# Patient Record
Sex: FEMALE | Race: OTHER | Employment: OTHER | URBAN - METROPOLITAN AREA
[De-identification: names, ages, dates, MRNs, and addresses within clinical notes are randomized per-mention and may not be internally consistent; named-entity substitution may affect disease eponyms.]

---

## 2017-01-12 ENCOUNTER — HOSPITAL ENCOUNTER (EMERGENCY)
Age: 52
Discharge: HOME | End: 2017-01-12
Payer: COMMERCIAL

## 2017-01-12 VITALS
HEIGHT: 63 IN | BODY MASS INDEX: 35.79 KG/M2 | TEMPERATURE: 98.1 F | WEIGHT: 202 LBS | SYSTOLIC BLOOD PRESSURE: 156 MMHG | HEART RATE: 96 BPM | RESPIRATION RATE: 17 BRPM | OXYGEN SATURATION: 99 % | DIASTOLIC BLOOD PRESSURE: 94 MMHG

## 2017-01-12 DIAGNOSIS — N39.0 URINARY TRACT INFECTION WITH HEMATURIA, SITE UNSPECIFIED: Primary | ICD-10-CM

## 2017-01-12 DIAGNOSIS — R31.9 URINARY TRACT INFECTION WITH HEMATURIA, SITE UNSPECIFIED: Primary | ICD-10-CM

## 2017-01-12 LAB
BACTERIA: ABNORMAL /HPF
BILIRUBIN URINE: NEGATIVE
BLOOD, URINE: ABNORMAL
CLARITY: ABNORMAL
COLOR: YELLOW
EPITHELIAL CELLS, UA: ABNORMAL /HPF
GLUCOSE URINE: 500 MG/DL
KETONES, URINE: NEGATIVE MG/DL
LEUKOCYTE ESTERASE, URINE: ABNORMAL
NITRITE, URINE: NEGATIVE
PH UA: 6 (ref 5–9)
PROTEIN UA: NEGATIVE MG/DL
RBC UA: ABNORMAL /HPF (ref 0–2)
SPECIFIC GRAVITY UA: 1.01 (ref 1–1.03)
UROBILINOGEN, URINE: 0.2 E.U./DL
WBC UA: >100 /HPF (ref 0–5)

## 2017-01-12 PROCEDURE — 81001 URINALYSIS AUTO W/SCOPE: CPT

## 2017-01-12 PROCEDURE — 6370000000 HC RX 637 (ALT 250 FOR IP): Performed by: PERSONAL EMERGENCY RESPONSE ATTENDANT

## 2017-01-12 PROCEDURE — 99283 EMERGENCY DEPT VISIT LOW MDM: CPT

## 2017-01-12 RX ORDER — SULFAMETHOXAZOLE AND TRIMETHOPRIM 800; 160 MG/1; MG/1
1 TABLET ORAL 2 TIMES DAILY
Qty: 6 TABLET | Refills: 0 | Status: SHIPPED | OUTPATIENT
Start: 2017-01-12 | End: 2017-01-15

## 2017-01-12 RX ORDER — SULFAMETHOXAZOLE AND TRIMETHOPRIM 800; 160 MG/1; MG/1
1 TABLET ORAL ONCE
Status: COMPLETED | OUTPATIENT
Start: 2017-01-12 | End: 2017-01-12

## 2017-01-12 RX ORDER — ACETAMINOPHEN 500 MG
500 TABLET ORAL EVERY 6 HOURS PRN
Qty: 20 TABLET | Refills: 0 | Status: SHIPPED | OUTPATIENT
Start: 2017-01-12 | End: 2017-04-14

## 2017-01-12 RX ORDER — PHENAZOPYRIDINE HYDROCHLORIDE 200 MG/1
200 TABLET, FILM COATED ORAL 3 TIMES DAILY PRN
Qty: 6 TABLET | Refills: 0 | Status: SHIPPED | OUTPATIENT
Start: 2017-01-12 | End: 2017-01-15

## 2017-01-12 RX ORDER — ACETAMINOPHEN 500 MG
1000 TABLET ORAL ONCE
Status: COMPLETED | OUTPATIENT
Start: 2017-01-12 | End: 2017-01-12

## 2017-01-12 RX ORDER — PHENAZOPYRIDINE HYDROCHLORIDE 200 MG/1
200 TABLET, FILM COATED ORAL ONCE
Status: COMPLETED | OUTPATIENT
Start: 2017-01-12 | End: 2017-01-12

## 2017-01-12 RX ADMIN — SULFAMETHOXAZOLE AND TRIMETHOPRIM 1 TABLET: 800; 160 TABLET ORAL at 20:15

## 2017-01-12 RX ADMIN — PHENAZOPYRIDINE 200 MG: 200 TABLET ORAL at 20:15

## 2017-01-12 RX ADMIN — ACETAMINOPHEN 1000 MG: 500 TABLET ORAL at 20:15

## 2017-01-12 ASSESSMENT — ENCOUNTER SYMPTOMS
COLOR CHANGE: 0
NAUSEA: 0
SORE THROAT: 0
RHINORRHEA: 0
BLOOD IN STOOL: 0
ABDOMINAL PAIN: 1
DIARRHEA: 0
SHORTNESS OF BREATH: 0
VOMITING: 0
COUGH: 0

## 2017-01-12 ASSESSMENT — PAIN DESCRIPTION - FREQUENCY: FREQUENCY: CONTINUOUS

## 2017-01-12 ASSESSMENT — PAIN SCALES - GENERAL
PAINLEVEL_OUTOF10: 10
PAINLEVEL_OUTOF10: 10

## 2017-01-12 ASSESSMENT — PAIN DESCRIPTION - LOCATION: LOCATION: ABDOMEN

## 2017-01-12 ASSESSMENT — PAIN DESCRIPTION - DESCRIPTORS: DESCRIPTORS: PRESSURE

## 2017-01-12 ASSESSMENT — PAIN DESCRIPTION - ORIENTATION: ORIENTATION: LOWER

## 2017-04-14 ENCOUNTER — OFFICE VISIT (OUTPATIENT)
Dept: OBGYN | Age: 52
End: 2017-04-14

## 2017-04-14 VITALS
WEIGHT: 203 LBS | DIASTOLIC BLOOD PRESSURE: 84 MMHG | SYSTOLIC BLOOD PRESSURE: 120 MMHG | BODY MASS INDEX: 35.96 KG/M2 | HEART RATE: 84 BPM

## 2017-04-14 DIAGNOSIS — E89.41 HOT FLASHES DUE TO SURGICAL MENOPAUSE: Primary | ICD-10-CM

## 2017-04-14 DIAGNOSIS — Z76.0 ENCOUNTER FOR MEDICATION REFILL: ICD-10-CM

## 2017-04-14 PROCEDURE — 99213 OFFICE O/P EST LOW 20 MIN: CPT | Performed by: OBSTETRICS & GYNECOLOGY

## 2017-04-14 RX ORDER — ESTRADIOL 0.05 MG/D
1 FILM, EXTENDED RELEASE TRANSDERMAL
Qty: 8 PATCH | Refills: 11 | Status: SHIPPED | OUTPATIENT
Start: 2017-04-17 | End: 2018-03-13 | Stop reason: SDUPTHER

## 2017-04-14 ASSESSMENT — ENCOUNTER SYMPTOMS
SHORTNESS OF BREATH: 0
COUGH: 0
VOMITING: 0
NAUSEA: 0

## 2017-07-29 ENCOUNTER — APPOINTMENT (OUTPATIENT)
Dept: GENERAL RADIOLOGY | Age: 52
End: 2017-07-29
Payer: COMMERCIAL

## 2017-07-29 ENCOUNTER — HOSPITAL ENCOUNTER (OUTPATIENT)
Age: 52
Setting detail: OBSERVATION
Discharge: HOME | End: 2017-07-31
Attending: INTERNAL MEDICINE | Admitting: INTERNAL MEDICINE
Payer: COMMERCIAL

## 2017-07-29 DIAGNOSIS — R07.2 PRECORDIAL PAIN: Primary | ICD-10-CM

## 2017-07-29 LAB
ALBUMIN SERPL-MCNC: 3.9 G/DL (ref 3.9–4.9)
ALP BLD-CCNC: 119 U/L (ref 40–130)
ALT SERPL-CCNC: 30 U/L (ref 0–33)
AMPHETAMINE SCREEN, URINE: NORMAL
ANION GAP SERPL CALCULATED.3IONS-SCNC: 12 MEQ/L (ref 7–13)
AST SERPL-CCNC: 26 U/L (ref 0–35)
BARBITURATE SCREEN URINE: NORMAL
BASOPHILS ABSOLUTE: 0.1 K/UL (ref 0–0.2)
BASOPHILS RELATIVE PERCENT: 1.1 %
BENZODIAZEPINE SCREEN, URINE: NORMAL
BILIRUB SERPL-MCNC: 0.5 MG/DL (ref 0–1.2)
BILIRUBIN URINE: NEGATIVE
BLOOD, URINE: NEGATIVE
BUN BLDV-MCNC: 10 MG/DL (ref 6–20)
CALCIUM SERPL-MCNC: 8.7 MG/DL (ref 8.6–10.2)
CANNABINOID SCREEN URINE: NORMAL
CHLORIDE BLD-SCNC: 98 MEQ/L (ref 98–107)
CLARITY: ABNORMAL
CO2: 28 MEQ/L (ref 22–29)
COCAINE METABOLITE SCREEN URINE: NORMAL
COLOR: YELLOW
CREAT SERPL-MCNC: 0.66 MG/DL (ref 0.5–0.9)
EOSINOPHILS ABSOLUTE: 0.2 K/UL (ref 0–0.7)
EOSINOPHILS RELATIVE PERCENT: 2.1 %
GFR AFRICAN AMERICAN: >60
GFR NON-AFRICAN AMERICAN: >60
GLOBULIN: 3 G/DL (ref 2.3–3.5)
GLUCOSE BLD-MCNC: 178 MG/DL (ref 60–115)
GLUCOSE BLD-MCNC: 251 MG/DL (ref 74–109)
GLUCOSE URINE: 500 MG/DL
HCT VFR BLD CALC: 41.7 % (ref 37–47)
HEMOGLOBIN: 13.7 G/DL (ref 12–16)
KETONES, URINE: ABNORMAL MG/DL
LEUKOCYTE ESTERASE, URINE: NEGATIVE
LYMPHOCYTES ABSOLUTE: 1.8 K/UL (ref 1–4.8)
LYMPHOCYTES RELATIVE PERCENT: 23.5 %
Lab: NORMAL
MAGNESIUM: 1.7 MG/DL (ref 1.7–2.3)
MCH RBC QN AUTO: 27.3 PG (ref 27–31.3)
MCHC RBC AUTO-ENTMCNC: 32.8 % (ref 33–37)
MCV RBC AUTO: 83.5 FL (ref 82–100)
MONOCYTES ABSOLUTE: 0.5 K/UL (ref 0.2–0.8)
MONOCYTES RELATIVE PERCENT: 6.1 %
NEUTROPHILS ABSOLUTE: 5.1 K/UL (ref 1.4–6.5)
NEUTROPHILS RELATIVE PERCENT: 67.2 %
NITRITE, URINE: NEGATIVE
OPIATE SCREEN URINE: NORMAL
PDW BLD-RTO: 15.9 % (ref 11.5–14.5)
PERFORMED ON: ABNORMAL
PH UA: 6 (ref 5–9)
PHENCYCLIDINE SCREEN URINE: NORMAL
PLATELET # BLD: 247 K/UL (ref 130–400)
POTASSIUM SERPL-SCNC: 4.1 MEQ/L (ref 3.5–5.1)
PRO-BNP: 40 PG/ML
PROTEIN UA: NEGATIVE MG/DL
RBC # BLD: 5 M/UL (ref 4.2–5.4)
SODIUM BLD-SCNC: 138 MEQ/L (ref 132–144)
SPECIFIC GRAVITY UA: 1.02 (ref 1–1.03)
TOTAL CK: 59 U/L (ref 0–170)
TOTAL PROTEIN: 6.9 G/DL (ref 6.4–8.1)
TROPONIN: <0.01 NG/ML (ref 0–0.01)
URINE REFLEX TO CULTURE: ABNORMAL
UROBILINOGEN, URINE: 1 E.U./DL
WBC # BLD: 7.6 K/UL (ref 4.8–10.8)

## 2017-07-29 PROCEDURE — 36415 COLL VENOUS BLD VENIPUNCTURE: CPT

## 2017-07-29 PROCEDURE — 96372 THER/PROPH/DIAG INJ SC/IM: CPT

## 2017-07-29 PROCEDURE — 83735 ASSAY OF MAGNESIUM: CPT

## 2017-07-29 PROCEDURE — 99285 EMERGENCY DEPT VISIT HI MDM: CPT

## 2017-07-29 PROCEDURE — 2580000003 HC RX 258: Performed by: PHYSICIAN ASSISTANT

## 2017-07-29 PROCEDURE — 6360000002 HC RX W HCPCS: Performed by: PHYSICIAN ASSISTANT

## 2017-07-29 PROCEDURE — 96375 TX/PRO/DX INJ NEW DRUG ADDON: CPT

## 2017-07-29 PROCEDURE — 6370000000 HC RX 637 (ALT 250 FOR IP): Performed by: INTERNAL MEDICINE

## 2017-07-29 PROCEDURE — G0378 HOSPITAL OBSERVATION PER HR: HCPCS

## 2017-07-29 PROCEDURE — 6370000000 HC RX 637 (ALT 250 FOR IP): Performed by: PHYSICIAN ASSISTANT

## 2017-07-29 PROCEDURE — 82550 ASSAY OF CK (CPK): CPT

## 2017-07-29 PROCEDURE — 81003 URINALYSIS AUTO W/O SCOPE: CPT

## 2017-07-29 PROCEDURE — 85025 COMPLETE CBC W/AUTO DIFF WBC: CPT

## 2017-07-29 PROCEDURE — 84484 ASSAY OF TROPONIN QUANT: CPT

## 2017-07-29 PROCEDURE — 96374 THER/PROPH/DIAG INJ IV PUSH: CPT

## 2017-07-29 PROCEDURE — 71020 XR CHEST STANDARD TWO VW: CPT

## 2017-07-29 PROCEDURE — 83880 ASSAY OF NATRIURETIC PEPTIDE: CPT

## 2017-07-29 PROCEDURE — 80307 DRUG TEST PRSMV CHEM ANLYZR: CPT

## 2017-07-29 PROCEDURE — 80053 COMPREHEN METABOLIC PANEL: CPT

## 2017-07-29 PROCEDURE — 93005 ELECTROCARDIOGRAM TRACING: CPT

## 2017-07-29 RX ORDER — MORPHINE SULFATE 4 MG/ML
4 INJECTION, SOLUTION INTRAMUSCULAR; INTRAVENOUS ONCE
Status: COMPLETED | OUTPATIENT
Start: 2017-07-29 | End: 2017-07-29

## 2017-07-29 RX ORDER — ACETAMINOPHEN 325 MG/1
650 TABLET ORAL EVERY 4 HOURS PRN
Status: DISCONTINUED | OUTPATIENT
Start: 2017-07-29 | End: 2017-07-31 | Stop reason: HOSPADM

## 2017-07-29 RX ORDER — MORPHINE SULFATE 2 MG/ML
2 INJECTION, SOLUTION INTRAMUSCULAR; INTRAVENOUS
Status: DISCONTINUED | OUTPATIENT
Start: 2017-07-29 | End: 2017-07-31 | Stop reason: HOSPADM

## 2017-07-29 RX ORDER — METFORMIN HYDROCHLORIDE 500 MG/1
1000 TABLET, EXTENDED RELEASE ORAL 2 TIMES DAILY WITH MEALS
Status: DISCONTINUED | OUTPATIENT
Start: 2017-07-30 | End: 2017-07-31 | Stop reason: HOSPADM

## 2017-07-29 RX ORDER — CETIRIZINE HYDROCHLORIDE 10 MG/1
10 TABLET ORAL DAILY
Status: DISCONTINUED | OUTPATIENT
Start: 2017-07-30 | End: 2017-07-31 | Stop reason: HOSPADM

## 2017-07-29 RX ORDER — NITROGLYCERIN 0.4 MG/1
0.4 TABLET SUBLINGUAL EVERY 5 MIN PRN
Status: DISCONTINUED | OUTPATIENT
Start: 2017-07-29 | End: 2017-07-31 | Stop reason: HOSPADM

## 2017-07-29 RX ORDER — LEVOTHYROXINE SODIUM 0.15 MG/1
150 TABLET ORAL
Status: DISCONTINUED | OUTPATIENT
Start: 2017-07-30 | End: 2017-07-31 | Stop reason: HOSPADM

## 2017-07-29 RX ORDER — GLIPIZIDE 5 MG/1
5 TABLET ORAL DAILY
Status: DISCONTINUED | OUTPATIENT
Start: 2017-07-30 | End: 2017-07-31 | Stop reason: HOSPADM

## 2017-07-29 RX ORDER — DIVALPROEX SODIUM 500 MG/1
500 TABLET, EXTENDED RELEASE ORAL DAILY
Status: DISCONTINUED | OUTPATIENT
Start: 2017-07-30 | End: 2017-07-31 | Stop reason: HOSPADM

## 2017-07-29 RX ORDER — DULOXETIN HYDROCHLORIDE 30 MG/1
30 CAPSULE, DELAYED RELEASE ORAL DAILY
COMMUNITY
End: 2018-12-18

## 2017-07-29 RX ORDER — MORPHINE SULFATE 4 MG/ML
4 INJECTION, SOLUTION INTRAMUSCULAR; INTRAVENOUS
Status: DISCONTINUED | OUTPATIENT
Start: 2017-07-29 | End: 2017-07-31 | Stop reason: HOSPADM

## 2017-07-29 RX ORDER — 0.9 % SODIUM CHLORIDE 0.9 %
1000 INTRAVENOUS SOLUTION INTRAVENOUS ONCE
Status: COMPLETED | OUTPATIENT
Start: 2017-07-29 | End: 2017-07-29

## 2017-07-29 RX ORDER — PANTOPRAZOLE SODIUM 40 MG/1
40 TABLET, DELAYED RELEASE ORAL
Status: DISCONTINUED | OUTPATIENT
Start: 2017-07-30 | End: 2017-07-31 | Stop reason: HOSPADM

## 2017-07-29 RX ORDER — DULOXETIN HYDROCHLORIDE 30 MG/1
30 CAPSULE, DELAYED RELEASE ORAL DAILY
Status: DISCONTINUED | OUTPATIENT
Start: 2017-07-30 | End: 2017-07-31 | Stop reason: HOSPADM

## 2017-07-29 RX ORDER — ASPIRIN 81 MG/1
81 TABLET, CHEWABLE ORAL DAILY
Status: CANCELLED | OUTPATIENT
Start: 2017-07-30

## 2017-07-29 RX ORDER — SODIUM CHLORIDE 0.9 % (FLUSH) 0.9 %
10 SYRINGE (ML) INJECTION PRN
Status: DISCONTINUED | OUTPATIENT
Start: 2017-07-29 | End: 2017-07-31 | Stop reason: HOSPADM

## 2017-07-29 RX ORDER — ATORVASTATIN CALCIUM 20 MG/1
20 TABLET, FILM COATED ORAL NIGHTLY
Status: DISCONTINUED | OUTPATIENT
Start: 2017-07-29 | End: 2017-07-31 | Stop reason: HOSPADM

## 2017-07-29 RX ORDER — ASPIRIN 325 MG
325 TABLET ORAL DAILY
Status: DISCONTINUED | OUTPATIENT
Start: 2017-07-30 | End: 2017-07-31 | Stop reason: HOSPADM

## 2017-07-29 RX ORDER — SODIUM CHLORIDE 0.9 % (FLUSH) 0.9 %
10 SYRINGE (ML) INJECTION EVERY 12 HOURS SCHEDULED
Status: DISCONTINUED | OUTPATIENT
Start: 2017-07-29 | End: 2017-07-31 | Stop reason: HOSPADM

## 2017-07-29 RX ORDER — ONDANSETRON 2 MG/ML
4 INJECTION INTRAMUSCULAR; INTRAVENOUS ONCE
Status: COMPLETED | OUTPATIENT
Start: 2017-07-29 | End: 2017-07-29

## 2017-07-29 RX ORDER — ONDANSETRON 2 MG/ML
4 INJECTION INTRAMUSCULAR; INTRAVENOUS EVERY 6 HOURS PRN
Status: DISCONTINUED | OUTPATIENT
Start: 2017-07-29 | End: 2017-07-31 | Stop reason: HOSPADM

## 2017-07-29 RX ORDER — QUETIAPINE FUMARATE 50 MG/1
50 TABLET, FILM COATED ORAL NIGHTLY
Status: DISCONTINUED | OUTPATIENT
Start: 2017-07-29 | End: 2017-07-31 | Stop reason: HOSPADM

## 2017-07-29 RX ORDER — METOPROLOL TARTRATE 50 MG/1
50 TABLET, FILM COATED ORAL ONCE
Status: COMPLETED | OUTPATIENT
Start: 2017-07-29 | End: 2017-07-29

## 2017-07-29 RX ORDER — ATORVASTATIN CALCIUM 20 MG/1
20 TABLET, FILM COATED ORAL NIGHTLY
Status: DISCONTINUED | OUTPATIENT
Start: 2017-07-29 | End: 2017-07-29 | Stop reason: SDUPTHER

## 2017-07-29 RX ORDER — ATORVASTATIN CALCIUM 20 MG/1
20 TABLET, FILM COATED ORAL NIGHTLY
COMMUNITY

## 2017-07-29 RX ORDER — LISINOPRIL 10 MG/1
10 TABLET ORAL DAILY
Status: DISCONTINUED | OUTPATIENT
Start: 2017-07-30 | End: 2017-07-31 | Stop reason: HOSPADM

## 2017-07-29 RX ORDER — ESTRADIOL 0.05 MG/D
1 FILM, EXTENDED RELEASE TRANSDERMAL
Status: DISCONTINUED | OUTPATIENT
Start: 2017-07-31 | End: 2017-07-31 | Stop reason: HOSPADM

## 2017-07-29 RX ADMIN — NITROGLYCERIN 0.4 MG: 0.4 TABLET SUBLINGUAL at 18:02

## 2017-07-29 RX ADMIN — ATORVASTATIN CALCIUM 20 MG: 20 TABLET, FILM COATED ORAL at 20:39

## 2017-07-29 RX ADMIN — ACETAMINOPHEN 650 MG: 325 TABLET ORAL at 20:38

## 2017-07-29 RX ADMIN — MORPHINE SULFATE 4 MG: 4 INJECTION, SOLUTION INTRAMUSCULAR; INTRAVENOUS at 16:50

## 2017-07-29 RX ADMIN — ENOXAPARIN SODIUM 90 MG: 100 INJECTION SUBCUTANEOUS at 16:50

## 2017-07-29 RX ADMIN — SODIUM CHLORIDE 1000 ML: 9 INJECTION, SOLUTION INTRAVENOUS at 15:29

## 2017-07-29 RX ADMIN — METOPROLOL TARTRATE 50 MG: 50 TABLET ORAL at 16:50

## 2017-07-29 RX ADMIN — NITROGLYCERIN 0.4 MG: 0.4 TABLET SUBLINGUAL at 17:52

## 2017-07-29 RX ADMIN — ONDANSETRON 4 MG: 2 INJECTION INTRAMUSCULAR; INTRAVENOUS at 16:57

## 2017-07-29 RX ADMIN — NITROGLYCERIN 0.4 MG: 0.4 TABLET SUBLINGUAL at 15:29

## 2017-07-29 ASSESSMENT — ENCOUNTER SYMPTOMS
ABDOMINAL PAIN: 0
APNEA: 0
ALLERGIC/IMMUNOLOGIC NEGATIVE: 1
TROUBLE SWALLOWING: 0
NAUSEA: 1
COLOR CHANGE: 0
EYE PAIN: 0
VOMITING: 0
DIARRHEA: 0
SHORTNESS OF BREATH: 0

## 2017-07-29 ASSESSMENT — PAIN SCALES - WONG BAKER: WONGBAKER_NUMERICALRESPONSE: 0

## 2017-07-29 ASSESSMENT — PAIN SCALES - GENERAL
PAINLEVEL_OUTOF10: 9
PAINLEVEL_OUTOF10: 0
PAINLEVEL_OUTOF10: 2
PAINLEVEL_OUTOF10: 0
PAINLEVEL_OUTOF10: 6
PAINLEVEL_OUTOF10: 9
PAINLEVEL_OUTOF10: 9

## 2017-07-29 ASSESSMENT — HEART SCORE: ECG: 0

## 2017-07-29 ASSESSMENT — PAIN DESCRIPTION - PAIN TYPE
TYPE: ACUTE PAIN
TYPE: ACUTE PAIN

## 2017-07-29 ASSESSMENT — PAIN DESCRIPTION - LOCATION: LOCATION: CHEST

## 2017-07-29 ASSESSMENT — PAIN DESCRIPTION - ORIENTATION: ORIENTATION: MID

## 2017-07-30 PROBLEM — R07.2 PRECORDIAL PAIN: Status: ACTIVE | Noted: 2017-07-30

## 2017-07-30 PROBLEM — R07.9 CHEST PAIN: Status: ACTIVE | Noted: 2017-07-30

## 2017-07-30 LAB
ANION GAP SERPL CALCULATED.3IONS-SCNC: 11 MEQ/L (ref 7–13)
BUN BLDV-MCNC: 10 MG/DL (ref 6–20)
CALCIUM SERPL-MCNC: 8.4 MG/DL (ref 8.6–10.2)
CHLORIDE BLD-SCNC: 104 MEQ/L (ref 98–107)
CHOLESTEROL, TOTAL: 143 MG/DL (ref 0–199)
CO2: 27 MEQ/L (ref 22–29)
CREAT SERPL-MCNC: 0.64 MG/DL (ref 0.5–0.9)
GFR AFRICAN AMERICAN: >60
GFR NON-AFRICAN AMERICAN: >60
GLUCOSE BLD-MCNC: 105 MG/DL (ref 60–115)
GLUCOSE BLD-MCNC: 120 MG/DL (ref 60–115)
GLUCOSE BLD-MCNC: 135 MG/DL (ref 74–109)
GLUCOSE BLD-MCNC: 73 MG/DL (ref 60–115)
GLUCOSE BLD-MCNC: 97 MG/DL (ref 60–115)
HCT VFR BLD CALC: 37.1 % (ref 37–47)
HDLC SERPL-MCNC: 29 MG/DL (ref 40–59)
HEMOGLOBIN: 12 G/DL (ref 12–16)
INR BLD: 0.9
LDL CHOLESTEROL CALCULATED: 84 MG/DL (ref 0–129)
MAGNESIUM: 1.8 MG/DL (ref 1.7–2.3)
MCH RBC QN AUTO: 26.8 PG (ref 27–31.3)
MCHC RBC AUTO-ENTMCNC: 32.4 % (ref 33–37)
MCV RBC AUTO: 82.8 FL (ref 82–100)
PDW BLD-RTO: 16.1 % (ref 11.5–14.5)
PERFORMED ON: ABNORMAL
PERFORMED ON: NORMAL
PLATELET # BLD: 228 K/UL (ref 130–400)
POTASSIUM SERPL-SCNC: 4.6 MEQ/L (ref 3.5–5.1)
PROTHROMBIN TIME: 9.9 SEC (ref 8.1–13.7)
RBC # BLD: 4.48 M/UL (ref 4.2–5.4)
SODIUM BLD-SCNC: 142 MEQ/L (ref 132–144)
TRIGL SERPL-MCNC: 150 MG/DL (ref 0–200)
WBC # BLD: 7.5 K/UL (ref 4.8–10.8)

## 2017-07-30 PROCEDURE — 80048 BASIC METABOLIC PNL TOTAL CA: CPT

## 2017-07-30 PROCEDURE — 80061 LIPID PANEL: CPT

## 2017-07-30 PROCEDURE — 96372 THER/PROPH/DIAG INJ SC/IM: CPT

## 2017-07-30 PROCEDURE — G0378 HOSPITAL OBSERVATION PER HR: HCPCS

## 2017-07-30 PROCEDURE — 6360000002 HC RX W HCPCS: Performed by: INTERNAL MEDICINE

## 2017-07-30 PROCEDURE — 36415 COLL VENOUS BLD VENIPUNCTURE: CPT

## 2017-07-30 PROCEDURE — 99222 1ST HOSP IP/OBS MODERATE 55: CPT | Performed by: INTERNAL MEDICINE

## 2017-07-30 PROCEDURE — 6370000000 HC RX 637 (ALT 250 FOR IP): Performed by: INTERNAL MEDICINE

## 2017-07-30 PROCEDURE — 2580000003 HC RX 258: Performed by: INTERNAL MEDICINE

## 2017-07-30 PROCEDURE — 85610 PROTHROMBIN TIME: CPT

## 2017-07-30 PROCEDURE — 85027 COMPLETE CBC AUTOMATED: CPT

## 2017-07-30 PROCEDURE — 83735 ASSAY OF MAGNESIUM: CPT

## 2017-07-30 PROCEDURE — 93005 ELECTROCARDIOGRAM TRACING: CPT

## 2017-07-30 RX ADMIN — ACETAMINOPHEN 650 MG: 325 TABLET ORAL at 16:32

## 2017-07-30 RX ADMIN — SODIUM CHLORIDE, PRESERVATIVE FREE 10 ML: 5 INJECTION INTRAVENOUS at 20:34

## 2017-07-30 RX ADMIN — DIVALPROEX SODIUM 500 MG: 500 TABLET, EXTENDED RELEASE ORAL at 08:32

## 2017-07-30 RX ADMIN — METFORMIN HYDROCHLORIDE 1000 MG: 500 TABLET, EXTENDED RELEASE ORAL at 08:32

## 2017-07-30 RX ADMIN — ENOXAPARIN SODIUM 40 MG: 40 INJECTION SUBCUTANEOUS at 08:32

## 2017-07-30 RX ADMIN — ATORVASTATIN CALCIUM 20 MG: 20 TABLET, FILM COATED ORAL at 20:34

## 2017-07-30 RX ADMIN — ASPIRIN 325 MG: 325 TABLET, COATED ORAL at 08:32

## 2017-07-30 RX ADMIN — PANTOPRAZOLE SODIUM 40 MG: 40 TABLET, DELAYED RELEASE ORAL at 08:31

## 2017-07-30 RX ADMIN — DULOXETINE HYDROCHLORIDE 30 MG: 30 CAPSULE, DELAYED RELEASE ORAL at 08:32

## 2017-07-30 RX ADMIN — LEVOTHYROXINE SODIUM 150 MCG: 150 TABLET ORAL at 08:31

## 2017-07-30 RX ADMIN — GLIPIZIDE 5 MG: 5 TABLET ORAL at 08:31

## 2017-07-30 RX ADMIN — METFORMIN HYDROCHLORIDE 1000 MG: 500 TABLET, EXTENDED RELEASE ORAL at 16:32

## 2017-07-30 RX ADMIN — SODIUM CHLORIDE, PRESERVATIVE FREE 10 ML: 5 INJECTION INTRAVENOUS at 08:32

## 2017-07-30 RX ADMIN — LISINOPRIL 10 MG: 10 TABLET ORAL at 08:32

## 2017-07-30 RX ADMIN — CETIRIZINE HYDROCHLORIDE 10 MG: 10 TABLET, FILM COATED ORAL at 08:31

## 2017-07-30 RX ADMIN — QUETIAPINE FUMARATE 50 MG: 50 TABLET, FILM COATED ORAL at 20:34

## 2017-07-30 ASSESSMENT — ENCOUNTER SYMPTOMS
COUGH: 0
NAUSEA: 0
WHEEZING: 0
EYES NEGATIVE: 1
SHORTNESS OF BREATH: 1
CHEST TIGHTNESS: 1
GASTROINTESTINAL NEGATIVE: 1
BLOOD IN STOOL: 0
STRIDOR: 0

## 2017-07-30 ASSESSMENT — PAIN SCALES - GENERAL
PAINLEVEL_OUTOF10: 0
PAINLEVEL_OUTOF10: 9
PAINLEVEL_OUTOF10: 0

## 2017-07-30 ASSESSMENT — PAIN DESCRIPTION - PAIN TYPE: TYPE: ACUTE PAIN

## 2017-07-30 ASSESSMENT — PAIN DESCRIPTION - LOCATION: LOCATION: HEAD

## 2017-07-31 ENCOUNTER — APPOINTMENT (OUTPATIENT)
Dept: NUCLEAR MEDICINE | Age: 52
End: 2017-07-31
Payer: COMMERCIAL

## 2017-07-31 VITALS
DIASTOLIC BLOOD PRESSURE: 52 MMHG | WEIGHT: 203.26 LBS | OXYGEN SATURATION: 98 % | HEIGHT: 63 IN | RESPIRATION RATE: 18 BRPM | SYSTOLIC BLOOD PRESSURE: 114 MMHG | BODY MASS INDEX: 36.02 KG/M2 | TEMPERATURE: 98.1 F | HEART RATE: 62 BPM

## 2017-07-31 LAB
GLUCOSE BLD-MCNC: 102 MG/DL (ref 60–115)
GLUCOSE BLD-MCNC: 274 MG/DL (ref 60–115)
GLUCOSE BLD-MCNC: 97 MG/DL (ref 60–115)
PERFORMED ON: ABNORMAL
PERFORMED ON: NORMAL
PERFORMED ON: NORMAL

## 2017-07-31 PROCEDURE — A9502 TC99M TETROFOSMIN: HCPCS | Performed by: INTERNAL MEDICINE

## 2017-07-31 PROCEDURE — 93005 ELECTROCARDIOGRAM TRACING: CPT

## 2017-07-31 PROCEDURE — 2580000003 HC RX 258: Performed by: INTERNAL MEDICINE

## 2017-07-31 PROCEDURE — 6370000000 HC RX 637 (ALT 250 FOR IP): Performed by: INTERNAL MEDICINE

## 2017-07-31 PROCEDURE — 93017 CV STRESS TEST TRACING ONLY: CPT

## 2017-07-31 PROCEDURE — 6360000002 HC RX W HCPCS: Performed by: INTERNAL MEDICINE

## 2017-07-31 PROCEDURE — 78452 HT MUSCLE IMAGE SPECT MULT: CPT

## 2017-07-31 PROCEDURE — G0378 HOSPITAL OBSERVATION PER HR: HCPCS

## 2017-07-31 PROCEDURE — 3430000000 HC RX DIAGNOSTIC RADIOPHARMACEUTICAL: Performed by: INTERNAL MEDICINE

## 2017-07-31 PROCEDURE — 96372 THER/PROPH/DIAG INJ SC/IM: CPT

## 2017-07-31 PROCEDURE — 2700000000 HC OXYGEN THERAPY PER DAY

## 2017-07-31 RX ORDER — SODIUM CHLORIDE 0.9 % (FLUSH) 0.9 %
10 SYRINGE (ML) INJECTION PRN
Status: DISCONTINUED | OUTPATIENT
Start: 2017-07-31 | End: 2017-07-31 | Stop reason: HOSPADM

## 2017-07-31 RX ADMIN — METFORMIN HYDROCHLORIDE 1000 MG: 500 TABLET, EXTENDED RELEASE ORAL at 17:53

## 2017-07-31 RX ADMIN — Medication 10 ML: at 12:55

## 2017-07-31 RX ADMIN — CETIRIZINE HYDROCHLORIDE 10 MG: 10 TABLET, FILM COATED ORAL at 08:44

## 2017-07-31 RX ADMIN — PANTOPRAZOLE SODIUM 40 MG: 40 TABLET, DELAYED RELEASE ORAL at 06:02

## 2017-07-31 RX ADMIN — Medication 20 ML: at 14:19

## 2017-07-31 RX ADMIN — SODIUM CHLORIDE, PRESERVATIVE FREE 10 ML: 5 INJECTION INTRAVENOUS at 08:45

## 2017-07-31 RX ADMIN — REGADENOSON 0.4 MG: 0.08 INJECTION, SOLUTION INTRAVENOUS at 14:03

## 2017-07-31 RX ADMIN — TETROFOSMIN 10.2 MILLICURIE: 0.23 INJECTION, POWDER, LYOPHILIZED, FOR SOLUTION INTRAVENOUS at 12:55

## 2017-07-31 RX ADMIN — ACETAMINOPHEN 650 MG: 325 TABLET ORAL at 15:38

## 2017-07-31 RX ADMIN — LEVOTHYROXINE SODIUM 150 MCG: 150 TABLET ORAL at 06:02

## 2017-07-31 RX ADMIN — LISINOPRIL 10 MG: 10 TABLET ORAL at 08:44

## 2017-07-31 RX ADMIN — ASPIRIN 325 MG: 325 TABLET, COATED ORAL at 08:44

## 2017-07-31 RX ADMIN — TETROFOSMIN 29.4 MILLICURIE: 0.23 INJECTION, POWDER, LYOPHILIZED, FOR SOLUTION INTRAVENOUS at 14:03

## 2017-07-31 RX ADMIN — ENOXAPARIN SODIUM 40 MG: 40 INJECTION SUBCUTANEOUS at 08:45

## 2017-07-31 RX ADMIN — DIVALPROEX SODIUM 500 MG: 500 TABLET, EXTENDED RELEASE ORAL at 08:45

## 2017-07-31 ASSESSMENT — ENCOUNTER SYMPTOMS
CHEST TIGHTNESS: 0
APNEA: 0
EYES NEGATIVE: 1
GASTROINTESTINAL NEGATIVE: 1
COUGH: 0
WHEEZING: 0
ALLERGIC/IMMUNOLOGIC NEGATIVE: 1
STRIDOR: 0

## 2017-07-31 ASSESSMENT — PAIN SCALES - GENERAL
PAINLEVEL_OUTOF10: 0
PAINLEVEL_OUTOF10: 8

## 2017-08-01 LAB
EKG ATRIAL RATE: 64 BPM
EKG ATRIAL RATE: 65 BPM
EKG ATRIAL RATE: 72 BPM
EKG ATRIAL RATE: 89 BPM
EKG P AXIS: 37 DEGREES
EKG P AXIS: 53 DEGREES
EKG P AXIS: 57 DEGREES
EKG P AXIS: 67 DEGREES
EKG P-R INTERVAL: 120 MS
EKG P-R INTERVAL: 154 MS
EKG P-R INTERVAL: 156 MS
EKG P-R INTERVAL: 158 MS
EKG Q-T INTERVAL: 366 MS
EKG Q-T INTERVAL: 398 MS
EKG Q-T INTERVAL: 428 MS
EKG Q-T INTERVAL: 438 MS
EKG QRS DURATION: 78 MS
EKG QRS DURATION: 84 MS
EKG QRS DURATION: 84 MS
EKG QRS DURATION: 86 MS
EKG QTC CALCULATION (BAZETT): 435 MS
EKG QTC CALCULATION (BAZETT): 441 MS
EKG QTC CALCULATION (BAZETT): 445 MS
EKG QTC CALCULATION (BAZETT): 455 MS
EKG R AXIS: 16 DEGREES
EKG R AXIS: 24 DEGREES
EKG R AXIS: 37 DEGREES
EKG R AXIS: 37 DEGREES
EKG T AXIS: 38 DEGREES
EKG T AXIS: 45 DEGREES
EKG T AXIS: 49 DEGREES
EKG T AXIS: 59 DEGREES
EKG VENTRICULAR RATE: 64 BPM
EKG VENTRICULAR RATE: 65 BPM
EKG VENTRICULAR RATE: 72 BPM
EKG VENTRICULAR RATE: 89 BPM

## 2018-03-13 RX ORDER — ESTRADIOL 0.05 MG/D
FILM, EXTENDED RELEASE TRANSDERMAL
Qty: 8 PATCH | Refills: 11 | Status: SHIPPED | OUTPATIENT
Start: 2018-03-13 | End: 2019-02-05 | Stop reason: SDUPTHER

## 2018-03-24 ENCOUNTER — APPOINTMENT (OUTPATIENT)
Dept: GENERAL RADIOLOGY | Age: 53
End: 2018-03-24
Payer: MEDICAID

## 2018-03-24 ENCOUNTER — HOSPITAL ENCOUNTER (EMERGENCY)
Age: 53
Discharge: HOME | End: 2018-03-24
Attending: EMERGENCY MEDICINE
Payer: MEDICAID

## 2018-03-24 VITALS
HEIGHT: 62 IN | HEART RATE: 73 BPM | OXYGEN SATURATION: 97 % | TEMPERATURE: 98 F | SYSTOLIC BLOOD PRESSURE: 109 MMHG | WEIGHT: 210 LBS | DIASTOLIC BLOOD PRESSURE: 74 MMHG | RESPIRATION RATE: 18 BRPM | BODY MASS INDEX: 38.64 KG/M2

## 2018-03-24 DIAGNOSIS — R10.13 ABDOMINAL PAIN, EPIGASTRIC: Primary | ICD-10-CM

## 2018-03-24 LAB
ALBUMIN SERPL-MCNC: 3.9 G/DL (ref 3.9–4.9)
ALP BLD-CCNC: 115 U/L (ref 40–130)
ALT SERPL-CCNC: 37 U/L (ref 0–33)
ANION GAP SERPL CALCULATED.3IONS-SCNC: 15 MEQ/L (ref 7–13)
AST SERPL-CCNC: 32 U/L (ref 0–35)
BASOPHILS ABSOLUTE: 0.1 K/UL (ref 0–0.2)
BASOPHILS RELATIVE PERCENT: 1.4 %
BILIRUB SERPL-MCNC: 0.3 MG/DL (ref 0–1.2)
BILIRUBIN URINE: NEGATIVE
BLOOD, URINE: NEGATIVE
BUN BLDV-MCNC: 9 MG/DL (ref 6–20)
CALCIUM SERPL-MCNC: 8.5 MG/DL (ref 8.6–10.2)
CHLORIDE BLD-SCNC: 93 MEQ/L (ref 98–107)
CLARITY: CLEAR
CO2: 26 MEQ/L (ref 22–29)
COLOR: YELLOW
CREAT SERPL-MCNC: 0.62 MG/DL (ref 0.5–0.9)
EKG ATRIAL RATE: 77 BPM
EKG P AXIS: 62 DEGREES
EKG P-R INTERVAL: 160 MS
EKG Q-T INTERVAL: 390 MS
EKG QRS DURATION: 80 MS
EKG QTC CALCULATION (BAZETT): 441 MS
EKG R AXIS: 33 DEGREES
EKG T AXIS: 56 DEGREES
EKG VENTRICULAR RATE: 77 BPM
EOSINOPHILS ABSOLUTE: 0.2 K/UL (ref 0–0.7)
EOSINOPHILS RELATIVE PERCENT: 2.3 %
GFR AFRICAN AMERICAN: >60
GFR NON-AFRICAN AMERICAN: >60
GLOBULIN: 2.8 G/DL (ref 2.3–3.5)
GLUCOSE BLD-MCNC: 290 MG/DL (ref 74–109)
GLUCOSE URINE: >=1000 MG/DL
HCT VFR BLD CALC: 40.4 % (ref 37–47)
HEMOGLOBIN: 13.7 G/DL (ref 12–16)
KETONES, URINE: NEGATIVE MG/DL
LEUKOCYTE ESTERASE, URINE: NEGATIVE
LIPASE: 31 U/L (ref 13–60)
LYMPHOCYTES ABSOLUTE: 2.1 K/UL (ref 1–4.8)
LYMPHOCYTES RELATIVE PERCENT: 32.6 %
MCH RBC QN AUTO: 29.1 PG (ref 27–31.3)
MCHC RBC AUTO-ENTMCNC: 33.9 % (ref 33–37)
MCV RBC AUTO: 85.7 FL (ref 82–100)
MONOCYTES ABSOLUTE: 0.5 K/UL (ref 0.2–0.8)
MONOCYTES RELATIVE PERCENT: 7.3 %
NEUTROPHILS ABSOLUTE: 3.7 K/UL (ref 1.4–6.5)
NEUTROPHILS RELATIVE PERCENT: 56.4 %
NITRITE, URINE: NEGATIVE
PDW BLD-RTO: 14.2 % (ref 11.5–14.5)
PH UA: 6.5 (ref 5–9)
PLATELET # BLD: 219 K/UL (ref 130–400)
POTASSIUM SERPL-SCNC: 3.8 MEQ/L (ref 3.5–5.1)
PROTEIN UA: NEGATIVE MG/DL
RBC # BLD: 4.71 M/UL (ref 4.2–5.4)
SODIUM BLD-SCNC: 134 MEQ/L (ref 132–144)
SPECIFIC GRAVITY UA: 1.01 (ref 1–1.03)
TOTAL PROTEIN: 6.7 G/DL (ref 6.4–8.1)
TROPONIN: <0.01 NG/ML (ref 0–0.01)
URINE REFLEX TO CULTURE: ABNORMAL
UROBILINOGEN, URINE: 0.2 E.U./DL
WBC # BLD: 6.5 K/UL (ref 4.8–10.8)

## 2018-03-24 PROCEDURE — 83690 ASSAY OF LIPASE: CPT

## 2018-03-24 PROCEDURE — 71045 X-RAY EXAM CHEST 1 VIEW: CPT

## 2018-03-24 PROCEDURE — 84484 ASSAY OF TROPONIN QUANT: CPT

## 2018-03-24 PROCEDURE — 96372 THER/PROPH/DIAG INJ SC/IM: CPT

## 2018-03-24 PROCEDURE — 99285 EMERGENCY DEPT VISIT HI MDM: CPT

## 2018-03-24 PROCEDURE — 6370000000 HC RX 637 (ALT 250 FOR IP): Performed by: EMERGENCY MEDICINE

## 2018-03-24 PROCEDURE — 93005 ELECTROCARDIOGRAM TRACING: CPT

## 2018-03-24 PROCEDURE — 80053 COMPREHEN METABOLIC PANEL: CPT

## 2018-03-24 PROCEDURE — 6360000002 HC RX W HCPCS: Performed by: EMERGENCY MEDICINE

## 2018-03-24 PROCEDURE — 36415 COLL VENOUS BLD VENIPUNCTURE: CPT

## 2018-03-24 PROCEDURE — 81003 URINALYSIS AUTO W/O SCOPE: CPT

## 2018-03-24 PROCEDURE — 85025 COMPLETE CBC W/AUTO DIFF WBC: CPT

## 2018-03-24 RX ORDER — CLONIDINE HYDROCHLORIDE 0.1 MG/1
0.2 TABLET ORAL ONCE
Status: COMPLETED | OUTPATIENT
Start: 2018-03-24 | End: 2018-03-24

## 2018-03-24 RX ORDER — DICYCLOMINE HCL 20 MG
20 TABLET ORAL EVERY 6 HOURS
Qty: 20 TABLET | Refills: 0 | Status: SHIPPED | OUTPATIENT
Start: 2018-03-24 | End: 2020-07-30

## 2018-03-24 RX ORDER — DICYCLOMINE HYDROCHLORIDE 10 MG/ML
20 INJECTION INTRAMUSCULAR ONCE
Status: COMPLETED | OUTPATIENT
Start: 2018-03-24 | End: 2018-03-24

## 2018-03-24 RX ADMIN — Medication 30 ML: at 16:50

## 2018-03-24 RX ADMIN — DICYCLOMINE HYDROCHLORIDE 20 MG: 20 INJECTION, SOLUTION INTRAMUSCULAR at 18:28

## 2018-03-24 RX ADMIN — CLONIDINE HYDROCHLORIDE 0.2 MG: 0.1 TABLET ORAL at 16:50

## 2018-03-24 ASSESSMENT — ENCOUNTER SYMPTOMS
ANAL BLEEDING: 0
ABDOMINAL PAIN: 1
EYE PAIN: 0
VOMITING: 0
EYE DISCHARGE: 0
PHOTOPHOBIA: 0
CHEST TIGHTNESS: 1
WHEEZING: 0
NAUSEA: 0
SINUS PRESSURE: 0
CONSTIPATION: 0
FACIAL SWELLING: 0
EYE ITCHING: 0
COLOR CHANGE: 0
ABDOMINAL DISTENTION: 0
DIARRHEA: 0
BLOOD IN STOOL: 0
BACK PAIN: 0
SORE THROAT: 0
EYE REDNESS: 0
STRIDOR: 0
RHINORRHEA: 0
COUGH: 0
SHORTNESS OF BREATH: 0
TROUBLE SWALLOWING: 0
VOICE CHANGE: 0
CHOKING: 0

## 2018-03-24 ASSESSMENT — PAIN DESCRIPTION - LOCATION: LOCATION: CHEST

## 2018-03-24 ASSESSMENT — PAIN SCALES - GENERAL: PAINLEVEL_OUTOF10: 9

## 2018-03-24 ASSESSMENT — PAIN DESCRIPTION - PAIN TYPE: TYPE: ACUTE PAIN

## 2018-03-24 NOTE — ED PROVIDER NOTES
epigastric          DISPOSITION/PLAN   DISPOSITION Decision To Discharge 03/24/2018 06:58:22 PM      PATIENT REFERRED TO:  Jeffrey Aguilar MD  [address] [id number] [phone]    Go in 3 days  For follow up. Return if worse in any way.       DISCHARGE MEDICATIONS:  New Prescriptions    DICYCLOMINE (BENTYL) 20 MG TABLET    Take 1 tablet by mouth every 6 hours          (Please note that portions of this note were completed with a voice recognition program.  Efforts were made to edit the dictations but occasionally words are mis-transcribed.)    Gretchen Albert MD (electronically signed)  Attending Emergency Physician            Gretchen Albert MD  03/24/18 0560

## 2018-03-26 PROCEDURE — 93010 ELECTROCARDIOGRAM REPORT: CPT | Performed by: INTERNAL MEDICINE

## 2018-08-02 ENCOUNTER — HOSPITAL ENCOUNTER (EMERGENCY)
Age: 53
Discharge: HOME | End: 2018-08-02
Attending: EMERGENCY MEDICINE
Payer: MEDICAID

## 2018-08-02 VITALS
HEIGHT: 63 IN | RESPIRATION RATE: 16 BRPM | DIASTOLIC BLOOD PRESSURE: 72 MMHG | TEMPERATURE: 99.1 F | WEIGHT: 202 LBS | SYSTOLIC BLOOD PRESSURE: 116 MMHG | HEART RATE: 72 BPM | BODY MASS INDEX: 35.79 KG/M2 | OXYGEN SATURATION: 97 %

## 2018-08-02 DIAGNOSIS — E86.0 DEHYDRATION: ICD-10-CM

## 2018-08-02 DIAGNOSIS — R73.9 HYPERGLYCEMIA, UNSPECIFIED: Primary | ICD-10-CM

## 2018-08-02 LAB
ANION GAP SERPL CALCULATED.3IONS-SCNC: 12 MEQ/L (ref 7–13)
BETA-HYDROXYBUTYRATE: 0.8 MG/DL (ref 0.2–2.8)
BUN BLDV-MCNC: 12 MG/DL (ref 6–20)
CALCIUM SERPL-MCNC: 8.3 MG/DL (ref 8.6–10.2)
CHLORIDE BLD-SCNC: 98 MEQ/L (ref 98–107)
CHP ED QC CHECK: YES
CHP ED QC CHECK: YES
CO2: 27 MEQ/L (ref 22–29)
CREAT SERPL-MCNC: 0.91 MG/DL (ref 0.5–0.9)
GFR AFRICAN AMERICAN: >60
GFR NON-AFRICAN AMERICAN: >60
GLUCOSE BLD-MCNC: 172 MG/DL
GLUCOSE BLD-MCNC: 172 MG/DL (ref 60–115)
GLUCOSE BLD-MCNC: 184 MG/DL (ref 60–115)
GLUCOSE BLD-MCNC: 330 MG/DL
GLUCOSE BLD-MCNC: 330 MG/DL (ref 60–115)
GLUCOSE BLD-MCNC: 334 MG/DL (ref 60–115)
GLUCOSE BLD-MCNC: 355 MG/DL (ref 74–109)
HCT VFR BLD CALC: 39.4 % (ref 37–47)
HEMOGLOBIN: 13.3 G/DL (ref 12–16)
MCH RBC QN AUTO: 29.4 PG (ref 27–31.3)
MCHC RBC AUTO-ENTMCNC: 33.7 % (ref 33–37)
MCV RBC AUTO: 87.4 FL (ref 82–100)
PDW BLD-RTO: 14.7 % (ref 11.5–14.5)
PERFORMED ON: ABNORMAL
PLATELET # BLD: 239 K/UL (ref 130–400)
POTASSIUM SERPL-SCNC: 4.3 MEQ/L (ref 3.5–5.1)
RBC # BLD: 4.52 M/UL (ref 4.2–5.4)
SODIUM BLD-SCNC: 137 MEQ/L (ref 132–144)
WBC # BLD: 8.4 K/UL (ref 4.8–10.8)

## 2018-08-02 PROCEDURE — 36415 COLL VENOUS BLD VENIPUNCTURE: CPT

## 2018-08-02 PROCEDURE — 96361 HYDRATE IV INFUSION ADD-ON: CPT

## 2018-08-02 PROCEDURE — 80048 BASIC METABOLIC PNL TOTAL CA: CPT

## 2018-08-02 PROCEDURE — 99284 EMERGENCY DEPT VISIT MOD MDM: CPT

## 2018-08-02 PROCEDURE — 96374 THER/PROPH/DIAG INJ IV PUSH: CPT

## 2018-08-02 PROCEDURE — 2580000003 HC RX 258: Performed by: EMERGENCY MEDICINE

## 2018-08-02 PROCEDURE — 82010 KETONE BODYS QUAN: CPT

## 2018-08-02 PROCEDURE — 85027 COMPLETE CBC AUTOMATED: CPT

## 2018-08-02 PROCEDURE — 6370000000 HC RX 637 (ALT 250 FOR IP): Performed by: EMERGENCY MEDICINE

## 2018-08-02 RX ORDER — 0.9 % SODIUM CHLORIDE 0.9 %
1000 INTRAVENOUS SOLUTION INTRAVENOUS ONCE
Status: COMPLETED | OUTPATIENT
Start: 2018-08-02 | End: 2018-08-02

## 2018-08-02 RX ADMIN — INSULIN HUMAN 8 UNITS: 100 INJECTION, SOLUTION PARENTERAL at 21:10

## 2018-08-02 RX ADMIN — SODIUM CHLORIDE 1000 ML: 9 INJECTION, SOLUTION INTRAVENOUS at 21:07

## 2018-08-02 ASSESSMENT — ENCOUNTER SYMPTOMS
COUGH: 0
VOMITING: 0
SHORTNESS OF BREATH: 0

## 2018-08-02 ASSESSMENT — PAIN DESCRIPTION - DESCRIPTORS: DESCRIPTORS: ACHING

## 2018-08-02 ASSESSMENT — PAIN DESCRIPTION - PAIN TYPE: TYPE: ACUTE PAIN

## 2018-08-02 ASSESSMENT — PAIN DESCRIPTION - LOCATION: LOCATION: HEAD

## 2018-08-02 ASSESSMENT — PAIN SCALES - GENERAL: PAINLEVEL_OUTOF10: 2

## 2018-08-03 NOTE — ED PROVIDER NOTES
appears well-developed and well-nourished. No distress. HENT:   Head: Normocephalic and atraumatic. Mouth/Throat: Oropharynx is clear and moist.   Eyes: Conjunctivae are normal.   Pupils are equally round and reacting normally. Extraoccular muscles are grossly intact. Neck: Normal range of motion. No tracheal deviation present. Cardiovascular: Normal rate, regular rhythm, normal heart sounds and intact distal pulses. Exam reveals no friction rub. No murmur heard. Pulmonary/Chest: Effort normal and breath sounds normal. No stridor. No respiratory distress. She has no wheezes. She has no rales. She exhibits no tenderness. Abdominal: Soft. She exhibits no distension and no mass. There is no tenderness. There is no rebound and no guarding. Musculoskeletal: Normal range of motion. She exhibits no edema or deformity. Neurological: She is alert. Answering questions appropriately. No gaze deficit. No gait abnormality. Moving all extremities. Skin: Skin is warm and dry. Psychiatric: She has a normal mood and affect. Judgment normal.       EMERGENCY DEPARTMENT COURSE and DIFFERENTIAL DIAGNOSIS/MDM:   Vitals:    Vitals:    08/02/18 2038   BP: (!) 144/78   Pulse: 80   Resp: 18   Temp: 99.1 °F (37.3 °C)   TempSrc: Oral   SpO2: 96%   Weight: 202 lb (91.6 kg)   Height: 5' 3\" (1.6 m)       Patient presents to the emergency department with the complaint described above. Vitals are grossly normal, she is nontoxic in appearance. Physical exam reveals no significant abnormalities and she does have elevated glucose here. We will do routine blood work to make sure there is no significant elevation in DKA or underlying hematologic abnormalities. We'll give some IV fluids and some regular insulin and I will then reevaluate.       DIAGNOSTIC RESULTS     LABS:  Labs Reviewed   CBC - Abnormal; Notable for the following:        Result Value    RDW 14.7 (*)     All other components within normal limits   BASIC METABOLIC PANEL - Abnormal; Notable for the following:     Glucose 355 (*)     CREATININE 0.91 (*)     Calcium 8.3 (*)     All other components within normal limits   POCT GLUCOSE - Abnormal; Notable for the following:     POC Glucose 330 (*)     All other components within normal limits   POCT GLUCOSE - Abnormal; Notable for the following:     POC Glucose 334 (*)     All other components within normal limits   POCT GLUCOSE - Abnormal; Notable for the following:     POC Glucose 184 (*)     All other components within normal limits   POCT GLUCOSE - Normal   BETA-HYDROXYBUTYRATE   POCT GLUCOSE       All other labs were within normal range or not returned as of this dictation. No orders to display         ED Course as of Aug 02 2216   Thu Aug 02, 2018   2215 Laboratory results are normal, vitals are normal, repeat blood sugar is less than 200. Patient given instructions for hyperglycemia, told to hydrate, she is to follow up with her PCP for possible medication adjustment and reevaluation. Standard anticipatory guidance given to patient upon discharge. Have given them a specific time frame in which to follow-up and who to follow-up with. I have also advised them that they should return to the emergency department if they get worse, or not getting better or develop any new or concerning symptoms. Patient demonstrates understanding.    [TS]      ED Course User Index  [TS] Leonidas Carlson DO       CONSULTS:  None    PROCEDURES:  Unless otherwise noted below, none     Procedures    FINAL IMPRESSION      1. Hyperglycemia, unspecified    2.  Dehydration          DISPOSITION/PLAN   DISPOSITION Decision To Discharge 08/02/2018 10:15:49 PM      PATIENT REFERRED TO:  Ranjit Dueñas MD  [address] [address]  [address] [place] [address] [phone]    In 3 days        DISCHARGE MEDICATIONS:  New Prescriptions    No medications on file          (Please note that portions of this note were completed with a voice recognition program.  Efforts were made to edit the dictations but occasionally words are mis-transcribed.)    Travis Pena DO (electronically signed)  Attending Emergency Physician          Travis Pena DO  08/02/18 6818

## 2018-08-03 NOTE — ED TRIAGE NOTES
Pt reports to ED from home with C/O hyperglycemia. Pt reports it was 466 at home. OT checked in triage at 330. She reports it was in 300's yesterday. Pt also reports nausea, denies emesis, also with HA. PT A/O x 4, skin WNL. Denies recent illness.  PT reports taking Metformin and insulin after checking blood sugar at home

## 2018-09-20 ENCOUNTER — HOSPITAL ENCOUNTER (EMERGENCY)
Age: 53
Discharge: HOME | End: 2018-09-20
Payer: MEDICAID

## 2018-09-20 ENCOUNTER — APPOINTMENT (OUTPATIENT)
Dept: GENERAL RADIOLOGY | Age: 53
End: 2018-09-20
Payer: MEDICAID

## 2018-09-20 VITALS
RESPIRATION RATE: 17 BRPM | TEMPERATURE: 98.8 F | HEART RATE: 56 BPM | BODY MASS INDEX: 36.5 KG/M2 | DIASTOLIC BLOOD PRESSURE: 88 MMHG | SYSTOLIC BLOOD PRESSURE: 147 MMHG | WEIGHT: 206 LBS | HEIGHT: 63 IN | OXYGEN SATURATION: 98 %

## 2018-09-20 DIAGNOSIS — S46.911A STRAIN OF RIGHT SHOULDER, INITIAL ENCOUNTER: Primary | ICD-10-CM

## 2018-09-20 PROCEDURE — 6370000000 HC RX 637 (ALT 250 FOR IP): Performed by: PHYSICIAN ASSISTANT

## 2018-09-20 PROCEDURE — 99283 EMERGENCY DEPT VISIT LOW MDM: CPT

## 2018-09-20 PROCEDURE — 73030 X-RAY EXAM OF SHOULDER: CPT

## 2018-09-20 RX ORDER — HYDROCODONE BITARTRATE AND ACETAMINOPHEN 5; 325 MG/1; MG/1
1 TABLET ORAL ONCE
Status: COMPLETED | OUTPATIENT
Start: 2018-09-20 | End: 2018-09-20

## 2018-09-20 RX ORDER — HYDROCODONE BITARTRATE AND ACETAMINOPHEN 5; 325 MG/1; MG/1
1 TABLET ORAL EVERY 6 HOURS PRN
Qty: 10 TABLET | Refills: 0 | Status: SHIPPED | OUTPATIENT
Start: 2018-09-20 | End: 2018-09-23

## 2018-09-20 RX ADMIN — HYDROCODONE BITARTRATE AND ACETAMINOPHEN 1 TABLET: 5; 325 TABLET ORAL at 22:19

## 2018-09-20 ASSESSMENT — PAIN DESCRIPTION - ORIENTATION: ORIENTATION: RIGHT

## 2018-09-20 ASSESSMENT — ENCOUNTER SYMPTOMS
COUGH: 0
ABDOMINAL DISTENTION: 0
SHORTNESS OF BREATH: 0
NAUSEA: 0
EYE DISCHARGE: 0
ANAL BLEEDING: 0
EYE PAIN: 0
VOMITING: 0
VOICE CHANGE: 0
APNEA: 0
PHOTOPHOBIA: 0

## 2018-09-20 ASSESSMENT — PAIN SCALES - GENERAL
PAINLEVEL_OUTOF10: 8
PAINLEVEL_OUTOF10: 8

## 2018-09-20 ASSESSMENT — PAIN DESCRIPTION - DESCRIPTORS: DESCRIPTORS: ACHING

## 2018-09-20 ASSESSMENT — PAIN DESCRIPTION - LOCATION: LOCATION: SHOULDER

## 2018-09-20 ASSESSMENT — PAIN DESCRIPTION - PAIN TYPE: TYPE: ACUTE PAIN

## 2018-09-21 NOTE — ED PROVIDER NOTES
[facility] ED  eMERGENCY dEPARTMENT eNCOUnter      Pt Name: Lulú Gil  MRN: [id number]  [place] 1965  Date of evaluation: 9/20/2018  Provider: Eufemia Hoyos PA-C    CHIEF COMPLAINT       Chief Complaint   Patient presents with    Shoulder Pain     pt c/o r shoulder pain x1 day         HISTORY OF PRESENT ILLNESS   (Location/Symptom, Timing/Onset, Context/Setting, Quality, Duration, Modifying Factors, Severity)  Note limiting factors. Lulú Gil is a 47 y.o. female who presents to the emergency department  Right shoulder pain ×1 day it began when patient lifted a bottle of the furniture to pop and painful to move or lift ever since she denies any additional injuries denies neck pain chest pain shortness breath nausea vomiting she notes the pain goes from her shoulder towards her upper back. Symptoms are moderate severity worse with motion. HPI    Nursing Notes were reviewed. REVIEW OF SYSTEMS    (2-9 systems for level 4, 10 or more for level 5)     Review of Systems   Constitutional: Negative for activity change, appetite change, fever and unexpected weight change. HENT: Negative for ear discharge, nosebleeds and voice change. Eyes: Negative for photophobia, pain and discharge. Respiratory: Negative for apnea, cough and shortness of breath. Cardiovascular: Negative for chest pain. Gastrointestinal: Negative for abdominal distention, anal bleeding, nausea and vomiting. Endocrine: Negative for cold intolerance, heat intolerance and polyphagia. Genitourinary: Negative for difficulty urinating and genital sores. Musculoskeletal: Positive for arthralgias. Negative for joint swelling, neck pain and neck stiffness. Skin: Negative for pallor. Allergic/Immunologic: Negative for immunocompromised state. Neurological: Negative for seizures, facial asymmetry and headaches. Hematological: Does not bruise/bleed easily.    Psychiatric/Behavioral: Negative for History reviewed. No pertinent family history. SOCIAL HISTORY       Social History     Social History    Marital status: Single     Spouse name: N/A    Number of children: N/A    Years of education: N/A     Social History Main Topics    Smoking status: Never Smoker    Smokeless tobacco: Never Used    Alcohol use No    Drug use: No    Sexual activity: Not Asked     Other Topics Concern    None     Social History Narrative    None       SCREENINGS             PHYSICAL EXAM    (up to 7 for level 4, 8 or more for level 5)     ED Triage Vitals [09/20/18 2053]   BP Temp Temp Source Pulse Resp SpO2 Height Weight   (!) 148/86 98.8 °F (37.1 °C) Oral 95 17 98 % 5' 3\" (1.6 m) 206 lb (93.4 kg)       Physical Exam   Constitutional: She is oriented to person, place, and time. She appears well-developed and well-nourished. No distress. HENT:   Head: Normocephalic and atraumatic. Eyes: Pupils are equal, round, and reactive to light. Conjunctivae and EOM are normal. Right eye exhibits no discharge. Left eye exhibits no discharge. Neck: Normal range of motion. Neck supple. Cardiovascular: Normal rate, regular rhythm, normal heart sounds and intact distal pulses. Pulmonary/Chest: Effort normal and breath sounds normal. No stridor. No respiratory distress. She has no wheezes. She has no rales. Abdominal: Soft. Bowel sounds are normal. She exhibits no distension. There is no tenderness. Musculoskeletal: She exhibits tenderness. She exhibits no deformity. Arms:  Neurological: She is alert and oriented to person, place, and time. Skin: Skin is warm. No erythema. Psychiatric: She has a normal mood and affect. Nursing note and vitals reviewed.       DIAGNOSTIC RESULTS     EKG: All EKG's are interpreted by the Emergency Department Physician who either signs or Co-signs this chart in the absence of a cardiologist.         RADIOLOGY:   Non-plain film images such as CT, Ultrasound and MRI are read by the radiologist. Plain radiographic images are visualized and preliminarily interpreted by the emergency physician with the below findings:    See rad report. Interpretation per the Radiologist below, if available at the time of this note:    XR SHOULDER RIGHT (MIN 2 VIEWS)    (Results Pending)         ED BEDSIDE ULTRASOUND:   Performed by ED Physician - none    LABS:  Labs Reviewed - No data to display    All other labs were within normal range or not returned as of this dictation. EMERGENCY DEPARTMENT COURSE and DIFFERENTIAL DIAGNOSIS/MDM:   Vitals:    Vitals:    09/20/18 2053   BP: (!) 148/86   Pulse: 95   Resp: 17   Temp: 98.8 °F (37.1 °C)   TempSrc: Oral   SpO2: 98%   Weight: 206 lb (93.4 kg)   Height: 5' 3\" (1.6 m)            MDM  Number of Diagnoses or Management Options  Diagnosis management comments: MA bedside translates for patient were necessary. We discussed follow-up we discussed exam x-ray findings. We discussed medications patient takes ibuprofen at home and we'll provide pain medication. Amount and/or Complexity of Data Reviewed  Tests in the radiology section of CPT®: reviewed and ordered  Obtain history from someone other than the patient: yes  Discuss the patient with other providers: yes        CRITICAL CARE TIME     CONSULTS:  None    PROCEDURES:  Unless otherwise noted below, none     Procedures    FINAL IMPRESSION      1.  Strain of right shoulder, initial encounter          DISPOSITION/PLAN   DISPOSITION Decision To Discharge 09/20/2018 10:04:51 PM      PATIENT REFERRED TO:  Mukund Bah MD  [address] [address]  [address] [place] [id number] [phone]    Schedule an appointment as soon as possible for a visit in 2 days  As needed    [facility]) ED  [address] [facility]  [address] [facility] [address]  [phone]    If symptoms worsen    Rich Ortiz MD  [facility] [address] [facility]  [phone]    Schedule an appointment as soon as possible for a visit

## 2018-10-25 ENCOUNTER — HOSPITAL ENCOUNTER (EMERGENCY)
Age: 53
Discharge: HOME | End: 2018-10-25
Attending: EMERGENCY MEDICINE
Payer: MEDICAID

## 2018-10-25 VITALS
OXYGEN SATURATION: 99 % | SYSTOLIC BLOOD PRESSURE: 130 MMHG | DIASTOLIC BLOOD PRESSURE: 73 MMHG | HEART RATE: 73 BPM | RESPIRATION RATE: 20 BRPM | HEIGHT: 62 IN | WEIGHT: 205 LBS | BODY MASS INDEX: 37.73 KG/M2 | TEMPERATURE: 97.7 F

## 2018-10-25 DIAGNOSIS — M62.838 SPASM OF MUSCLE: Primary | ICD-10-CM

## 2018-10-25 PROCEDURE — 99283 EMERGENCY DEPT VISIT LOW MDM: CPT

## 2018-10-25 PROCEDURE — 6370000000 HC RX 637 (ALT 250 FOR IP): Performed by: EMERGENCY MEDICINE

## 2018-10-25 RX ORDER — PREDNISONE 20 MG/1
60 TABLET ORAL ONCE
Status: COMPLETED | OUTPATIENT
Start: 2018-10-25 | End: 2018-10-25

## 2018-10-25 RX ORDER — HYDROCODONE BITARTRATE AND ACETAMINOPHEN 5; 325 MG/1; MG/1
2 TABLET ORAL EVERY 6 HOURS PRN
Qty: 12 TABLET | Refills: 0 | Status: SHIPPED | OUTPATIENT
Start: 2018-10-25 | End: 2018-11-01

## 2018-10-25 RX ORDER — OXYCODONE HYDROCHLORIDE AND ACETAMINOPHEN 5; 325 MG/1; MG/1
2 TABLET ORAL ONCE
Status: COMPLETED | OUTPATIENT
Start: 2018-10-25 | End: 2018-10-25

## 2018-10-25 RX ADMIN — OXYCODONE HYDROCHLORIDE AND ACETAMINOPHEN 2 TABLET: 5; 325 TABLET ORAL at 15:09

## 2018-10-25 RX ADMIN — PREDNISONE 60 MG: 20 TABLET ORAL at 15:09

## 2018-10-25 ASSESSMENT — ENCOUNTER SYMPTOMS: BACK PAIN: 1

## 2018-10-25 ASSESSMENT — PAIN DESCRIPTION - ONSET: ONSET: ON-GOING

## 2018-10-25 ASSESSMENT — PAIN SCALES - WONG BAKER: WONGBAKER_NUMERICALRESPONSE: 6

## 2018-10-25 ASSESSMENT — PAIN DESCRIPTION - PAIN TYPE
TYPE: CHRONIC PAIN
TYPE: CHRONIC PAIN

## 2018-10-25 ASSESSMENT — PAIN DESCRIPTION - ORIENTATION
ORIENTATION: LOWER
ORIENTATION: LOWER;MID;RIGHT;LEFT

## 2018-10-25 ASSESSMENT — PAIN - FUNCTIONAL ASSESSMENT: PAIN_FUNCTIONAL_ASSESSMENT: 0-10

## 2018-10-25 ASSESSMENT — PAIN DESCRIPTION - FREQUENCY: FREQUENCY: CONTINUOUS

## 2018-10-25 ASSESSMENT — PAIN DESCRIPTION - PROGRESSION: CLINICAL_PROGRESSION: NOT CHANGED

## 2018-10-25 ASSESSMENT — PAIN SCALES - GENERAL
PAINLEVEL_OUTOF10: 10
PAINLEVEL_OUTOF10: 5

## 2018-10-25 ASSESSMENT — PAIN DESCRIPTION - DESCRIPTORS
DESCRIPTORS: CRAMPING;DISCOMFORT;SORE
DESCRIPTORS: ACHING

## 2018-10-25 ASSESSMENT — PAIN DESCRIPTION - LOCATION
LOCATION: BACK
LOCATION: BACK

## 2018-10-25 NOTE — ED NOTES
Pt and son provided with discharge instructions & prescription x1, also educated verbally. Pt and son both verbalize understanding & deny any questions.      Nicholas Shannon RN  10/25/18 8827

## 2018-10-25 NOTE — ED PROVIDER NOTES
[address] [facility] ED  eMERGENCY dEPARTMENT eNCOUnter      Pt Name: Dayna Heimlich  MRN: [id number]  [place] 1965  Date of evaluation: 10/25/2018  Provider: Kait [address]       Chief Complaint   Patient presents with    Back Pain         HISTORY OF PRESENT ILLNESS   (Location/Symptom, Timing/Onset,Context/Setting, Quality, Duration, Modifying Factors, Severity)  Note limiting factors. Dayna Heimlich is a 47 y.o. female who presents to the emergency department For evaluation of back pain. It is in her bilateral upper back and started about 2 hours ago when she went from a sitting to standing position. No chest pain and no shortness of breath. No numbness or weakness in the arms or legs. This is happening her in the past and feels like muscle spasm. She did not take anything for the pain. Nursing Notes were reviewed. REVIEW OF SYSTEMS    (2-9systems for level 4, 10 or more for level 5)     Review of Systems   Constitutional: Negative for fever. Musculoskeletal: Positive for back pain. Skin: Negative for rash. Except asnoted above the remainder of the review of systems was reviewed and negative.        PAST MEDICAL HISTORY     Past Medical History:   Diagnosis Date    Arthritis     Bipolar disorder ([address]     DM (diabetes mellitus) ([facility] [address] [facility]     Dyslipidemia     Fibromyalgia     GERD (gastroesophageal reflux disease)     HTN (hypertension)     Hypothyroidism          SURGICAL HISTORY       Past Surgical History:   Procedure Laterality Date    CHOLECYSTECTOMY      HYSTERECTOMY  07/28/2016    DR Leilani Root THYROID SURGERY           CURRENT MEDICATIONS     Previous Medications    ASPIRIN 81 MG TABLET    Take 1 tablet by mouth daily    ATORVASTATIN (LIPITOR) 20 MG TABLET    Take 20 mg by mouth nightly    DICYCLOMINE (BENTYL) 20 MG TABLET    Take 1 tablet by mouth every 6 hours    DIVALPROEX (DEPAKOTE ER) 500 MG EXTENDED RELEASE TABLET    Take 1 tablet by mouth daily    DULOXETINE (CYMBALTA) 30 MG EXTENDED RELEASE CAPSULE    Take 30 mg by mouth daily    ESTRADIOL (VIVELLE) 0.05 MG/24HR    APPLY 1 PATCH EXTERNALLY TO THE SKIN 2 TIMES A WEEK    FLUOXETINE (PROZAC) 20 MG CAPSULE    Take 1 capsule by mouth daily. GLIPIZIDE (GLUCOTROL) 5 MG TABLET    Take 1 tablet by mouth Daily. LEVOTHYROXINE (SYNTHROID) 150 MCG TABLET    Take 150 mcg by mouth every morning (before breakfast)    LISINOPRIL (PRINIVIL;ZESTRIL) 10 MG TABLET    Take 1 tablet by mouth daily DO NOT TAKE IF SYSTOLIC BP (TOP NUMBER) IS LESS THAN 100    LORATADINE (CLARITIN) 10 MG TABLET    Take 1 tablet by mouth daily. METFORMIN, MOD, (GLUMETZA) 500 MG EXTENDED RELEASE TABLET    Take 1,000 mg by mouth 2 times daily (with meals)    OMEPRAZOLE (PRILOSEC) 20 MG DELAYED RELEASE CAPSULE    Take 20 mg by mouth every morning (before breakfast)    QUETIAPINE (SEROQUEL) 100 MG TABLET    1/2 TO 1 TAB Q HS       ALLERGIES     Asa [aspirin]    FAMILY HISTORY     History reviewed. No pertinent family history. SOCIAL HISTORY       Social History     Social History    Marital status: Single     Spouse name: N/A    Number of children: N/A    Years of education: N/A     Social History Main Topics    Smoking status: Never Smoker    Smokeless tobacco: Never Used    Alcohol use No    Drug use: No    Sexual activity: Not Asked     Other Topics Concern    None     Social History Narrative    None       SCREENINGS             PHYSICAL EXAM    (up to 7 for level 4, 8 or more for level 5)     ED Triage Vitals [10/25/18 1450]   BP Temp Temp Source Pulse Resp SpO2 Height Weight   (!) 167/75 97.7 °F (36.5 °C) Oral 90 14 99 % 5' 2\" (1.575 m) 205 lb (93 kg)       Physical Exam   Constitutional: She appears well-developed and well-nourished. No distress. HENT:   Head: Normocephalic and atraumatic.    Mouth/Throat: Oropharynx is clear and moist.   Eyes: Conjunctivae are normal.   Pupils are equally round and reacting normally. Extraoccular muscles are grossly intact. Neck: Normal range of motion. No tracheal deviation present. Cardiovascular: Normal rate, regular rhythm, normal heart sounds and intact distal pulses. Exam reveals no friction rub. No murmur heard. Pulmonary/Chest: Effort normal and breath sounds normal. No stridor. No respiratory distress. She has no wheezes. She has no rales. She exhibits no tenderness. Abdominal: Soft. She exhibits no distension and no mass. There is no tenderness. There is no rebound and no guarding. Musculoskeletal: Normal range of motion. She exhibits tenderness. She exhibits no edema or deformity. Mild tenderness and hypertonicity in the parathoracic musculature, both sides of the back. No midline tenderness step-off or deformity   Neurological: She is alert. Answering questions appropriately. No gaze deficit. No gait abnormality. Moving all extremities. Skin: Skin is warm and dry. Psychiatric: She has a normal mood and affect. Judgment normal.       EMERGENCY DEPARTMENT COURSE and DIFFERENTIAL DIAGNOSIS/MDM:   Vitals:    Vitals:    10/25/18 1450   BP: (!) 167/75   Pulse: 90   Resp: 14   Temp: 97.7 °F (36.5 °C)   TempSrc: Oral   SpO2: 99%   Weight: 205 lb (93 kg)   Height: 5' 2\" (1.575 m)       Patient presents to the emergency department with the complaint described above. Vital signs show hypertension but are otherwise unremarkable. Physical exam reveals some hypertonicity in the parathoracic musculature and otherwise no obvious abnormalities. I am going to treat her for possible muscle spasm, she has an ASA allergy and cannot get NSAIDs I will give some Percocet and then reevaluate. No orders to display     On reevaluation she didn't improve, she will be discharged with follow-up and return to ER instructions. Standard anticipatory guidance given to patient upon discharge.   Have given them a specific time frame in which to follow-up and who to follow-up

## 2018-12-18 ENCOUNTER — HOSPITAL ENCOUNTER (EMERGENCY)
Age: 53
Discharge: HOME | End: 2018-12-18
Payer: MEDICAID

## 2018-12-18 VITALS
HEIGHT: 62 IN | HEART RATE: 79 BPM | OXYGEN SATURATION: 99 % | WEIGHT: 200 LBS | TEMPERATURE: 98.4 F | RESPIRATION RATE: 16 BRPM | SYSTOLIC BLOOD PRESSURE: 135 MMHG | BODY MASS INDEX: 36.8 KG/M2 | DIASTOLIC BLOOD PRESSURE: 77 MMHG

## 2018-12-18 DIAGNOSIS — R07.9 CHEST PAIN, UNSPECIFIED TYPE: Primary | ICD-10-CM

## 2018-12-18 LAB
ALBUMIN SERPL-MCNC: 3.9 G/DL (ref 3.9–4.9)
ALP BLD-CCNC: 96 U/L (ref 40–130)
ALT SERPL-CCNC: 36 U/L (ref 0–33)
ANION GAP SERPL CALCULATED.3IONS-SCNC: 11 MEQ/L (ref 7–13)
AST SERPL-CCNC: 38 U/L (ref 0–35)
BASOPHILS ABSOLUTE: 0 K/UL (ref 0–0.2)
BASOPHILS RELATIVE PERCENT: 0.4 %
BILIRUB SERPL-MCNC: 0.3 MG/DL (ref 0–1.2)
BUN BLDV-MCNC: 10 MG/DL (ref 6–20)
CALCIUM SERPL-MCNC: 8.8 MG/DL (ref 8.6–10.2)
CHLORIDE BLD-SCNC: 101 MEQ/L (ref 98–107)
CO2: 28 MEQ/L (ref 22–29)
CREAT SERPL-MCNC: 0.65 MG/DL (ref 0.5–0.9)
EKG ATRIAL RATE: 77 BPM
EKG P AXIS: 57 DEGREES
EKG P-R INTERVAL: 148 MS
EKG Q-T INTERVAL: 384 MS
EKG QRS DURATION: 84 MS
EKG QTC CALCULATION (BAZETT): 434 MS
EKG R AXIS: 32 DEGREES
EKG T AXIS: 39 DEGREES
EKG VENTRICULAR RATE: 77 BPM
EOSINOPHILS ABSOLUTE: 0.2 K/UL (ref 0–0.7)
EOSINOPHILS RELATIVE PERCENT: 1.9 %
GFR AFRICAN AMERICAN: >60
GFR NON-AFRICAN AMERICAN: >60
GLOBULIN: 3 G/DL (ref 2.3–3.5)
GLUCOSE BLD-MCNC: 105 MG/DL (ref 74–109)
GLUCOSE BLD-MCNC: 123 MG/DL (ref 60–115)
HCT VFR BLD CALC: 39.1 % (ref 37–47)
HEMOGLOBIN: 13.3 G/DL (ref 12–16)
LYMPHOCYTES ABSOLUTE: 2.9 K/UL (ref 1–4.8)
LYMPHOCYTES RELATIVE PERCENT: 36.2 %
MCH RBC QN AUTO: 29.3 PG (ref 27–31.3)
MCHC RBC AUTO-ENTMCNC: 34 % (ref 33–37)
MCV RBC AUTO: 86.1 FL (ref 82–100)
MONOCYTES ABSOLUTE: 0.7 K/UL (ref 0.2–0.8)
MONOCYTES RELATIVE PERCENT: 8 %
NEUTROPHILS ABSOLUTE: 4.3 K/UL (ref 1.4–6.5)
NEUTROPHILS RELATIVE PERCENT: 53.5 %
PDW BLD-RTO: 14.3 % (ref 11.5–14.5)
PERFORMED ON: ABNORMAL
PLATELET # BLD: 267 K/UL (ref 130–400)
POTASSIUM SERPL-SCNC: 3.7 MEQ/L (ref 3.5–5.1)
RBC # BLD: 4.55 M/UL (ref 4.2–5.4)
SODIUM BLD-SCNC: 140 MEQ/L (ref 132–144)
TOTAL PROTEIN: 6.9 G/DL (ref 6.4–8.1)
TROPONIN: <0.01 NG/ML (ref 0–0.01)
WBC # BLD: 8.1 K/UL (ref 4.8–10.8)

## 2018-12-18 PROCEDURE — 80053 COMPREHEN METABOLIC PANEL: CPT

## 2018-12-18 PROCEDURE — 93005 ELECTROCARDIOGRAM TRACING: CPT

## 2018-12-18 PROCEDURE — 99284 EMERGENCY DEPT VISIT MOD MDM: CPT

## 2018-12-18 PROCEDURE — 84484 ASSAY OF TROPONIN QUANT: CPT

## 2018-12-18 PROCEDURE — 6370000000 HC RX 637 (ALT 250 FOR IP): Performed by: NURSE PRACTITIONER

## 2018-12-18 PROCEDURE — 36415 COLL VENOUS BLD VENIPUNCTURE: CPT

## 2018-12-18 PROCEDURE — 85025 COMPLETE CBC W/AUTO DIFF WBC: CPT

## 2018-12-18 RX ORDER — INSULIN GLARGINE 100 [IU]/ML
15 INJECTION, SOLUTION SUBCUTANEOUS NIGHTLY
Status: ON HOLD | COMMUNITY
End: 2020-09-29

## 2018-12-18 RX ORDER — ACETAMINOPHEN 500 MG
1000 TABLET ORAL EVERY 6 HOURS PRN
Qty: 30 TABLET | Refills: 0 | Status: SHIPPED | OUTPATIENT
Start: 2018-12-18 | End: 2019-02-18 | Stop reason: ALTCHOICE

## 2018-12-18 RX ORDER — ASPIRIN 81 MG/1
162 TABLET, CHEWABLE ORAL ONCE
Status: DISCONTINUED | OUTPATIENT
Start: 2018-12-18 | End: 2018-12-18

## 2018-12-18 RX ORDER — ACETAMINOPHEN 500 MG
1000 TABLET ORAL ONCE
Status: COMPLETED | OUTPATIENT
Start: 2018-12-18 | End: 2018-12-18

## 2018-12-18 RX ORDER — NITROGLYCERIN 0.4 MG/1
0.4 TABLET SUBLINGUAL EVERY 5 MIN PRN
Status: DISCONTINUED | OUTPATIENT
Start: 2018-12-18 | End: 2018-12-18 | Stop reason: HOSPADM

## 2018-12-18 RX ADMIN — ACETAMINOPHEN 1000 MG: 500 TABLET ORAL at 17:45

## 2018-12-18 RX ADMIN — NITROGLYCERIN 0.4 MG: 0.4 TABLET, ORALLY DISINTEGRATING SUBLINGUAL at 17:08

## 2018-12-18 RX ADMIN — NITROGLYCERIN 0.4 MG: 0.4 TABLET, ORALLY DISINTEGRATING SUBLINGUAL at 17:14

## 2018-12-18 RX ADMIN — NITROGLYCERIN 0.4 MG: 0.4 TABLET, ORALLY DISINTEGRATING SUBLINGUAL at 17:42

## 2018-12-18 ASSESSMENT — ENCOUNTER SYMPTOMS
COUGH: 0
CONSTIPATION: 0
SHORTNESS OF BREATH: 0
VOICE CHANGE: 0
COLOR CHANGE: 0
NAUSEA: 0
TROUBLE SWALLOWING: 0
SORE THROAT: 0
DIARRHEA: 0
ABDOMINAL PAIN: 0
VOMITING: 0
BACK PAIN: 0

## 2018-12-18 ASSESSMENT — PAIN DESCRIPTION - FREQUENCY: FREQUENCY: CONTINUOUS

## 2018-12-18 ASSESSMENT — PAIN DESCRIPTION - PAIN TYPE: TYPE: ACUTE PAIN

## 2018-12-18 ASSESSMENT — PAIN DESCRIPTION - PROGRESSION: CLINICAL_PROGRESSION: NOT CHANGED

## 2018-12-18 ASSESSMENT — PAIN DESCRIPTION - DESCRIPTORS: DESCRIPTORS: TIGHTNESS

## 2018-12-18 ASSESSMENT — PAIN DESCRIPTION - LOCATION: LOCATION: CHEST

## 2018-12-18 ASSESSMENT — PAIN SCALES - GENERAL
PAINLEVEL_OUTOF10: 4
PAINLEVEL_OUTOF10: 0
PAINLEVEL_OUTOF10: 9

## 2018-12-18 ASSESSMENT — PAIN DESCRIPTION - ONSET: ONSET: ON-GOING

## 2018-12-18 ASSESSMENT — HEART SCORE: ECG: 0

## 2018-12-18 ASSESSMENT — PAIN DESCRIPTION - ORIENTATION: ORIENTATION: MID

## 2018-12-18 NOTE — ED PROVIDER NOTES
noted complaint. She is non-toxic and vital signs are normal. Cardiac workup obtained including chest xray and EKG which are all unremarkable. She was provided with tylenol and nitro noting moderate relief of her pain. Heart score is 3. Patient had a normal cardiac cath in August of 2017. I do not believe that the patient needs to be admitted at this time and that it is safe to discharge her home and have her follow up with cardiology tomorrow morning. I have provided her with anticipatory guidance and have instructed her on follow up and when to return to the ER in what time frame. The patient verbalized understanding and has no further questions at this time. CRITICAL CARE TIME     Total Critical Care time(not applicable if blank)      Total minutes, excluding separately reportable procedures. There was a high probability of clinically significant/life threatening deterioration in the patient's condition which required my urgent intervention. This includesdiscussing the case with consultants, reviewing laboratory studies and images independently, arranging disposition, and speaking with patient/family    CONSULTS:  None    PROCEDURES:  Unless otherwise notedbelow, none     Procedures    FINAL IMPRESSION     1.  Chest pain, unspecified type          DISPOSITION/PLAN   DISPOSITION Decision To Discharge 12/18/2018 06:53:45 PM      PAT[facility]:  [facility] [address] [address] [phone]    Schedule an appointment as soon as possible for a visit in 1 day      Guru Gauthier MD  [address] [address]  [address] [address] [facility]  [phone]    Schedule an appointment as soon as possible for a visit in 1 day        DISCHARGE MEDICATIONS:  Discharge Medication List as of 12/18/2018  6:55 PM      START taking these medications    Details   acetaminophen (APAP EXTRA STRENGTH) 500 MG tablet Take 2 tablets by mouth every 6 hours as needed for Pain, Disp-30 tablet, R-0Print

## 2018-12-21 PROCEDURE — 93010 ELECTROCARDIOGRAM REPORT: CPT | Performed by: INTERNAL MEDICINE

## 2019-02-05 RX ORDER — ESTRADIOL 0.05 MG/D
FILM, EXTENDED RELEASE TRANSDERMAL
Qty: 8 PATCH | Refills: 11 | Status: SHIPPED | OUTPATIENT
Start: 2019-02-05 | End: 2020-03-10

## 2019-02-18 ENCOUNTER — APPOINTMENT (OUTPATIENT)
Dept: GENERAL RADIOLOGY | Age: 54
End: 2019-02-18
Payer: MEDICAID

## 2019-02-18 ENCOUNTER — HOSPITAL ENCOUNTER (EMERGENCY)
Age: 54
Discharge: HOME | End: 2019-02-18
Payer: MEDICAID

## 2019-02-18 VITALS
HEART RATE: 84 BPM | WEIGHT: 205 LBS | RESPIRATION RATE: 12 BRPM | OXYGEN SATURATION: 98 % | TEMPERATURE: 98.7 F | HEIGHT: 62 IN | BODY MASS INDEX: 37.73 KG/M2 | DIASTOLIC BLOOD PRESSURE: 79 MMHG | SYSTOLIC BLOOD PRESSURE: 137 MMHG

## 2019-02-18 DIAGNOSIS — M79.10 MYALGIA: Primary | ICD-10-CM

## 2019-02-18 LAB
ALBUMIN SERPL-MCNC: 3.7 G/DL (ref 3.5–4.6)
ALP BLD-CCNC: 97 U/L (ref 40–130)
ALT SERPL-CCNC: 17 U/L (ref 0–33)
ANION GAP SERPL CALCULATED.3IONS-SCNC: 11 MEQ/L (ref 9–15)
AST SERPL-CCNC: 18 U/L (ref 0–35)
BASOPHILS ABSOLUTE: 0 K/UL (ref 0–0.2)
BASOPHILS RELATIVE PERCENT: 0.4 %
BILIRUB SERPL-MCNC: <0.2 MG/DL (ref 0.2–0.7)
BUN BLDV-MCNC: 8 MG/DL (ref 6–20)
CALCIUM SERPL-MCNC: 8.6 MG/DL (ref 8.5–9.9)
CHLORIDE BLD-SCNC: 98 MEQ/L (ref 95–107)
CO2: 29 MEQ/L (ref 20–31)
CREAT SERPL-MCNC: 0.62 MG/DL (ref 0.5–0.9)
EKG ATRIAL RATE: 88 BPM
EKG P AXIS: 77 DEGREES
EKG P-R INTERVAL: 140 MS
EKG Q-T INTERVAL: 352 MS
EKG QRS DURATION: 74 MS
EKG QTC CALCULATION (BAZETT): 425 MS
EKG R AXIS: 39 DEGREES
EKG T AXIS: 56 DEGREES
EKG VENTRICULAR RATE: 88 BPM
EOSINOPHILS ABSOLUTE: 0.2 K/UL (ref 0–0.7)
EOSINOPHILS RELATIVE PERCENT: 1.9 %
GFR AFRICAN AMERICAN: >60
GFR NON-AFRICAN AMERICAN: >60
GLOBULIN: 3.3 G/DL (ref 2.3–3.5)
GLUCOSE BLD-MCNC: 162 MG/DL (ref 70–99)
HCT VFR BLD CALC: 39.6 % (ref 37–47)
HEMOGLOBIN: 13.1 G/DL (ref 12–16)
LYMPHOCYTES ABSOLUTE: 2.4 K/UL (ref 1–4.8)
LYMPHOCYTES RELATIVE PERCENT: 29 %
MCH RBC QN AUTO: 28.2 PG (ref 27–31.3)
MCHC RBC AUTO-ENTMCNC: 33.2 % (ref 33–37)
MCV RBC AUTO: 85.2 FL (ref 82–100)
MONOCYTES ABSOLUTE: 0.7 K/UL (ref 0.2–0.8)
MONOCYTES RELATIVE PERCENT: 9 %
NEUTROPHILS ABSOLUTE: 5 K/UL (ref 1.4–6.5)
NEUTROPHILS RELATIVE PERCENT: 59.7 %
PDW BLD-RTO: 14.5 % (ref 11.5–14.5)
PLATELET # BLD: 282 K/UL (ref 130–400)
POTASSIUM SERPL-SCNC: 3.6 MEQ/L (ref 3.4–4.9)
RAPID INFLUENZA  B AGN: NEGATIVE
RAPID INFLUENZA A AGN: NEGATIVE
RBC # BLD: 4.65 M/UL (ref 4.2–5.4)
SODIUM BLD-SCNC: 138 MEQ/L (ref 135–144)
TOTAL PROTEIN: 7 G/DL (ref 6.3–8)
TROPONIN: <0.01 NG/ML (ref 0–0.01)
WBC # BLD: 8.3 K/UL (ref 4.8–10.8)

## 2019-02-18 PROCEDURE — 99284 EMERGENCY DEPT VISIT MOD MDM: CPT

## 2019-02-18 PROCEDURE — 93005 ELECTROCARDIOGRAM TRACING: CPT

## 2019-02-18 PROCEDURE — 36415 COLL VENOUS BLD VENIPUNCTURE: CPT

## 2019-02-18 PROCEDURE — 87804 INFLUENZA ASSAY W/OPTIC: CPT

## 2019-02-18 PROCEDURE — 71046 X-RAY EXAM CHEST 2 VIEWS: CPT

## 2019-02-18 PROCEDURE — 84484 ASSAY OF TROPONIN QUANT: CPT

## 2019-02-18 PROCEDURE — 6370000000 HC RX 637 (ALT 250 FOR IP): Performed by: NURSE PRACTITIONER

## 2019-02-18 PROCEDURE — 85025 COMPLETE CBC W/AUTO DIFF WBC: CPT

## 2019-02-18 PROCEDURE — 80053 COMPREHEN METABOLIC PANEL: CPT

## 2019-02-18 RX ORDER — ACETAMINOPHEN 500 MG
1000 TABLET ORAL EVERY 6 HOURS PRN
Qty: 30 TABLET | Refills: 0 | Status: SHIPPED | OUTPATIENT
Start: 2019-02-18

## 2019-02-18 RX ORDER — NITROGLYCERIN 0.4 MG/1
0.4 TABLET SUBLINGUAL EVERY 5 MIN PRN
Status: DISCONTINUED | OUTPATIENT
Start: 2019-02-18 | End: 2019-02-19 | Stop reason: HOSPADM

## 2019-02-18 RX ADMIN — NITROGLYCERIN 0.4 MG: 0.4 TABLET, ORALLY DISINTEGRATING SUBLINGUAL at 20:29

## 2019-02-18 ASSESSMENT — PAIN DESCRIPTION - DESCRIPTORS: DESCRIPTORS: ACHING

## 2019-02-18 ASSESSMENT — PAIN DESCRIPTION - LOCATION: LOCATION: GENERALIZED

## 2019-02-18 ASSESSMENT — PAIN SCALES - GENERAL: PAINLEVEL_OUTOF10: 9

## 2019-02-18 ASSESSMENT — PAIN DESCRIPTION - FREQUENCY: FREQUENCY: CONTINUOUS

## 2019-02-18 ASSESSMENT — PAIN DESCRIPTION - PAIN TYPE: TYPE: ACUTE PAIN;CHRONIC PAIN

## 2019-02-19 PROCEDURE — 93010 ELECTROCARDIOGRAM REPORT: CPT | Performed by: INTERNAL MEDICINE

## 2019-02-19 ASSESSMENT — ENCOUNTER SYMPTOMS
BACK PAIN: 0
NAUSEA: 0
COUGH: 0
VOICE CHANGE: 0
SORE THROAT: 0
COLOR CHANGE: 0
ABDOMINAL PAIN: 0
SHORTNESS OF BREATH: 0
TROUBLE SWALLOWING: 0
VOMITING: 0
CONSTIPATION: 0
DIARRHEA: 0

## 2019-02-19 ASSESSMENT — HEART SCORE: ECG: 0

## 2019-09-21 ENCOUNTER — APPOINTMENT (OUTPATIENT)
Dept: CT IMAGING | Age: 54
End: 2019-09-21
Payer: MEDICAID

## 2019-09-21 ENCOUNTER — HOSPITAL ENCOUNTER (EMERGENCY)
Age: 54
Discharge: HOME | End: 2019-09-21
Attending: FAMILY MEDICINE
Payer: MEDICAID

## 2019-09-21 ENCOUNTER — APPOINTMENT (OUTPATIENT)
Dept: GENERAL RADIOLOGY | Age: 54
End: 2019-09-21
Payer: MEDICAID

## 2019-09-21 VITALS
SYSTOLIC BLOOD PRESSURE: 137 MMHG | TEMPERATURE: 98.6 F | DIASTOLIC BLOOD PRESSURE: 61 MMHG | HEART RATE: 80 BPM | RESPIRATION RATE: 16 BRPM | OXYGEN SATURATION: 97 % | WEIGHT: 200 LBS | HEIGHT: 62 IN | BODY MASS INDEX: 36.8 KG/M2

## 2019-09-21 DIAGNOSIS — M54.5 CHRONIC LOW BACK PAIN, UNSPECIFIED BACK PAIN LATERALITY, WITH SCIATICA PRESENCE UNSPECIFIED: ICD-10-CM

## 2019-09-21 DIAGNOSIS — R07.89 ATYPICAL CHEST PAIN: Primary | ICD-10-CM

## 2019-09-21 DIAGNOSIS — G89.29 CHRONIC LOW BACK PAIN, UNSPECIFIED BACK PAIN LATERALITY, WITH SCIATICA PRESENCE UNSPECIFIED: ICD-10-CM

## 2019-09-21 LAB
ALBUMIN SERPL-MCNC: 4.2 G/DL (ref 3.5–4.6)
ALP BLD-CCNC: 95 U/L (ref 40–130)
ALT SERPL-CCNC: 29 U/L (ref 0–33)
ANION GAP SERPL CALCULATED.3IONS-SCNC: 15 MEQ/L (ref 9–15)
AST SERPL-CCNC: 39 U/L (ref 0–35)
BASOPHILS ABSOLUTE: 0.1 K/UL (ref 0–0.2)
BASOPHILS RELATIVE PERCENT: 1 %
BILIRUB SERPL-MCNC: 0.4 MG/DL (ref 0.2–0.7)
BUN BLDV-MCNC: 11 MG/DL (ref 6–20)
CALCIUM SERPL-MCNC: 9.1 MG/DL (ref 8.5–9.9)
CHLORIDE BLD-SCNC: 101 MEQ/L (ref 95–107)
CK MB: 2.1 NG/ML (ref 0–3.8)
CO2: 26 MEQ/L (ref 20–31)
CREAT SERPL-MCNC: 0.87 MG/DL (ref 0.5–0.9)
CREATINE KINASE-MB INDEX: 1.3 % (ref 0–3.5)
D DIMER: 0.6 MG/L FEU (ref 0–0.5)
EKG ATRIAL RATE: 79 BPM
EKG P AXIS: 38 DEGREES
EKG P-R INTERVAL: 138 MS
EKG Q-T INTERVAL: 500 MS
EKG QRS DURATION: 74 MS
EKG QTC CALCULATION (BAZETT): 573 MS
EKG R AXIS: 33 DEGREES
EKG T AXIS: 64 DEGREES
EKG VENTRICULAR RATE: 79 BPM
EOSINOPHILS ABSOLUTE: 0.1 K/UL (ref 0–0.7)
EOSINOPHILS RELATIVE PERCENT: 1.7 %
GFR AFRICAN AMERICAN: >60
GFR NON-AFRICAN AMERICAN: >60
GLOBULIN: 3 G/DL (ref 2.3–3.5)
GLUCOSE BLD-MCNC: 123 MG/DL (ref 70–99)
HCT VFR BLD CALC: 41.4 % (ref 37–47)
HEMOGLOBIN: 13.7 G/DL (ref 12–16)
LYMPHOCYTES ABSOLUTE: 2.4 K/UL (ref 1–4.8)
LYMPHOCYTES RELATIVE PERCENT: 27.7 %
MCH RBC QN AUTO: 29 PG (ref 27–31.3)
MCHC RBC AUTO-ENTMCNC: 33.1 % (ref 33–37)
MCV RBC AUTO: 87.5 FL (ref 82–100)
MONOCYTES ABSOLUTE: 0.8 K/UL (ref 0.2–0.8)
MONOCYTES RELATIVE PERCENT: 9.5 %
NEUTROPHILS ABSOLUTE: 5.2 K/UL (ref 1.4–6.5)
NEUTROPHILS RELATIVE PERCENT: 60.1 %
PDW BLD-RTO: 14.9 % (ref 11.5–14.5)
PLATELET # BLD: 289 K/UL (ref 130–400)
POTASSIUM SERPL-SCNC: 3.6 MEQ/L (ref 3.4–4.9)
PRO-BNP: 33 PG/ML
RBC # BLD: 4.73 M/UL (ref 4.2–5.4)
SODIUM BLD-SCNC: 142 MEQ/L (ref 135–144)
TOTAL CK: 163 U/L (ref 0–170)
TOTAL PROTEIN: 7.2 G/DL (ref 6.3–8)
TROPONIN: <0.01 NG/ML (ref 0–0.01)
WBC # BLD: 8.7 K/UL (ref 4.8–10.8)

## 2019-09-21 PROCEDURE — 93005 ELECTROCARDIOGRAM TRACING: CPT | Performed by: FAMILY MEDICINE

## 2019-09-21 PROCEDURE — 82553 CREATINE MB FRACTION: CPT

## 2019-09-21 PROCEDURE — 85379 FIBRIN DEGRADATION QUANT: CPT

## 2019-09-21 PROCEDURE — 6360000002 HC RX W HCPCS: Performed by: FAMILY MEDICINE

## 2019-09-21 PROCEDURE — 84484 ASSAY OF TROPONIN QUANT: CPT

## 2019-09-21 PROCEDURE — 71275 CT ANGIOGRAPHY CHEST: CPT

## 2019-09-21 PROCEDURE — 36415 COLL VENOUS BLD VENIPUNCTURE: CPT

## 2019-09-21 PROCEDURE — 99285 EMERGENCY DEPT VISIT HI MDM: CPT

## 2019-09-21 PROCEDURE — 71045 X-RAY EXAM CHEST 1 VIEW: CPT

## 2019-09-21 PROCEDURE — 83880 ASSAY OF NATRIURETIC PEPTIDE: CPT

## 2019-09-21 PROCEDURE — 85025 COMPLETE CBC W/AUTO DIFF WBC: CPT

## 2019-09-21 PROCEDURE — 82550 ASSAY OF CK (CPK): CPT

## 2019-09-21 PROCEDURE — 96374 THER/PROPH/DIAG INJ IV PUSH: CPT

## 2019-09-21 PROCEDURE — 80053 COMPREHEN METABOLIC PANEL: CPT

## 2019-09-21 PROCEDURE — 6360000004 HC RX CONTRAST MEDICATION: Performed by: FAMILY MEDICINE

## 2019-09-21 RX ORDER — MORPHINE SULFATE 2 MG/ML
4 INJECTION, SOLUTION INTRAMUSCULAR; INTRAVENOUS ONCE
Status: COMPLETED | OUTPATIENT
Start: 2019-09-21 | End: 2019-09-21

## 2019-09-21 RX ADMIN — MORPHINE SULFATE 4 MG: 2 INJECTION, SOLUTION INTRAMUSCULAR; INTRAVENOUS at 19:07

## 2019-09-21 RX ADMIN — IOPAMIDOL 100 ML: 612 INJECTION, SOLUTION INTRAVENOUS at 17:50

## 2019-09-21 ASSESSMENT — PAIN DESCRIPTION - ORIENTATION: ORIENTATION: MID

## 2019-09-21 ASSESSMENT — PAIN SCALES - GENERAL
PAINLEVEL_OUTOF10: 8
PAINLEVEL_OUTOF10: 9

## 2019-09-21 ASSESSMENT — PAIN DESCRIPTION - LOCATION: LOCATION: CHEST

## 2019-09-21 ASSESSMENT — PAIN DESCRIPTION - FREQUENCY: FREQUENCY: CONTINUOUS

## 2019-09-21 ASSESSMENT — PAIN DESCRIPTION - DESCRIPTORS: DESCRIPTORS: SQUEEZING

## 2019-09-21 NOTE — ED NOTES
Pt resting in bed with family member at bedside, in no distress. Diet soda given. Denies any needs at this time.       Abdias Prescott RN  09/21/19 9601

## 2019-09-21 NOTE — ED PROVIDER NOTES
ALLERGIES     Asa [aspirin]    FAMILY HISTORY     History reviewed. No pertinent family history. SOCIAL HISTORY       Social History     Socioeconomic History    Marital status: Single     Spouse name: None    Number of children: None    Years of education: None    Highest education level: None   Occupational History    None   Social Needs    Financial resource strain: None    Food insecurity:     Worry: None     Inability: None    Transportation needs:     Medical: None     Non-medical: None   Tobacco Use    Smoking status: Never Smoker    Smokeless tobacco: Never Used   Substance and Sexual Activity    Alcohol use: No     Alcohol/week: 0.0 standard drinks    Drug use: No    Sexual activity: None   Lifestyle    Physical activity:     Days per week: None     Minutes per session: None    Stress: None   Relationships    Social connections:     Talks on phone: None     Gets together: None     Attends Restorationist service: None     Active member of club or organization: None     Attends meetings of clubs or organizations: None     Relationship status: None    Intimate partner violence:     Fear of current or ex partner: None     Emotionally abused: None     Physically abused: None     Forced sexual activity: None   Other Topics Concern    None   Social History Narrative    None       SCREENINGS      @FLOW([id number])@      PHYSICAL EXAM    (up to 7 for level 4, 8 or more for level 5)     ED Triage Vitals [09/21/19 1636]   BP Temp Temp Source Pulse Resp SpO2 Height Weight   139/81 98.6 °F (37 °C) Oral 84 16 96 % 5' 2\" (1.575 m) 200 lb (90.7 kg)       Physical Exam   Constitutional: She is oriented to person, place, and time. She appears well-developed and well-nourished. HENT:   Head: Normocephalic and atraumatic.    Right Ear: External ear normal.   Left Ear: External ear normal.   Nose: Nose normal.   Mouth/Throat: Oropharynx is clear and moist.   Eyes: Pupils are equal, round, and reactive to light. Neck: Normal range of motion. Neck supple. Cardiovascular: Normal rate, regular rhythm, normal heart sounds and intact distal pulses. Pulmonary/Chest: Effort normal and breath sounds normal. No respiratory distress. She has no wheezes. She has no rales. She exhibits no tenderness. Abdominal: Soft. Bowel sounds are normal.   Musculoskeletal: Normal range of motion. Neurological: She is alert and oriented to person, place, and time. She has normal strength. She displays normal reflexes. No cranial nerve deficit or sensory deficit. She exhibits normal muscle tone. Coordination normal.   Skin: Skin is warm and dry. Psychiatric: She has a normal mood and affect. Her behavior is normal. Judgment and thought content normal.   Nursing note and vitals reviewed. DIAGNOSTIC RESULTS     EKG: All EKG's are interpreted by the Emergency Department Physician who either signs or Co-signsthis chart in the absence of a cardiologist.    EKG: Normal sinus rhythm rate 79 nonspecific T wave changes no acute ST or T wave changes no change from previous EKG. RADIOLOGY:   Non-plain filmimages such as CT, Ultrasound and MRI are read by the radiologist. Plain radiographic images are visualized and preliminarily interpreted by the emergency physician with the below findings:     Interpretation per the Radiologist below, if available at the time ofthis note:    XR CHEST PORTABLE    (Results Pending)   CTA Chest W WO  (PE study)    (Results Pending)   No acute cardiopulmonary disease.   No acute PE      ED BEDSIDE ULTRASOUND:   Performed by ED Physician - none    LABS:  Labs Reviewed   COMPREHENSIVE METABOLIC PANEL - Abnormal; Notable for the following components:       Result Value    Glucose 123 (*)     AST 39 (*)     All other components within normal limits   CBC WITH AUTO DIFFERENTIAL - Abnormal; Notable for the following components:    RDW 14.9 (*)     All other components within normal limits   D-DIMER, Prescriptions    No medications on file          (Please note thatportions of this note were completed with a voice recognition program.  Efforts were made to edit the dictations but occasionally words are mis-transcribed.)    Micaela Antunez MD (electronically signed)  Attending Emergency Physician          Armand Merlos MD  09/22/19 2129

## 2019-09-22 ASSESSMENT — ENCOUNTER SYMPTOMS
BACK PAIN: 1
ALLERGIC/IMMUNOLOGIC NEGATIVE: 1
RESPIRATORY NEGATIVE: 1

## 2019-09-23 PROCEDURE — 93010 ELECTROCARDIOGRAM REPORT: CPT | Performed by: INTERNAL MEDICINE

## 2020-02-21 ENCOUNTER — HOSPITAL ENCOUNTER (EMERGENCY)
Age: 55
Discharge: HOME | End: 2020-02-21
Attending: EMERGENCY MEDICINE
Payer: MEDICAID

## 2020-02-21 ENCOUNTER — APPOINTMENT (OUTPATIENT)
Dept: CT IMAGING | Age: 55
End: 2020-02-21
Payer: MEDICAID

## 2020-02-21 VITALS
WEIGHT: 200 LBS | BODY MASS INDEX: 36.8 KG/M2 | RESPIRATION RATE: 20 BRPM | HEART RATE: 92 BPM | SYSTOLIC BLOOD PRESSURE: 170 MMHG | DIASTOLIC BLOOD PRESSURE: 86 MMHG | OXYGEN SATURATION: 100 % | TEMPERATURE: 98.1 F | HEIGHT: 62 IN

## 2020-02-21 LAB
BILIRUBIN URINE: NEGATIVE
BLOOD, URINE: NEGATIVE
CLARITY: CLEAR
COLOR: YELLOW
GLUCOSE URINE: 100 MG/DL
KETONES, URINE: 15 MG/DL
LEUKOCYTE ESTERASE, URINE: NEGATIVE
NITRITE, URINE: NEGATIVE
PH UA: 6 (ref 5–9)
PROTEIN UA: NEGATIVE MG/DL
SPECIFIC GRAVITY UA: 1.02 (ref 1–1.03)
URINE REFLEX TO CULTURE: ABNORMAL
UROBILINOGEN, URINE: 1 E.U./DL

## 2020-02-21 PROCEDURE — 6360000002 HC RX W HCPCS: Performed by: PHYSICIAN ASSISTANT

## 2020-02-21 PROCEDURE — 74150 CT ABDOMEN W/O CONTRAST: CPT

## 2020-02-21 PROCEDURE — 81003 URINALYSIS AUTO W/O SCOPE: CPT

## 2020-02-21 PROCEDURE — 99283 EMERGENCY DEPT VISIT LOW MDM: CPT

## 2020-02-21 PROCEDURE — 96372 THER/PROPH/DIAG INJ SC/IM: CPT

## 2020-02-21 RX ORDER — TIZANIDINE 4 MG/1
4 TABLET ORAL 3 TIMES DAILY PRN
Qty: 30 TABLET | Refills: 0 | Status: ON HOLD | OUTPATIENT
Start: 2020-02-21 | End: 2020-09-29

## 2020-02-21 RX ORDER — ORPHENADRINE CITRATE 30 MG/ML
60 INJECTION INTRAMUSCULAR; INTRAVENOUS ONCE
Status: COMPLETED | OUTPATIENT
Start: 2020-02-21 | End: 2020-02-21

## 2020-02-21 RX ORDER — MELOXICAM 7.5 MG/1
7.5 TABLET ORAL DAILY
Status: ON HOLD | COMMUNITY
End: 2020-09-29

## 2020-02-21 RX ORDER — KETOROLAC TROMETHAMINE 30 MG/ML
60 INJECTION, SOLUTION INTRAMUSCULAR; INTRAVENOUS ONCE
Status: COMPLETED | OUTPATIENT
Start: 2020-02-21 | End: 2020-02-21

## 2020-02-21 RX ADMIN — ORPHENADRINE CITRATE 60 MG: 30 INJECTION INTRAMUSCULAR; INTRAVENOUS at 20:40

## 2020-02-21 RX ADMIN — KETOROLAC TROMETHAMINE 60 MG: 30 INJECTION, SOLUTION INTRAMUSCULAR at 20:40

## 2020-02-21 ASSESSMENT — ENCOUNTER SYMPTOMS
EYE PAIN: 0
TROUBLE SWALLOWING: 0
COLOR CHANGE: 0
ABDOMINAL PAIN: 0
SHORTNESS OF BREATH: 0
BACK PAIN: 1
VOMITING: 0
ALLERGIC/IMMUNOLOGIC NEGATIVE: 1
DIARRHEA: 0
APNEA: 0

## 2020-02-21 ASSESSMENT — PAIN SCALES - GENERAL: PAINLEVEL_OUTOF10: 9

## 2020-02-22 NOTE — ED PROVIDER NOTES
[address] [facility] ED  eMERGENCYdEPARTMENT eNCOUnter      Pt Name: Dennis Wallace  MRN: [id number]  [place] 1965  Date of evaluation: 2/21/2020  Provider:Semaj Decker PA-C    CHIEF COMPLAINT       Chief Complaint   Patient presents with    Back Pain     patient complaining of  mid left back pain started 3 days ago         HISTORY OF PRESENT ILLNESS  (Location/Symptom, Timing/Onset, Context/Setting, Quality, Duration, Modifying Factors, Severity.)   Dennis Wallace is a 49 y.o. female who presents to the emergency department with a 3-day history of right flank pain that radiates into the right side of the abdomen. Patient denies nausea, vomiting or diarrhea. Patient denies any chest pain, palpitations or shortness of breath. Patient denies any recent cough or chest congestion. Patient states the pain is constant but worse with range of motion and movement. Patient has chronic pain issues and states that she has mentioned this to her pain doctor in the past but she states \"he does not do anything about it\". Patient has been on meloxicam for her shoulder pain but states there is no improvement in her back pain with this. Patient denies any saddle paresthesias or loss of bowel or bladder control. HPI    Nursing Notes were reviewed and I agree. REVIEW OF SYSTEMS    (2-9 systems for level 4, 10 or more for level 5)     Review of Systems   Constitutional: Negative for diaphoresis and fever. HENT: Negative for hearing loss and trouble swallowing. Eyes: Negative for pain. Respiratory: Negative for apnea and shortness of breath. Cardiovascular: Negative for chest pain. Gastrointestinal: Negative for abdominal pain, diarrhea and vomiting. Endocrine: Negative. Genitourinary: Positive for flank pain. Negative for hematuria. Musculoskeletal: Positive for back pain. Negative for neck pain and neck stiffness. Skin: Negative for color change. Allergic/Immunologic: Negative.     Neurological: General: No scleral icterus. Conjunctiva/sclera: Conjunctivae normal.      Pupils: Pupils are equal, round, and reactive to light. Neck:      Musculoskeletal: Normal range of motion and neck supple. Trachea: No tracheal deviation. Cardiovascular:      Rate and Rhythm: Normal rate. Heart sounds: Normal heart sounds. Pulmonary:      Effort: Pulmonary effort is normal. No respiratory distress. Breath sounds: Normal breath sounds. Abdominal:      General: Bowel sounds are normal. There is no distension. Palpations: Abdomen is soft. Tenderness: There is abdominal tenderness. There is right CVA tenderness. There is no guarding. Comments: No abdominal tenderness to palpation. No rashes to area   Musculoskeletal: Normal range of motion. Thoracic back: She exhibits tenderness. Back:    Skin:     General: Skin is warm and dry. Findings: No erythema or rash. Neurological:      Mental Status: She is alert and oriented to person, place, and time. Cranial Nerves: No cranial nerve deficit. Motor: No abnormal muscle tone. Psychiatric:         Behavior: Behavior normal.         Thought Content: Thought content normal.         Judgment: Judgment normal.           DIAGNOSTIC RESULTS     RADIOLOGY:   Non-plain film images such as CT, Ultrasound and MRI are read by the radiologist. Plain radiographic images are visualized and preliminarilyinterpreted by Jhon Ovalle PA-C with the below findings:    Neg    Interpretation per the Radiologist below, if available at the time of this note:    CT KIDNEY WO CONTRAST    (Results Pending)       LABS:  Labs Reviewed   URINE RT REFLEX TO CULTURE - Abnormal; Notable for the following components:       Result Value    Glucose, Ur 100 (*)     Ketones, Urine 15 (*)     All other components within normal limits       All other labs were within normal range or not returnedas of this dictation.     EMERGENCYDEPARTMENT COURSE and

## 2020-02-22 NOTE — ED TRIAGE NOTES
Patient complaining of left mid back pain for couple days. Patient denies heavy lifting or injury.   No medications taken pta

## 2020-02-22 NOTE — ED NOTES
Pt states that her pain is coming down well. It's currently a 6/10.       Burgess Milad RN  02/21/20 7604

## 2020-03-10 RX ORDER — ESTRADIOL 0.05 MG/D
FILM, EXTENDED RELEASE TRANSDERMAL
Qty: 8 PATCH | Refills: 11 | Status: SHIPPED | OUTPATIENT
Start: 2020-03-10 | End: 2021-05-03 | Stop reason: SDUPTHER

## 2020-07-30 ENCOUNTER — APPOINTMENT (OUTPATIENT)
Dept: CT IMAGING | Age: 55
End: 2020-07-30
Payer: MEDICAID

## 2020-07-30 ENCOUNTER — HOSPITAL ENCOUNTER (EMERGENCY)
Age: 55
Discharge: HOME | End: 2020-07-30
Attending: STUDENT IN AN ORGANIZED HEALTH CARE EDUCATION/TRAINING PROGRAM
Payer: MEDICAID

## 2020-07-30 VITALS
DIASTOLIC BLOOD PRESSURE: 91 MMHG | OXYGEN SATURATION: 100 % | HEART RATE: 65 BPM | WEIGHT: 200 LBS | RESPIRATION RATE: 16 BRPM | TEMPERATURE: 99.5 F | HEIGHT: 62 IN | SYSTOLIC BLOOD PRESSURE: 132 MMHG | BODY MASS INDEX: 36.8 KG/M2

## 2020-07-30 LAB
ALBUMIN SERPL-MCNC: 3.8 G/DL (ref 3.5–4.6)
ALP BLD-CCNC: 102 U/L (ref 40–130)
ALT SERPL-CCNC: 35 U/L (ref 0–33)
ANION GAP SERPL CALCULATED.3IONS-SCNC: 11 MEQ/L (ref 9–15)
APTT: 29 SEC (ref 24.4–36.8)
AST SERPL-CCNC: 41 U/L (ref 0–35)
BACTERIA: ABNORMAL /HPF
BASE EXCESS VENOUS: 4 (ref -3–3)
BASOPHILS ABSOLUTE: 0.1 K/UL (ref 0–0.2)
BASOPHILS RELATIVE PERCENT: 1.1 %
BILIRUB SERPL-MCNC: 0.4 MG/DL (ref 0.2–0.7)
BILIRUBIN URINE: NEGATIVE
BLOOD, URINE: NEGATIVE
BUN BLDV-MCNC: 7 MG/DL (ref 6–20)
CALCIUM IONIZED: 1.07 MMOL/L (ref 1.12–1.32)
CALCIUM SERPL-MCNC: 8.5 MG/DL (ref 8.5–9.9)
CHLORIDE BLD-SCNC: 97 MEQ/L (ref 95–107)
CLARITY: CLEAR
CO2: 30 MEQ/L (ref 20–31)
COLOR: ABNORMAL
CREAT SERPL-MCNC: 0.84 MG/DL (ref 0.5–0.9)
EKG ATRIAL RATE: 71 BPM
EKG P AXIS: 93 DEGREES
EKG P-R INTERVAL: 150 MS
EKG Q-T INTERVAL: 420 MS
EKG QRS DURATION: 78 MS
EKG QTC CALCULATION (BAZETT): 456 MS
EKG R AXIS: 24 DEGREES
EKG T AXIS: 33 DEGREES
EKG VENTRICULAR RATE: 71 BPM
EOSINOPHILS ABSOLUTE: 0.3 K/UL (ref 0–0.7)
EOSINOPHILS RELATIVE PERCENT: 3.1 %
EPITHELIAL CELLS, UA: ABNORMAL /HPF (ref 0–5)
FINE CASTS, UA: ABNORMAL /LPF (ref 0–5)
GFR AFRICAN AMERICAN: >60
GFR AFRICAN AMERICAN: >60
GFR NON-AFRICAN AMERICAN: >60
GFR NON-AFRICAN AMERICAN: >60
GLOBULIN: 3 G/DL (ref 2.3–3.5)
GLUCOSE BLD-MCNC: 221 MG/DL (ref 70–99)
GLUCOSE BLD-MCNC: 240 MG/DL (ref 60–115)
GLUCOSE URINE: NEGATIVE MG/DL
HAV IGM SER IA-ACNC: NORMAL
HCO3 VENOUS: 29.3 MMOL/L (ref 23–29)
HCT VFR BLD CALC: 44.1 % (ref 37–47)
HEMOGLOBIN: 13.7 GM/DL (ref 12–16)
HEMOGLOBIN: 14.3 G/DL (ref 12–16)
HEPATITIS B CORE IGM ANTIBODY: NORMAL
HEPATITIS B SURFACE ANTIGEN INTERPRETATION: NORMAL
HEPATITIS C ANTIBODY INTERPRETATION: NORMAL
HEPATITIS INTERPRETATION:: NORMAL
HYALINE CASTS: ABNORMAL /HPF (ref 0–5)
INR BLD: 1
KETONES, URINE: 15 MG/DL
LACTATE: 2.16 MMOL/L (ref 0.4–2)
LACTIC ACID: 3.7 MMOL/L (ref 0.5–2.2)
LEUKOCYTE ESTERASE, URINE: NEGATIVE
LIPASE: 26 U/L (ref 12–95)
LYMPHOCYTES ABSOLUTE: 3 K/UL (ref 1–4.8)
LYMPHOCYTES RELATIVE PERCENT: 35.8 %
MAGNESIUM: 1.4 MG/DL (ref 1.7–2.4)
MCH RBC QN AUTO: 28.5 PG (ref 27–31.3)
MCHC RBC AUTO-ENTMCNC: 32.5 % (ref 33–37)
MCV RBC AUTO: 87.6 FL (ref 82–100)
MONOCYTES ABSOLUTE: 0.6 K/UL (ref 0.2–0.8)
MONOCYTES RELATIVE PERCENT: 7 %
NEUTROPHILS ABSOLUTE: 4.5 K/UL (ref 1.4–6.5)
NEUTROPHILS RELATIVE PERCENT: 53 %
NITRITE, URINE: NEGATIVE
O2 SAT, VEN: 41 %
PCO2, VEN: 53.2 MM HG (ref 40–50)
PDW BLD-RTO: 14.8 % (ref 11.5–14.5)
PERFORMED ON: ABNORMAL
PH UA: 5.5 (ref 5–9)
PH VENOUS: 7.35 (ref 7.35–7.45)
PLATELET # BLD: 302 K/UL (ref 130–400)
PO2, VEN: 25 MM HG
POC CHLORIDE: 102 MEQ/L (ref 99–110)
POC CREATININE: 0.9 MG/DL (ref 0.6–1.1)
POC FIO2: 21
POC HEMATOCRIT: 40 % (ref 36–48)
POC POTASSIUM: 4.1 MEQ/L (ref 3.5–5.1)
POC SAMPLE TYPE: ABNORMAL
POC SODIUM: 141 MEQ/L (ref 136–145)
POTASSIUM SERPL-SCNC: 3.6 MEQ/L (ref 3.4–4.9)
PROTEIN UA: 30 MG/DL
PROTHROMBIN TIME: 13.2 SEC (ref 12.3–14.9)
RBC # BLD: 5.03 M/UL (ref 4.2–5.4)
RBC UA: ABNORMAL /HPF (ref 0–2)
SODIUM BLD-SCNC: 138 MEQ/L (ref 135–144)
SPECIFIC GRAVITY UA: 1.02 (ref 1–1.03)
TCO2 CALC VENOUS: 31 MMOL/L
TOTAL CK: 88 U/L (ref 0–170)
TOTAL PROTEIN: 6.8 G/DL (ref 6.3–8)
TROPONIN: <0.01 NG/ML (ref 0–0.01)
URINE REFLEX TO CULTURE: ABNORMAL
UROBILINOGEN, URINE: 1 E.U./DL
WBC # BLD: 8.5 K/UL (ref 4.8–10.8)
WBC UA: ABNORMAL /HPF (ref 0–5)

## 2020-07-30 PROCEDURE — 74177 CT ABD & PELVIS W/CONTRAST: CPT

## 2020-07-30 PROCEDURE — 99284 EMERGENCY DEPT VISIT MOD MDM: CPT

## 2020-07-30 PROCEDURE — 36600 WITHDRAWAL OF ARTERIAL BLOOD: CPT

## 2020-07-30 PROCEDURE — 85610 PROTHROMBIN TIME: CPT

## 2020-07-30 PROCEDURE — 82435 ASSAY OF BLOOD CHLORIDE: CPT

## 2020-07-30 PROCEDURE — 85025 COMPLETE CBC W/AUTO DIFF WBC: CPT

## 2020-07-30 PROCEDURE — 82565 ASSAY OF CREATININE: CPT

## 2020-07-30 PROCEDURE — 82330 ASSAY OF CALCIUM: CPT

## 2020-07-30 PROCEDURE — 96374 THER/PROPH/DIAG INJ IV PUSH: CPT

## 2020-07-30 PROCEDURE — 2580000003 HC RX 258: Performed by: STUDENT IN AN ORGANIZED HEALTH CARE EDUCATION/TRAINING PROGRAM

## 2020-07-30 PROCEDURE — 36415 COLL VENOUS BLD VENIPUNCTURE: CPT

## 2020-07-30 PROCEDURE — 93005 ELECTROCARDIOGRAM TRACING: CPT | Performed by: STUDENT IN AN ORGANIZED HEALTH CARE EDUCATION/TRAINING PROGRAM

## 2020-07-30 PROCEDURE — 84132 ASSAY OF SERUM POTASSIUM: CPT

## 2020-07-30 PROCEDURE — 80074 ACUTE HEPATITIS PANEL: CPT

## 2020-07-30 PROCEDURE — 2500000003 HC RX 250 WO HCPCS: Performed by: STUDENT IN AN ORGANIZED HEALTH CARE EDUCATION/TRAINING PROGRAM

## 2020-07-30 PROCEDURE — 83735 ASSAY OF MAGNESIUM: CPT

## 2020-07-30 PROCEDURE — 85014 HEMATOCRIT: CPT

## 2020-07-30 PROCEDURE — 83605 ASSAY OF LACTIC ACID: CPT

## 2020-07-30 PROCEDURE — 81001 URINALYSIS AUTO W/SCOPE: CPT

## 2020-07-30 PROCEDURE — 82550 ASSAY OF CK (CPK): CPT

## 2020-07-30 PROCEDURE — 6360000002 HC RX W HCPCS: Performed by: STUDENT IN AN ORGANIZED HEALTH CARE EDUCATION/TRAINING PROGRAM

## 2020-07-30 PROCEDURE — 84484 ASSAY OF TROPONIN QUANT: CPT

## 2020-07-30 PROCEDURE — 96361 HYDRATE IV INFUSION ADD-ON: CPT

## 2020-07-30 PROCEDURE — 96375 TX/PRO/DX INJ NEW DRUG ADDON: CPT

## 2020-07-30 PROCEDURE — 6360000004 HC RX CONTRAST MEDICATION: Performed by: STUDENT IN AN ORGANIZED HEALTH CARE EDUCATION/TRAINING PROGRAM

## 2020-07-30 PROCEDURE — 83690 ASSAY OF LIPASE: CPT

## 2020-07-30 PROCEDURE — 85730 THROMBOPLASTIN TIME PARTIAL: CPT

## 2020-07-30 PROCEDURE — 82803 BLOOD GASES ANY COMBINATION: CPT

## 2020-07-30 PROCEDURE — 80053 COMPREHEN METABOLIC PANEL: CPT

## 2020-07-30 PROCEDURE — 84295 ASSAY OF SERUM SODIUM: CPT

## 2020-07-30 RX ORDER — ONDANSETRON 4 MG/1
4 TABLET, ORALLY DISINTEGRATING ORAL EVERY 8 HOURS PRN
Qty: 5 TABLET | Refills: 0 | Status: SHIPPED | OUTPATIENT
Start: 2020-07-30 | End: 2020-08-13 | Stop reason: SDUPTHER

## 2020-07-30 RX ORDER — KETOROLAC TROMETHAMINE 30 MG/ML
30 INJECTION, SOLUTION INTRAMUSCULAR; INTRAVENOUS ONCE
Status: COMPLETED | OUTPATIENT
Start: 2020-07-30 | End: 2020-07-30

## 2020-07-30 RX ORDER — DICYCLOMINE HYDROCHLORIDE 10 MG/1
10 CAPSULE ORAL EVERY 6 HOURS PRN
Qty: 20 CAPSULE | Refills: 0 | Status: SHIPPED | OUTPATIENT
Start: 2020-07-30 | End: 2020-08-13 | Stop reason: SDUPTHER

## 2020-07-30 RX ORDER — ONDANSETRON 2 MG/ML
4 INJECTION INTRAMUSCULAR; INTRAVENOUS ONCE
Status: COMPLETED | OUTPATIENT
Start: 2020-07-30 | End: 2020-07-30

## 2020-07-30 RX ORDER — 0.9 % SODIUM CHLORIDE 0.9 %
1000 INTRAVENOUS SOLUTION INTRAVENOUS ONCE
Status: COMPLETED | OUTPATIENT
Start: 2020-07-30 | End: 2020-07-30

## 2020-07-30 RX ORDER — FAMOTIDINE 20 MG/1
20 TABLET, FILM COATED ORAL 2 TIMES DAILY
Qty: 14 TABLET | Refills: 0 | Status: SHIPPED | OUTPATIENT
Start: 2020-07-30 | End: 2020-08-18

## 2020-07-30 RX ADMIN — KETOROLAC TROMETHAMINE 30 MG: 30 INJECTION, SOLUTION INTRAMUSCULAR at 20:19

## 2020-07-30 RX ADMIN — ONDANSETRON 4 MG: 2 INJECTION INTRAMUSCULAR; INTRAVENOUS at 20:19

## 2020-07-30 RX ADMIN — IOPAMIDOL 100 ML: 612 INJECTION, SOLUTION INTRAVENOUS at 21:02

## 2020-07-30 RX ADMIN — FAMOTIDINE 20 MG: 10 INJECTION INTRAVENOUS at 20:19

## 2020-07-30 RX ADMIN — SODIUM CHLORIDE 1000 ML: 9 INJECTION, SOLUTION INTRAVENOUS at 20:19

## 2020-07-30 ASSESSMENT — PAIN DESCRIPTION - FREQUENCY
FREQUENCY: INTERMITTENT
FREQUENCY: CONTINUOUS

## 2020-07-30 ASSESSMENT — ENCOUNTER SYMPTOMS
DIARRHEA: 1
SHORTNESS OF BREATH: 0
VOMITING: 1
BACK PAIN: 0
NAUSEA: 1
TROUBLE SWALLOWING: 0
CHEST TIGHTNESS: 0
ABDOMINAL PAIN: 1
COUGH: 0
SINUS PRESSURE: 0

## 2020-07-30 ASSESSMENT — PAIN DESCRIPTION - LOCATION
LOCATION: ABDOMEN
LOCATION: ABDOMEN

## 2020-07-30 ASSESSMENT — PAIN DESCRIPTION - PAIN TYPE
TYPE: ACUTE PAIN
TYPE: ACUTE PAIN

## 2020-07-30 ASSESSMENT — PAIN DESCRIPTION - DESCRIPTORS
DESCRIPTORS: ACHING
DESCRIPTORS: PRESSURE;SQUEEZING

## 2020-07-30 ASSESSMENT — PAIN SCALES - GENERAL
PAINLEVEL_OUTOF10: 5
PAINLEVEL_OUTOF10: 9
PAINLEVEL_OUTOF10: 9

## 2020-07-30 ASSESSMENT — PAIN DESCRIPTION - ORIENTATION: ORIENTATION: UPPER;MID

## 2020-07-30 NOTE — ED TRIAGE NOTES
Pt states that she he having upper ABD/ epi-gastric pain that started on Monday. Pt states that her pain has been increasing since then and she is not able to get comfortable. Pt states she may have eaten \"something bad\". Pt has also been having loose stool. Pt states pain is intermittent. Pt states she has had minimal vomiting and last vomited last night.

## 2020-07-31 LAB
GFR AFRICAN AMERICAN: >60
GFR NON-AFRICAN AMERICAN: >60
PERFORMED ON: NORMAL
POC CREATININE: 0.8 MG/DL (ref 0.6–1.1)
POC SAMPLE TYPE: NORMAL

## 2020-07-31 PROCEDURE — 93010 ELECTROCARDIOGRAM REPORT: CPT | Performed by: INTERNAL MEDICINE

## 2020-07-31 NOTE — ED NOTES
Patient c/o abdominal pain, started on Monday. +Nausea/Vomiting.       Mariana Albert RN  07/30/20 2031

## 2020-07-31 NOTE — ED PROVIDER NOTES
[address] [facility] ED  eMERGENCY dEPARTMENT eNCOUnter      Pt Name: Michael Ferrer  MRN: [id number]  [place] 1965  Date of evaluation: 7/30/2020  Provider: Arden Patten [address] [place] [address]       Chief Complaint   Patient presents with    Abdominal Pain         HISTORY OF PRESENT ILLNESS   (Location/Symptom, Timing/Onset,Context/Setting, Quality, Duration, Modifying Factors, Severity)  Note limiting factors. Michael Ferrer is a 48 y.o. female who presents to the emergency department with complaint of 4 days of epigastric abdominal pain that is described as pressure. Patient states that she feels like there is something that is in there like an object. On physical exam the patient is a palpable xiphoid process but there is no palpable mass. Patient has had a few loose stools but no blood or mucus in her stool. Patient had vomited last night. Patient did not vomit today but feels nauseated. Patient states eating and drinking makes her nausea and abdominal pain worse. Nothing has made it better. Patient denies any fever, chills or cough. The history is provided by the patient and a friend. The history is limited by a language barrier. A  was used. NursingNotes were reviewed. REVIEW OF SYSTEMS    (2-9 systems for level 4, 10 or more for level 5)     Review of Systems   Constitutional: Negative for activity change, appetite change, chills, fever and unexpected weight change. HENT: Negative for drooling, ear pain, nosebleeds, sinus pressure and trouble swallowing. Respiratory: Negative for cough, chest tightness and shortness of breath. Cardiovascular: Negative for chest pain and leg swelling. Gastrointestinal: Positive for abdominal pain, diarrhea, nausea and vomiting. Endocrine: Negative for polydipsia and polyphagia. Genitourinary: Negative for dysuria, flank pain and frequency. Musculoskeletal: Negative for back pain and myalgias.    Skin: Negative for pallor and rash. Neurological: Negative for syncope, weakness and headaches. Hematological: Does not bruise/bleed easily. All other systems reviewed and are negative. Except as noted above the remainder of the review of systems was reviewed and negative. PAST MEDICAL HISTORY     Past Medical History:   Diagnosis Date    Arthritis     [facility] disorder ([facility] [address]     DM (diabetes mellitus) ([facility] [address]     Dyslipidemia     Fibromyalgia     GERD (gastroesophageal reflux disease)     HTN (hypertension)     Hypothyroidism          SURGICALHISTORY       Past Surgical History:   Procedure Laterality Date    CHOLECYSTECTOMY      HYSTERECTOMY  07/28/2016    DR Roula Mo    THYROID SURGERY           CURRENT MEDICATIONS       Previous Medications    ACETAMINOPHEN (APAP EXTRA STRENGTH) 500 MG TABLET    Take 2 tablets by mouth every 6 hours as needed for Pain    ATORVASTATIN (LIPITOR) 20 MG TABLET    Take 20 mg by mouth nightly    ESTRADIOL (VIVELLE) 0.05 MG/24HR    APPLY 1 PATCH EXTERNALLY TO THE SKIN 2 TIMES A WEEK    FLUOXETINE (PROZAC) 20 MG CAPSULE    Take 1 capsule by mouth daily. GLIPIZIDE (GLUCOTROL) 5 MG TABLET    Take 1 tablet by mouth Daily. INSULIN GLARGINE (LANTUS) 100 UNIT/ML INJECTION VIAL    Inject 15 Units into the skin nightly    LEVOTHYROXINE (SYNTHROID) 150 MCG TABLET    Take 150 mcg by mouth every morning (before breakfast)    LISINOPRIL (PRINIVIL;ZESTRIL) 10 MG TABLET    Take 1 tablet by mouth daily DO NOT TAKE IF SYSTOLIC BP (TOP NUMBER) IS LESS THAN 100    LORATADINE (CLARITIN) 10 MG TABLET    Take 1 tablet by mouth daily.     MELOXICAM (MOBIC) 7.5 MG TABLET    Take 7.5 mg by mouth daily    METFORMIN, MOD, (GLUMETZA) 500 MG EXTENDED RELEASE TABLET    Take 1,000 mg by mouth 2 times daily (with meals)    OMEPRAZOLE (PRILOSEC) 20 MG DELAYED RELEASE CAPSULE    Take 20 mg by mouth every morning (before breakfast)    QUETIAPINE (SEROQUEL) 100 MG TABLET    1/2 TO 1 TAB Q HS    TIZANIDINE toxic-appearing or diaphoretic. Comments: No photophobia. No phonophobia. HENT:      Head: Normocephalic and atraumatic. No Dinh's sign. Right Ear: External ear normal.      Left Ear: External ear normal.      Nose: Nose normal. No congestion or rhinorrhea. Mouth/Throat:      Mouth: Mucous membranes are moist.      Pharynx: Oropharynx is clear. No oropharyngeal exudate or posterior oropharyngeal erythema. Eyes:      General: No scleral icterus. Right eye: No foreign body or discharge. Left eye: No discharge. Extraocular Movements: Extraocular movements intact. Conjunctiva/sclera: Conjunctivae normal.      Left eye: No exudate. Pupils: Pupils are equal, round, and reactive to light. Neck:      Musculoskeletal: Normal range of motion and neck supple. No neck rigidity. Vascular: No JVD. Trachea: No tracheal deviation. Comments: No meningismus. Cardiovascular:      Rate and Rhythm: Normal rate and regular rhythm. Heart sounds: Normal heart sounds. Heart sounds not distant. No murmur. No friction rub. No gallop. Pulmonary:      Effort: Pulmonary effort is normal. No respiratory distress. Breath sounds: Normal breath sounds. No stridor. No wheezing, rhonchi or rales. Chest:      Chest wall: No tenderness. Abdominal:      General: Abdomen is flat. Bowel sounds are normal. There is no distension or abdominal bruit. There are no signs of injury. Palpations: Abdomen is soft. There is no shifting dullness, fluid wave, hepatomegaly, splenomegaly, mass or pulsatile mass. Tenderness: There is abdominal tenderness in the epigastric area. There is guarding. There is no right CVA tenderness, left CVA tenderness or rebound. Negative signs include Galicia's sign, Rovsing's sign and McBurney's sign. Hernia: No hernia is present. Musculoskeletal: Normal range of motion.          General: No swelling, tenderness, deformity or signs of normal limits   COMPREHENSIVE METABOLIC PANEL - Abnormal; Notable for the following components:    Glucose 221 (*)     ALT 35 (*)     AST 41 (*)     All other components within normal limits   LACTIC ACID, PLASMA - Abnormal; Notable for the following components:    Lactic Acid 3.7 (*)     All other components within normal limits    Narrative:     CALL  [place]  [facility] tel. [phone],  LACID results called to and read back by Wally CASTILLO, 07/30/2020 21:09, by  [facility]   URINE RT REFLEX TO CULTURE - Abnormal; Notable for the following components:    Color, UA DARK YELLOW (*)     Ketones, Urine 15 (*)     Protein, UA 30 (*)     All other components within normal limits   MAGNESIUM - Abnormal; Notable for the following components:    Magnesium 1.4 (*)     All other components within normal limits   POCT VENOUS - Abnormal; Notable for the following components:    POC Glucose 240 (*)     Calcium, Ion 1.07 (*)     pH, Antonio 7.349 (*)     pCO2, Antonio 53.2 (*)     HCO3, Venous 29.3 (*)     Base Excess, Antonio 4 (*)     Lactate 2.16 (*)     All other components within normal limits   PROTIME-INR   APTT   LIPASE   TROPONIN   HEPATITIS PANEL, ACUTE   CK   MICROSCOPIC URINALYSIS       All other labs were within normal range or not returned as of this dictation. EMERGENCY DEPARTMENT COURSE and DIFFERENTIAL DIAGNOSIS/MDM:   Vitals:    Vitals:    07/30/20 2022 07/30/20 2032 07/30/20 2130 07/30/20 2206   BP: (!) 145/87 129/70 138/73 (!) 132/91   Pulse: 71 73 67 65   Resp: 16 16 16 16   Temp:       TempSrc:       SpO2: 99% 100% 100% 100%   Weight:       Height:               MDM  CT scan is unremarkable. The ER doctor called stat rad and spoke with the radiologist.  The patient does not have mesenteric adenitis. Patient likely has some type of self-limited foodborne illness. It has not resulted in colitis or enteritis. Antibiotics are not recommended. After IV fluids the patient was feeling better.   Lactic acid was elevated and repeat lactic 8 after IV fluid hydration has come down. On reexam the patient is nontoxic. The findings were discussed with her. She verbalized understanding care and has no further questions. CONSULTS:  None    PROCEDURES:  Unless otherwise noted below, none     Procedures    FINAL IMPRESSION      1. Nausea vomiting and diarrhea    2. Dehydration    3.  Type 2 diabetes mellitus with hyperglycemia, unspecified whether long term insulin use [facility])          DISPOSITION/PLAN   DISPOSITION Decision To Discharge 07/30/2020 09:59:02 PM      PATIENT REFERRED TO:  Natalia Elias DO  [address] [place] [address]    Schedule an appointment as soon as possible for a visit in 1 day        DISCHARGE MEDICATIONS:  New Prescriptions    DICYCLOMINE (BENTYL) 10 MG CAPSULE    Take 1 capsule by mouth every 6 hours as needed (cramps)    FAMOTIDINE (PEPCID) 20 MG TABLET    Take 1 tablet by mouth 2 times daily for 7 days    ONDANSETRON (ZOFRAN ODT) 4 MG DISINTEGRATING TABLET    Take 1 tablet by mouth every 8 hours as needed for Nausea or Vomiting          (Please note that portions of this note were completed with a voice recognition program.  Efforts were made to edit the dictations but occasionally words are mis-transcribed.)    Carlene Zee DO (electronically signed)  Attending Emergency Physician          Carlene Zee DO  07/30/20 1836

## 2020-08-13 ENCOUNTER — APPOINTMENT (OUTPATIENT)
Dept: CT IMAGING | Age: 55
End: 2020-08-13
Payer: MEDICAID

## 2020-08-13 ENCOUNTER — HOSPITAL ENCOUNTER (EMERGENCY)
Age: 55
Discharge: HOME | End: 2020-08-13
Attending: EMERGENCY MEDICINE
Payer: MEDICAID

## 2020-08-13 VITALS
DIASTOLIC BLOOD PRESSURE: 80 MMHG | HEART RATE: 68 BPM | OXYGEN SATURATION: 99 % | WEIGHT: 200 LBS | BODY MASS INDEX: 35.44 KG/M2 | SYSTOLIC BLOOD PRESSURE: 142 MMHG | HEIGHT: 63 IN | RESPIRATION RATE: 18 BRPM | TEMPERATURE: 97.6 F

## 2020-08-13 LAB
ALBUMIN SERPL-MCNC: 3.9 G/DL (ref 3.5–4.6)
ALP BLD-CCNC: 102 U/L (ref 40–130)
ALT SERPL-CCNC: 47 U/L (ref 0–33)
ANION GAP SERPL CALCULATED.3IONS-SCNC: 16 MEQ/L (ref 9–15)
AST SERPL-CCNC: 49 U/L (ref 0–35)
BASOPHILS ABSOLUTE: 0.1 K/UL (ref 0–0.2)
BASOPHILS RELATIVE PERCENT: 1.1 %
BILIRUB SERPL-MCNC: 0.3 MG/DL (ref 0.2–0.7)
BUN BLDV-MCNC: 10 MG/DL (ref 6–20)
CALCIUM SERPL-MCNC: 9 MG/DL (ref 8.5–9.9)
CHLORIDE BLD-SCNC: 97 MEQ/L (ref 95–107)
CO2: 24 MEQ/L (ref 20–31)
CREAT SERPL-MCNC: 0.89 MG/DL (ref 0.5–0.9)
EOSINOPHILS ABSOLUTE: 0.2 K/UL (ref 0–0.7)
EOSINOPHILS RELATIVE PERCENT: 2.4 %
GFR AFRICAN AMERICAN: >60
GFR NON-AFRICAN AMERICAN: >60
GLOBULIN: 2.9 G/DL (ref 2.3–3.5)
GLUCOSE BLD-MCNC: 285 MG/DL (ref 70–99)
HCT VFR BLD CALC: 42.7 % (ref 37–47)
HEMOGLOBIN: 14.3 G/DL (ref 12–16)
LIPASE: 36 U/L (ref 12–95)
LYMPHOCYTES ABSOLUTE: 2.8 K/UL (ref 1–4.8)
LYMPHOCYTES RELATIVE PERCENT: 32.6 %
MCH RBC QN AUTO: 29.2 PG (ref 27–31.3)
MCHC RBC AUTO-ENTMCNC: 33.4 % (ref 33–37)
MCV RBC AUTO: 87.4 FL (ref 82–100)
MONOCYTES ABSOLUTE: 0.7 K/UL (ref 0.2–0.8)
MONOCYTES RELATIVE PERCENT: 8.2 %
NEUTROPHILS ABSOLUTE: 4.9 K/UL (ref 1.4–6.5)
NEUTROPHILS RELATIVE PERCENT: 55.7 %
PDW BLD-RTO: 15.2 % (ref 11.5–14.5)
PLATELET # BLD: 226 K/UL (ref 130–400)
POTASSIUM REFLEX MAGNESIUM: 4 MEQ/L (ref 3.4–4.9)
RBC # BLD: 4.89 M/UL (ref 4.2–5.4)
SODIUM BLD-SCNC: 137 MEQ/L (ref 135–144)
TOTAL PROTEIN: 6.8 G/DL (ref 6.3–8)
TROPONIN: <0.01 NG/ML (ref 0–0.01)
WBC # BLD: 8.7 K/UL (ref 4.8–10.8)

## 2020-08-13 PROCEDURE — 85025 COMPLETE CBC W/AUTO DIFF WBC: CPT

## 2020-08-13 PROCEDURE — 83690 ASSAY OF LIPASE: CPT

## 2020-08-13 PROCEDURE — 6370000000 HC RX 637 (ALT 250 FOR IP): Performed by: EMERGENCY MEDICINE

## 2020-08-13 PROCEDURE — 36415 COLL VENOUS BLD VENIPUNCTURE: CPT

## 2020-08-13 PROCEDURE — 6360000002 HC RX W HCPCS: Performed by: EMERGENCY MEDICINE

## 2020-08-13 PROCEDURE — 96375 TX/PRO/DX INJ NEW DRUG ADDON: CPT

## 2020-08-13 PROCEDURE — 80053 COMPREHEN METABOLIC PANEL: CPT

## 2020-08-13 PROCEDURE — 2500000003 HC RX 250 WO HCPCS: Performed by: EMERGENCY MEDICINE

## 2020-08-13 PROCEDURE — 6360000004 HC RX CONTRAST MEDICATION: Performed by: EMERGENCY MEDICINE

## 2020-08-13 PROCEDURE — 84484 ASSAY OF TROPONIN QUANT: CPT

## 2020-08-13 PROCEDURE — 2580000003 HC RX 258: Performed by: EMERGENCY MEDICINE

## 2020-08-13 PROCEDURE — 74177 CT ABD & PELVIS W/CONTRAST: CPT

## 2020-08-13 PROCEDURE — 99284 EMERGENCY DEPT VISIT MOD MDM: CPT

## 2020-08-13 PROCEDURE — 96374 THER/PROPH/DIAG INJ IV PUSH: CPT

## 2020-08-13 RX ORDER — CIPROFLOXACIN 500 MG/1
500 TABLET, FILM COATED ORAL ONCE
Status: COMPLETED | OUTPATIENT
Start: 2020-08-13 | End: 2020-08-13

## 2020-08-13 RX ORDER — MORPHINE SULFATE 2 MG/ML
4 INJECTION, SOLUTION INTRAMUSCULAR; INTRAVENOUS EVERY 30 MIN PRN
Status: DISCONTINUED | OUTPATIENT
Start: 2020-08-13 | End: 2020-08-13 | Stop reason: HOSPADM

## 2020-08-13 RX ORDER — DICYCLOMINE HYDROCHLORIDE 10 MG/1
10 CAPSULE ORAL EVERY 12 HOURS PRN
Qty: 10 CAPSULE | Refills: 0 | Status: SHIPPED | OUTPATIENT
Start: 2020-08-13 | End: 2020-08-18 | Stop reason: SDUPTHER

## 2020-08-13 RX ORDER — METRONIDAZOLE 500 MG/1
500 TABLET ORAL 2 TIMES DAILY
Qty: 20 TABLET | Refills: 0 | Status: SHIPPED | OUTPATIENT
Start: 2020-08-13 | End: 2020-08-23

## 2020-08-13 RX ORDER — CIPROFLOXACIN 500 MG/1
500 TABLET, FILM COATED ORAL 2 TIMES DAILY
Qty: 20 TABLET | Refills: 0 | Status: SHIPPED | OUTPATIENT
Start: 2020-08-13 | End: 2020-08-23

## 2020-08-13 RX ORDER — METRONIDAZOLE 500 MG/1
500 TABLET ORAL ONCE
Status: COMPLETED | OUTPATIENT
Start: 2020-08-13 | End: 2020-08-13

## 2020-08-13 RX ORDER — DICYCLOMINE HYDROCHLORIDE 10 MG/1
10 CAPSULE ORAL ONCE
Status: COMPLETED | OUTPATIENT
Start: 2020-08-13 | End: 2020-08-13

## 2020-08-13 RX ORDER — HYDROCODONE BITARTRATE AND ACETAMINOPHEN 5; 325 MG/1; MG/1
1 TABLET ORAL EVERY 4 HOURS PRN
Qty: 18 TABLET | Refills: 0 | Status: SHIPPED | OUTPATIENT
Start: 2020-08-13 | End: 2020-08-16

## 2020-08-13 RX ORDER — 0.9 % SODIUM CHLORIDE 0.9 %
1000 INTRAVENOUS SOLUTION INTRAVENOUS ONCE
Status: COMPLETED | OUTPATIENT
Start: 2020-08-13 | End: 2020-08-13

## 2020-08-13 RX ORDER — ONDANSETRON 4 MG/1
4 TABLET, ORALLY DISINTEGRATING ORAL EVERY 8 HOURS PRN
Qty: 10 TABLET | Refills: 0 | Status: SHIPPED | OUTPATIENT
Start: 2020-08-13 | End: 2020-11-27

## 2020-08-13 RX ORDER — ONDANSETRON 2 MG/ML
4 INJECTION INTRAMUSCULAR; INTRAVENOUS ONCE
Status: COMPLETED | OUTPATIENT
Start: 2020-08-13 | End: 2020-08-13

## 2020-08-13 RX ADMIN — SODIUM CHLORIDE 1000 ML: 9 INJECTION, SOLUTION INTRAVENOUS at 02:37

## 2020-08-13 RX ADMIN — FAMOTIDINE 20 MG: 10 INJECTION INTRAVENOUS at 02:38

## 2020-08-13 RX ADMIN — CIPROFLOXACIN 500 MG: 500 TABLET, FILM COATED ORAL at 04:24

## 2020-08-13 RX ADMIN — IOPAMIDOL 100 ML: 612 INJECTION, SOLUTION INTRAVENOUS at 03:32

## 2020-08-13 RX ADMIN — ONDANSETRON 4 MG: 2 INJECTION INTRAMUSCULAR; INTRAVENOUS at 02:38

## 2020-08-13 RX ADMIN — DICYCLOMINE HYDROCHLORIDE 10 MG: 10 CAPSULE ORAL at 04:24

## 2020-08-13 RX ADMIN — MORPHINE SULFATE 4 MG: 2 INJECTION, SOLUTION INTRAMUSCULAR; INTRAVENOUS at 02:38

## 2020-08-13 RX ADMIN — METRONIDAZOLE 500 MG: 500 TABLET, FILM COATED ORAL at 04:24

## 2020-08-13 ASSESSMENT — PAIN DESCRIPTION - DESCRIPTORS
DESCRIPTORS: ACHING
DESCRIPTORS: ACHING

## 2020-08-13 ASSESSMENT — PAIN DESCRIPTION - PAIN TYPE
TYPE: ACUTE PAIN
TYPE: ACUTE PAIN

## 2020-08-13 ASSESSMENT — PAIN DESCRIPTION - ORIENTATION: ORIENTATION: MID

## 2020-08-13 ASSESSMENT — PAIN SCALES - GENERAL
PAINLEVEL_OUTOF10: 5
PAINLEVEL_OUTOF10: 7
PAINLEVEL_OUTOF10: 9

## 2020-08-13 ASSESSMENT — PAIN DESCRIPTION - FREQUENCY: FREQUENCY: CONTINUOUS

## 2020-08-13 ASSESSMENT — PAIN DESCRIPTION - LOCATION
LOCATION: ABDOMEN
LOCATION: ABDOMEN

## 2020-08-13 NOTE — ED TRIAGE NOTES
Patient complains of abd pain that has been ongoing for 2 weeks. She was seen recently for it but the medication prescribed to her is not working per the patient. She complains of diarrhea. She is alert and orientated x 4. Pink, warm and dry. Abc's are intact. VSS. Afebrile. Will continue to monitor.

## 2020-08-15 NOTE — ED PROVIDER NOTES
eMERGENCY dEPARTMENT eNCOUnter      [facility]    Chief Complaint   Patient presents with    Abdominal Pain     2 weeks ago       AILYN Toney is a 48 y.o. female who presentsto ED from home  By private car  With complaint of Abdominal Cramping, Diarhhea  Onset 2 weeks  Intensity of symptoms moderate  Patient has intermittent abdominal cramping with diarrhea. Cramping is more on the left side of the abdomen. Stool is loose watery and not associated with blood. Patient also has nausea and vomiting. Patient denies any bad food or recent antibiotic usage. Patient also denies any other family member with similar symptoms. PAST MEDICAL HISTORY    Past Medical History:   Diagnosis Date    Arthritis     [facility] [address]     DM (diabetes mellitus) ([facility] [address]     Dyslipidemia     Fibromyalgia     GERD (gastroesophageal reflux disease)     HTN (hypertension)     Hypothyroidism        SURGICAL HISTORY    Past Surgical History:   Procedure Laterality Date    CHOLECYSTECTOMY      HYSTERECTOMY  07/28/2016    DR Darylene Robert THYROID SURGERY         CURRENT MEDICATIONS    Current Outpatient Rx   Medication Sig Dispense Refill    ondansetron (ZOFRAN ODT) 4 MG disintegrating tablet Take 1 tablet by mouth every 8 hours as needed for Nausea or Vomiting 10 tablet 0    ciprofloxacin (CIPRO) 500 MG tablet Take 1 tablet by mouth 2 times daily for 10 days 20 tablet 0    metroNIDAZOLE (FLAGYL) 500 MG tablet Take 1 tablet by mouth 2 times daily for 10 days 20 tablet 0    HYDROcodone-acetaminophen (NORCO) 5-325 MG per tablet Take 1 tablet by mouth every 4 hours as needed for Pain for up to 3 days. Intended supply: 3 days.  Take lowest dose possible to manage pain 18 tablet 0    dicyclomine (BENTYL) 10 MG capsule Take 1 capsule by mouth every 12 hours as needed (cramping) 10 capsule 0    famotidine (PEPCID) 20 MG tablet Take 1 tablet by mouth 2 times daily for 7 days 14 tablet 0    estradiol (Constancia Antonella) 0.05 MG/24HR APPLY 1 PATCH EXTERNALLY TO THE SKIN 2 TIMES A WEEK 8 patch 11    meloxicam (MOBIC) 7.5 MG tablet Take 7.5 mg by mouth daily      tiZANidine (ZANAFLEX) 4 MG tablet Take 1 tablet by mouth 3 times daily as needed (pain) 30 tablet 0    acetaminophen (APAP EXTRA STRENGTH) 500 MG tablet Take 2 tablets by mouth every 6 hours as needed for Pain 30 tablet 0    insulin glargine (LANTUS) 100 UNIT/ML injection vial Inject 15 Units into the skin nightly      atorvastatin (LIPITOR) 20 MG tablet Take 20 mg by mouth nightly      lisinopril (PRINIVIL;ZESTRIL) 10 MG tablet Take 1 tablet by mouth daily DO NOT TAKE IF SYSTOLIC BP (TOP NUMBER) IS LESS THAN 100 30 tablet 2    metFORMIN, MOD, (GLUMETZA) 500 MG extended release tablet Take 1,000 mg by mouth 2 times daily (with meals)      omeprazole (PRILOSEC) 20 MG delayed release capsule Take 20 mg by mouth every morning (before breakfast)      levothyroxine (SYNTHROID) 150 MCG tablet Take 150 mcg by mouth every morning (before breakfast)      glipiZIDE (GLUCOTROL) 5 MG tablet Take 1 tablet by mouth Daily. (Patient taking differently: Take 5 mg by mouth 2 times daily (before meals) ) 90 tablet 3    loratadine (CLARITIN) 10 MG tablet Take 1 tablet by mouth daily. 30 tablet 2    QUEtiapine (SEROQUEL) 100 MG tablet 1/2 TO 1 TAB Q HS (Patient taking differently: 1 TAB Q HS) 30 tablet 2    FLUoxetine (PROZAC) 20 MG capsule Take 1 capsule by mouth daily. (Patient taking differently: Take 30 mg by mouth daily ) 30 capsule 2       ALLERGIES    No Known Allergies    FAMILY HISTORY    History reviewed. No pertinent family history.     SOCIAL HISTORY    Social History     Socioeconomic History    Marital status: Single     Spouse name: None    Number of children: None    Years of education: None    Highest education level: None   Occupational History    None   Social Needs    Financial resource strain: None    Food insecurity     Worry: None     Inability: None    Transportation needs     Medical: None     Non-medical: None   Tobacco Use    Smoking status: Never Smoker    Smokeless tobacco: Never Used   Substance and Sexual Activity    Alcohol use: No     Alcohol/week: 0.0 standard drinks    Drug use: No    Sexual activity: None   Lifestyle    Physical activity     Days per week: None     Minutes per session: None    Stress: None   Relationships    Social connections     Talks on phone: None     Gets together: None     Attends Anabaptism service: None     Active member of club or organization: None     Attends meetings of clubs or organizations: None     Relationship status: None    Intimate partner violence     Fear of current or ex partner: None     Emotionally abused: None     Physically abused: None     Forced sexual activity: None   Other Topics Concern    None   Social History Narrative    None       REVIEW OF SYSTEMS    Constitutional:  Denies fever, chills, weight loss or weakness   Eyes:  Denies photophobia or discharge   HENT:  Denies sore throat or ear pain   Respiratory:  Denies cough or shortness of breath   Cardiovascular:  Denies chest pain, palpitations or swelling   GI:  C/o abdominal pain, nausea, vomiting,and diarrhea   Musculoskeletal:  Denies back pain   Skin:  Denies rash   Neurologic:  Denies headache, focal weakness or sensory changes   Endocrine:  Denies polyuria or polydypsia   Lymphatic:  Denies swollen glands   Psychiatric:  Denies depression, suicidal ideation or homicidal ideation   All systems negative except as marked. PHYSICAL EXAM    VITAL SIGNS: BP (!) 142/80   Pulse 68   Temp 97.6 °F (36.4 °C) (Oral)   Resp 18   Ht 5' 3\" (1.6 m)   Wt 200 lb (90.7 kg)   LMP 03/27/2016 (Exact Date)   SpO2 99%   BMI 35.43 kg/m²    Constitutional:  Well developed, Well nourished, No acute distress, Non-toxic appearance.    HENT:  Normocephalic, Atraumatic, Bilateral external ears normal, Oropharynx moist, No oral exudates, Nose normal. Neck- Normal range of motion, No tenderness, Supple, No stridor. Eyes:  PERRL, EOMI, Conjunctiva normal, No discharge. Respiratory:  Normal breath sounds, No respiratory distress, No wheezing, No chest tenderness. Cardiovascular:  Normal heart rate, Normal rhythm, No murmurs, No rubs, No gallops. GI:  Bowel sounds normal, Soft, LLQ and LUQ tenderness, No masses, No pulsatile masses. Galicia's Sign neg, No RLQ tenderness  : External genitalia appear normal, No masses or lesions. No discharge. No CVA tenderness. Musculoskeletal:  Intact distal pulses, No edema, No tenderness, No cyanosis, No clubbing. Good range of motion in all major joints. No tenderness to palpation or major deformities noted. Back- No tenderness. Integument:  Warm, Dry, No erythema, No rash. Lymphatic:  No lymphadenopathy noted. Neurologic:  Alert & oriented x 3, Normal motor function, Normal sensory function, No focal deficits noted. Psychiatric:  Affect normal, Judgment normal, Mood normal.       RADIOLOGY    CT ABDOMEN PELVIS W IV CONTRAST Additional Contrast? None   Final Result   1. Mild sigmoid colon diverticulosis. Nondistention of the colon. 2. The appendix is within normal limits. No bowel obstruction. No renal, ureteral or bladder calculi. No hydronephrosis or hydroureter. .   3. Severe diffuse fatty infiltration liver status post previous cholecystectomy. 4. Previous hysterectomy. REEVALUATION   Patient was updated the results of labs and Radiology.   Pain control adequate      Labs  Labs Reviewed   CBC WITH AUTO DIFFERENTIAL - Abnormal; Notable for the following components:       Result Value    RDW 15.2 (*)     All other components within normal limits   COMPREHENSIVE METABOLIC PANEL W/ REFLEX TO MG FOR LOW K - Abnormal; Notable for the following components:    Anion Gap 16 (*)     Glucose 285 (*)     ALT 47 (*)     AST 49 (*)     All other components within normal limits   LIPASE   TROPONIN   URINALYSIS

## 2020-08-18 ENCOUNTER — VIRTUAL VISIT (OUTPATIENT)
Dept: GASTROENTEROLOGY | Age: 55
End: 2020-08-18
Payer: MEDICAID

## 2020-08-18 PROCEDURE — 99215 OFFICE O/P EST HI 40 MIN: CPT | Performed by: NURSE PRACTITIONER

## 2020-08-18 RX ORDER — DICYCLOMINE HYDROCHLORIDE 10 MG/1
10 CAPSULE ORAL
Qty: 120 CAPSULE | Refills: 0 | Status: SHIPPED | OUTPATIENT
Start: 2020-08-18 | End: 2020-09-22

## 2020-08-18 RX ORDER — OMEPRAZOLE 20 MG/1
20 CAPSULE, DELAYED RELEASE ORAL DAILY
COMMUNITY
Start: 2020-05-06 | End: 2020-08-18

## 2020-08-18 RX ORDER — PANTOPRAZOLE SODIUM 40 MG/1
40 TABLET, DELAYED RELEASE ORAL
Qty: 90 TABLET | Refills: 1 | Status: SHIPPED | OUTPATIENT
Start: 2020-08-18 | End: 2020-10-06

## 2020-08-18 NOTE — PROGRESS NOTES
2020    TELEHEALTH EVALUATION -- Audio/Visual (During EMIKV-07 public health emergency)    Due to Mk 19 outbreak, patient's office visit was converted to a virtual visit. Patient was contacted and agreed to proceed with a virtual visit via Telephone Visit 30 minutes. The risks and benefits of converting to a virtual visit were discussed in light of the current infectious disease epidemic. Patient also understood that insurance coverage and co-pays are up to their individual insurance plans. HPI:  Bridget Goldberg (:  1965) has requested an audio/video evaluation for the following concern(s):  Patient with a constellation of GI complaints. Recent ED visit  (20) with acute onset of N/V/D. She was placed on ciprofloxacin and metronidazole. Patient with pudding stool 3-4 times p/d, improved. No melena or hematochezia. Nausea has improved, no complaints of emesis. Tolerating PO. No weight loss or loss of appetite. No one at home is sick and no recent travel. +DM on oral medications. No recent HGB A1C. Dicyclomine helping with lower abd cramping. -EtoH or NSAIDs. She denies recreational drug use. No previous endoscopic investigations. +family history of colon cancer (mother, brother, and maternal aunt). No F/C.   +GERD, no dysphagia. Taking omeprazole 20 mg with suboptimal response. +Epigastric pain. No CP, SOB, or palpitations. No syncopal episodes. Review of Systems   All other systems reviewed and are negative. Prior to Visit Medications    Medication Sig Taking?  Authorizing Provider   pantoprazole (PROTONIX) 40 MG tablet Take 1 tablet by mouth every morning (before breakfast) Yes FRANCA Jones CNP   dicyclomine (BENTYL) 10 MG capsule Take 1 capsule by mouth 4 times daily (before meals and nightly) Yes FRANCA Murray CNP   ondansetron (ZOFRAN ODT) 4 MG disintegrating tablet Take 1 tablet by mouth every 8 hours as needed for Nausea or Vomiting Yes Fam Jackson MD   ciprofloxacin (CIPRO) 500 MG tablet Take 1 tablet by mouth 2 times daily for 10 days Yes Fam Jackson MD   metroNIDAZOLE (FLAGYL) 500 MG tablet Take 1 tablet by mouth 2 times daily for 10 days Yes Fam Jackson MD   estradiol (VIVELLE) 0.05 MG/24HR APPLY 1 PATCH EXTERNALLY TO THE SKIN 2 TIMES A WEEK Yes Darrell Warner MD   meloxicam (MOBIC) 7.5 MG tablet Take 7.5 mg by mouth daily Yes Historical Provider, MD   tiZANidine (ZANAFLEX) 4 MG tablet Take 1 tablet by mouth 3 times daily as needed (pain) Yes Kavon Gil PA-C   acetaminophen (APAP EXTRA STRENGTH) 500 MG tablet Take 2 tablets by mouth every 6 hours as needed for Pain Yes Helen James, APRN - CNP   insulin glargine (LANTUS) 100 UNIT/ML injection vial Inject 15 Units into the skin nightly Yes Historical Provider, MD   atorvastatin (LIPITOR) 20 MG tablet Take 20 mg by mouth nightly Yes Historical Provider, MD   lisinopril (PRINIVIL;ZESTRIL) 10 MG tablet Take 1 tablet by mouth daily DO NOT TAKE IF SYSTOLIC BP (TOP NUMBER) IS LESS THAN 100 Yes Zoya Lozada PA-C   metFORMIN, MOD, (GLUMETZA) 500 MG extended release tablet Take 1,000 mg by mouth 2 times daily (with meals) Yes Historical Provider, MD   levothyroxine (SYNTHROID) 150 MCG tablet Take 150 mcg by mouth every morning (before breakfast) Yes Historical Provider, MD   glipiZIDE (GLUCOTROL) 5 MG tablet Take 1 tablet by mouth Daily. Patient taking differently: Take 5 mg by mouth 2 times daily (before meals)  Yes Tomy Cano,    loratadine (CLARITIN) 10 MG tablet Take 1 tablet by mouth daily. Yes Tomy Cano, DO   QUEtiapine (SEROQUEL) 100 MG tablet 1/2 TO 1 TAB Q HS  Patient taking differently: 1 TAB Q HS Yes Tomy Cano DO   FLUoxetine (PROZAC) 20 MG capsule Take 1 capsule by mouth daily.   Patient taking differently: Take 30 mg by mouth daily  Yes Tomy Cano, DO       Social History     Tobacco Use    Smoking status: Never Smoker    Smokeless Hepatic steatosis  - US FIBROSCAN; Future  3. Elevated transaminase level  - US FIBROSCAN;Future  4. Family history of colon cancer in mother  11. Lower abdominal pain  -Continue dicyclomine 10 mg 4 times daily  6. Epigastric pain  -PPI changed to pantoprazole 40 mg daily, if pain persist will proceed with upper endoscopy  7. Gastroesophageal reflux disease without esophagitis  - Acid suppression medication changed to Pantoprazole 40 mg   - Advised on the correct dose and timing of the PPI, Preferably 1/2 hour before breakfast. If symptoms are worse at night would recommend taking it half an hour before dinner.  - Pathophysiology and etiology of reflux discussed at length, with help of images. - Anti-reflux lifestyle modification discussed at length   --Avoid spicy acidic based foods   --Limit coffee, tea   --Limit the amount of food during meal time, avoid gorging   --Avoid bedtime snacks and eat meals 3 to 4 hrs before lying down   --Elevate the head of the bed with blocks   --Weight reduction advised and discussed at length   Return in about 2 weeks (around 9/1/2020). An  electronic signature was used to authenticate this note. --FRANCA Aguilar - CNP on 8/18/2020 at 11:22 AM      Pursuant to the emergency declaration under the [facility], 1135 waiver authority and the [employer] and [employer] Act, this Virtual  Visit was conducted, with patient's consent, to reduce the patient's risk of exposure to COVID-19 and provide continuity of care for an established patient. Services were provided through a video synchronous discussion virtually to substitute for in-person clinic visit.

## 2020-09-02 ENCOUNTER — ANCILLARY PROCEDURE (OUTPATIENT)
Dept: ENDOSCOPY | Age: 55
End: 2020-09-02
Payer: MEDICAID

## 2020-09-02 PROCEDURE — 91200 LIVER ELASTOGRAPHY: CPT

## 2020-09-04 ENCOUNTER — TELEPHONE (OUTPATIENT)
Dept: GASTROENTEROLOGY | Age: 55
End: 2020-09-04

## 2020-09-14 ENCOUNTER — TELEPHONE (OUTPATIENT)
Dept: GASTROENTEROLOGY | Age: 55
End: 2020-09-14

## 2020-09-15 ENCOUNTER — VIRTUAL VISIT (OUTPATIENT)
Dept: GASTROENTEROLOGY | Age: 55
End: 2020-09-15
Payer: MEDICAID

## 2020-09-15 PROCEDURE — 99214 OFFICE O/P EST MOD 30 MIN: CPT | Performed by: NURSE PRACTITIONER

## 2020-09-15 RX ORDER — POLYETHYLENE GLYCOL 3350, SODIUM CHLORIDE, SODIUM BICARBONATE, POTASSIUM CHLORIDE 420; 11.2; 5.72; 1.48 G/4L; G/4L; G/4L; G/4L
4000 POWDER, FOR SOLUTION ORAL ONCE
Qty: 1 BOTTLE | Refills: 0 | Status: SHIPPED | OUTPATIENT
Start: 2020-09-15 | End: 2020-09-15

## 2020-09-15 NOTE — PROGRESS NOTES
9/15/2020    TELEHEALTH EVALUATION -- Audio/Visual (During BICAI-85 public health emergency)    Due to COVID 19 outbreak, patient's office visit was converted to a virtual visit. Patient was contacted and agreed to proceed with a virtual visit via Telephone Visit 30 minutes   The risks and benefits of converting to a virtual visit were discussed in light of the current infectious disease epidemic. Patient also understood that insurance coverage and co-pays are up to their individual insurance plans. HPI:  Angel Toney (:  1965) has requested an audio/video evaluation for the following concern(s):  Patient with a new complaint of sharp rectal pain. No alleviating factors. Having a BM makes it worse. Has 3-4 loose stools p/d. Increased urgency. No melena or hematochezia. +family history of colon cancer. She denies weight loss or loss of appetite. +lower abd pain, intermittently. Does experience early satiety. No nausea or vomiting. New complaint of dysphagia to both liquids and solids, on occasion. Taking PPI with good response to GERD. Non-smoker. No alcohol use. No recreational drug use. No CP, SOB, or palpitations. No syncopal episodes. Review of Systems   All other systems reviewed and are negative. Prior to Visit Medications    Medication Sig Taking?  Authorizing Provider   polyethylene glycol-electrolytes (NULYTELY) 420 g solution Take 4,000 mLs by mouth once for 1 dose Yes FRANCA Padron CNP   pantoprazole (PROTONIX) 40 MG tablet Take 1 tablet by mouth every morning (before breakfast) Yes FRANCA Padron CNP   dicyclomine (BENTYL) 10 MG capsule Take 1 capsule by mouth 4 times daily (before meals and nightly) Yes FRANCA Padron CNP   ondansetron (ZOFRAN ODT) 4 MG disintegrating tablet Take 1 tablet by mouth every 8 hours as needed for Nausea or Vomiting Yes Lopez Burnham MD   estradiol (VIVELLE) 0.05 MG/24HR APPLY 1 PATCH EXTERNALLY TO THE SKIN 2 TIMES A WEEK Yes Darrell Warner MD   meloxicam (MOBIC) 7.5 MG tablet Take 7.5 mg by mouth daily Yes Historical Provider, MD   tiZANidine (ZANAFLEX) 4 MG tablet Take 1 tablet by mouth 3 times daily as needed (pain) Yes Sierra Salcedo PA-C   acetaminophen (APAP EXTRA STRENGTH) 500 MG tablet Take 2 tablets by mouth every 6 hours as needed for Pain Yes Addie James, APRN - CNP   insulin glargine (LANTUS) 100 UNIT/ML injection vial Inject 15 Units into the skin nightly Yes Historical Provider, MD   atorvastatin (LIPITOR) 20 MG tablet Take 20 mg by mouth nightly Yes Historical Provider, MD   lisinopril (PRINIVIL;ZESTRIL) 10 MG tablet Take 1 tablet by mouth daily DO NOT TAKE IF SYSTOLIC BP (TOP NUMBER) IS LESS THAN 100 Yes Arianna Benjamin PA-C   metFORMIN, MOD, (GLUMETZA) 500 MG extended release tablet Take 1,000 mg by mouth 2 times daily (with meals) Yes Historical Provider, MD   levothyroxine (SYNTHROID) 150 MCG tablet Take 150 mcg by mouth every morning (before breakfast) Yes Historical Provider, MD   glipiZIDE (GLUCOTROL) 5 MG tablet Take 1 tablet by mouth Daily. Patient taking differently: Take 5 mg by mouth 2 times daily (before meals)  Yes Tomy Cano DO   loratadine (CLARITIN) 10 MG tablet Take 1 tablet by mouth daily. Yes Tomy Cano DO   QUEtiapine (SEROQUEL) 100 MG tablet 1/2 TO 1 TAB Q HS  Patient taking differently: 1 TAB Q HS Yes Tomy Cano DO   FLUoxetine (PROZAC) 20 MG capsule Take 1 capsule by mouth daily.   Patient taking differently: Take 30 mg by mouth daily  Yes Tomy Cano, DO       Social History     Tobacco Use    Smoking status: Never Smoker    Smokeless tobacco: Never Used   Substance Use Topics    Alcohol use: No     Alcohol/week: 0.0 standard drinks    Drug use: No      No Known Allergies,   Past Medical History:   Diagnosis Date    Arthritis     [facility]     DM [facility]di[facility] mellitus[facility] ([facility]     Dyslipidemia     Fibromyalgia     GERD (gastroesophageal reflux disease)     HTN (hypertension)     Hypothyroidism    ,   Past Surgical History:   Procedure Laterality Date    CHOLECYSTECTOMY      HYSTERECTOMY  07/28/2016    DR Nadine Ventura    THYROID SURGERY     ,   Social History     Tobacco Use    Smoking status: Never Smoker    Smokeless tobacco: Never Used   Substance Use Topics    Alcohol use: No     Alcohol/week: 0.0 standard drinks    Drug use: No       Due to this being a TeleHealth encounter, evaluation of the following organ systems is limited: Vitals/Constitutional/EENT/Resp/CV/GI//MS/Neuro/Skin/Heme-Lymph-Imm. ASSESSMENT/PLAN:  60-year-old female patient with new complaint of rectal pain after a BM. No alarming symptoms such as weight loss, loss of appetite or bleeding. Strong family history of colon cancer. No previous colonoscopy. Will proceed with colonoscopy, the procedure was discussed at length including the risks and benefits. Split prep prescribed and discussed. Importance of the prep was emphasized to ensure a good exam.  The patient was counseled at length about the risks of lul Covid-19 during their perioperative period and any recovery window from their procedure. The patient was made aware that lul Covid-19  may worsen their prognosis for recovering from their procedure  and lend to a higher morbidity and/or mortality risk. All material risks, benefits, and reasonable alternatives including postponing the procedure were discussed. The patient does wish to proceed with the procedure at this time. Patient with loose stool, increased urgency. Recommend increasing dietary fiber. GERD with occasional dysphagia. Dysphagia likely due to esophageal spasms secondary to acid reflux. Antireflux lifestyle strongly recommended. Samples provided. Continue PPI 15 to 30 minutes before breakfast.  Will proceed with EGD to rule out Schatzki's ring, hiatal hernia, or esophagitis.  The procedure was discussed at length, including the risks and benefits. 1. Family history of colon cancer in mother  2. Early satiety  3. Dysphagia, unspecified type  4. Rectal pain  5. Gastroesophageal reflux disease, esophagitis presence not specified  6. CARBAJAL (nonalcoholic steatohepatitis)  - Fibroscan results discussed with patient. FibroScan reports stage IV fibrosis, moderate severe steatosis. Patient does have preserved synthetic liver function. Will proceed with liver biopsy for definitive diagnosis. Discussed with the patient natural history of fatty liver/Carbajal, therapeutic modalities and weight loss program.  Mentioned that as low as 3.5% of weight loss will be associated with improvement of steatosis. 5% with inflammation improvement, 7.5% weight loss will be associated with regression of fibrosis. Continue control of associated medical conditions in the term of diabetes mellitus and dyslipidemia. Multiple diets have been studied but more recent data supports a Mediterranean diet, examples provided. Patient referred to nutrition. Follow-up after endoscopic investigation  An  electronic signature was used to authenticate this note. --FRANCA Kevin - CNP on 9/15/2020 at 3:04 PM  [id number]}    Pursuant to the emergency declaration under the [facility], 1135 waiver authority and the [employer] and [employer] Act, this Virtual  Visit was conducted, with patient's consent, to reduce the patient's risk of exposure to COVID-19 and provide continuity of care for an established patient. Services were provided through a video synchronous discussion virtually to substitute for in-person clinic visit.

## 2020-09-20 ENCOUNTER — HOSPITAL ENCOUNTER (EMERGENCY)
Age: 55
Discharge: HOME | End: 2020-09-21
Payer: MEDICAID

## 2020-09-20 PROCEDURE — 99284 EMERGENCY DEPT VISIT MOD MDM: CPT

## 2020-09-20 ASSESSMENT — PAIN SCALES - GENERAL: PAINLEVEL_OUTOF10: 10

## 2020-09-20 ASSESSMENT — PAIN DESCRIPTION - ORIENTATION: ORIENTATION: RIGHT

## 2020-09-20 ASSESSMENT — PAIN DESCRIPTION - LOCATION: LOCATION: FLANK

## 2020-09-20 ASSESSMENT — PAIN DESCRIPTION - PAIN TYPE: TYPE: ACUTE PAIN

## 2020-09-21 ENCOUNTER — APPOINTMENT (OUTPATIENT)
Dept: CT IMAGING | Age: 55
End: 2020-09-21
Payer: MEDICAID

## 2020-09-21 VITALS
RESPIRATION RATE: 16 BRPM | WEIGHT: 198 LBS | DIASTOLIC BLOOD PRESSURE: 82 MMHG | SYSTOLIC BLOOD PRESSURE: 149 MMHG | HEART RATE: 80 BPM | OXYGEN SATURATION: 97 % | HEIGHT: 63 IN | TEMPERATURE: 97.9 F | BODY MASS INDEX: 35.08 KG/M2

## 2020-09-21 LAB
ALBUMIN SERPL-MCNC: 3.9 G/DL (ref 3.5–4.6)
ALP BLD-CCNC: 107 U/L (ref 40–130)
ALT SERPL-CCNC: 53 U/L (ref 0–33)
ANION GAP SERPL CALCULATED.3IONS-SCNC: 13 MEQ/L (ref 9–15)
AST SERPL-CCNC: 75 U/L (ref 0–35)
BASOPHILS ABSOLUTE: 0.1 K/UL (ref 0–0.2)
BASOPHILS RELATIVE PERCENT: 1.6 %
BILIRUB SERPL-MCNC: <0.2 MG/DL (ref 0.2–0.7)
BILIRUBIN URINE: NEGATIVE
BLOOD, URINE: NEGATIVE
BUN BLDV-MCNC: 16 MG/DL (ref 6–20)
CALCIUM SERPL-MCNC: 8.7 MG/DL (ref 8.5–9.9)
CHLORIDE BLD-SCNC: 99 MEQ/L (ref 95–107)
CLARITY: CLEAR
CO2: 25 MEQ/L (ref 20–31)
COLOR: YELLOW
CREAT SERPL-MCNC: 1.08 MG/DL (ref 0.5–0.9)
EKG ATRIAL RATE: 75 BPM
EKG P AXIS: 60 DEGREES
EKG P-R INTERVAL: 148 MS
EKG Q-T INTERVAL: 402 MS
EKG QRS DURATION: 90 MS
EKG QTC CALCULATION (BAZETT): 448 MS
EKG R AXIS: 30 DEGREES
EKG T AXIS: 41 DEGREES
EKG VENTRICULAR RATE: 75 BPM
EOSINOPHILS ABSOLUTE: 0.2 K/UL (ref 0–0.7)
EOSINOPHILS RELATIVE PERCENT: 2.4 %
GFR AFRICAN AMERICAN: >60
GFR NON-AFRICAN AMERICAN: 52.8
GLOBULIN: 2.7 G/DL (ref 2.3–3.5)
GLUCOSE BLD-MCNC: 231 MG/DL (ref 70–99)
GLUCOSE URINE: 100 MG/DL
HCT VFR BLD CALC: 42.5 % (ref 37–47)
HEMOGLOBIN: 14 G/DL (ref 12–16)
KETONES, URINE: NEGATIVE MG/DL
LEUKOCYTE ESTERASE, URINE: NEGATIVE
LIPASE: 67 U/L (ref 12–95)
LYMPHOCYTES ABSOLUTE: 2.8 K/UL (ref 1–4.8)
LYMPHOCYTES RELATIVE PERCENT: 36.4 %
MCH RBC QN AUTO: 28.9 PG (ref 27–31.3)
MCHC RBC AUTO-ENTMCNC: 33 % (ref 33–37)
MCV RBC AUTO: 87.6 FL (ref 82–100)
MONOCYTES ABSOLUTE: 0.6 K/UL (ref 0.2–0.8)
MONOCYTES RELATIVE PERCENT: 7.9 %
NEUTROPHILS ABSOLUTE: 4 K/UL (ref 1.4–6.5)
NEUTROPHILS RELATIVE PERCENT: 51.7 %
NITRITE, URINE: NEGATIVE
PDW BLD-RTO: 14.5 % (ref 11.5–14.5)
PH UA: 5 (ref 5–9)
PLATELET # BLD: 234 K/UL (ref 130–400)
PLATELET SLIDE REVIEW: ADEQUATE
POC CREATININE WHOLE BLOOD: 1
POTASSIUM SERPL-SCNC: 4.2 MEQ/L (ref 3.4–4.9)
PROTEIN UA: NEGATIVE MG/DL
RBC # BLD: 4.85 M/UL (ref 4.2–5.4)
RBC # BLD: NORMAL 10*6/UL
SLIDE REVIEW: NORMAL
SODIUM BLD-SCNC: 137 MEQ/L (ref 135–144)
SPECIFIC GRAVITY UA: 1.02 (ref 1–1.03)
TOTAL PROTEIN: 6.6 G/DL (ref 6.3–8)
TROPONIN: <0.01 NG/ML (ref 0–0.01)
URINE REFLEX TO CULTURE: ABNORMAL
UROBILINOGEN, URINE: 0.2 E.U./DL
WBC # BLD: 7.8 K/UL (ref 4.8–10.8)

## 2020-09-21 PROCEDURE — 84484 ASSAY OF TROPONIN QUANT: CPT

## 2020-09-21 PROCEDURE — 74177 CT ABD & PELVIS W/CONTRAST: CPT

## 2020-09-21 PROCEDURE — 93010 ELECTROCARDIOGRAM REPORT: CPT | Performed by: INTERNAL MEDICINE

## 2020-09-21 PROCEDURE — 93005 ELECTROCARDIOGRAM TRACING: CPT | Performed by: PHYSICIAN ASSISTANT

## 2020-09-21 PROCEDURE — 83690 ASSAY OF LIPASE: CPT

## 2020-09-21 PROCEDURE — 6360000002 HC RX W HCPCS: Performed by: PHYSICIAN ASSISTANT

## 2020-09-21 PROCEDURE — 81003 URINALYSIS AUTO W/O SCOPE: CPT

## 2020-09-21 PROCEDURE — 96375 TX/PRO/DX INJ NEW DRUG ADDON: CPT

## 2020-09-21 PROCEDURE — 36415 COLL VENOUS BLD VENIPUNCTURE: CPT

## 2020-09-21 PROCEDURE — 6360000004 HC RX CONTRAST MEDICATION: Performed by: PHYSICIAN ASSISTANT

## 2020-09-21 PROCEDURE — 96374 THER/PROPH/DIAG INJ IV PUSH: CPT

## 2020-09-21 PROCEDURE — 80053 COMPREHEN METABOLIC PANEL: CPT

## 2020-09-21 PROCEDURE — 6370000000 HC RX 637 (ALT 250 FOR IP): Performed by: PHYSICIAN ASSISTANT

## 2020-09-21 PROCEDURE — 85025 COMPLETE CBC W/AUTO DIFF WBC: CPT

## 2020-09-21 PROCEDURE — 2580000003 HC RX 258: Performed by: PHYSICIAN ASSISTANT

## 2020-09-21 RX ORDER — ONDANSETRON 2 MG/ML
4 INJECTION INTRAMUSCULAR; INTRAVENOUS ONCE
Status: COMPLETED | OUTPATIENT
Start: 2020-09-21 | End: 2020-09-21

## 2020-09-21 RX ORDER — 0.9 % SODIUM CHLORIDE 0.9 %
1000 INTRAVENOUS SOLUTION INTRAVENOUS ONCE
Status: COMPLETED | OUTPATIENT
Start: 2020-09-21 | End: 2020-09-21

## 2020-09-21 RX ORDER — MORPHINE SULFATE 2 MG/ML
4 INJECTION, SOLUTION INTRAMUSCULAR; INTRAVENOUS ONCE
Status: COMPLETED | OUTPATIENT
Start: 2020-09-21 | End: 2020-09-21

## 2020-09-21 RX ADMIN — ONDANSETRON 4 MG: 2 INJECTION INTRAMUSCULAR; INTRAVENOUS at 00:13

## 2020-09-21 RX ADMIN — MORPHINE SULFATE 4 MG: 2 INJECTION, SOLUTION INTRAMUSCULAR; INTRAVENOUS at 00:13

## 2020-09-21 RX ADMIN — IOPAMIDOL 100 ML: 612 INJECTION, SOLUTION INTRAVENOUS at 00:46

## 2020-09-21 RX ADMIN — SODIUM CHLORIDE 1000 ML: 9 INJECTION, SOLUTION INTRAVENOUS at 00:13

## 2020-09-21 RX ADMIN — MAGNESIUM CITRATE 296 ML: 1.75 LIQUID ORAL at 01:55

## 2020-09-21 ASSESSMENT — ENCOUNTER SYMPTOMS
NAUSEA: 1
DIARRHEA: 0
RHINORRHEA: 0
VOMITING: 0
COUGH: 0
ABDOMINAL PAIN: 1
SORE THROAT: 0
PHOTOPHOBIA: 0
EYE PAIN: 0
BACK PAIN: 0
SHORTNESS OF BREATH: 0

## 2020-09-21 ASSESSMENT — PAIN SCALES - GENERAL
PAINLEVEL_OUTOF10: 10
PAINLEVEL_OUTOF10: 3

## 2020-09-21 ASSESSMENT — PAIN DESCRIPTION - LOCATION: LOCATION: FLANK

## 2020-09-21 ASSESSMENT — PAIN DESCRIPTION - FREQUENCY: FREQUENCY: INTERMITTENT

## 2020-09-21 ASSESSMENT — PAIN DESCRIPTION - DESCRIPTORS: DESCRIPTORS: BURNING

## 2020-09-21 ASSESSMENT — PAIN DESCRIPTION - PAIN TYPE: TYPE: ACUTE PAIN

## 2020-09-21 ASSESSMENT — PAIN DESCRIPTION - ORIENTATION: ORIENTATION: RIGHT

## 2020-09-21 NOTE — ED PROVIDER NOTES
[facility] ED  EMERGENCY DEPARTMENT ENCOUNTER      Pt Name: Bridget Goldberg  MRN: [id number]  [place] 1965  Date of evaluation: 9/20/2020  Provider: Madeleine Johns PA-C      HISTORY OF PRESENT ILLNESS    Bridget Goldberg is a 48 y.o. female who presents to the Emergency Department with right sided abdominal pain described as cramping with associated nausea. The pain started tonight. She denies cp sob. Pt is scheduled to have endoscopy 9/29. REVIEW OF SYSTEMS       Review of Systems   Constitutional: Negative for chills, diaphoresis, fatigue and fever. HENT: Negative for congestion, rhinorrhea and sore throat. Eyes: Negative for photophobia and pain. Respiratory: Negative for cough and shortness of breath. Cardiovascular: Negative for chest pain and palpitations. Gastrointestinal: Positive for abdominal pain and nausea. Negative for diarrhea and vomiting. Genitourinary: Negative for dysuria and flank pain. Musculoskeletal: Negative for back pain. Skin: Negative for rash. Neurological: Negative for dizziness, light-headedness and headaches. Psychiatric/Behavioral: Negative. All other systems reviewed and are negative.         PAST MEDICAL HISTORY     Past Medical History:   Diagnosis Date    Arthritis     [facility]     DM (diabetes mellitus) ([facility]     Dyslipidemia     Fibromyalgia     GERD (gastroesophageal reflux disease)     HTN (hypertension)     Hypothyroidism          SURGICAL HISTORY       Past Surgical History:   Procedure Laterality Date    CHOLECYSTECTOMY      HYSTERECTOMY  07/28/2016    DR Denzel Luke THYROID SURGERY           CURRENT MEDICATIONS       Discharge Medication List as of 9/21/2020  1:44 AM      CONTINUE these medications which have NOT CHANGED    Details   pantoprazole (PROTONIX) 40 MG tablet Take 1 tablet by mouth every morning (before breakfast), Disp-90 tablet,R-1Normal      dicyclomine (BENTYL) 10 MG capsule Take 1 capsule by mouth 4 times daily (before meals and nightly), Disp-120 capsule,R-0Normal      ondansetron (ZOFRAN ODT) 4 MG disintegrating tablet Take 1 tablet by mouth every 8 hours as needed for Nausea or Vomiting, Disp-10 tablet,R-0Print      estradiol (VIVELLE) 0.05 MG/24HR APPLY 1 PATCH EXTERNALLY TO THE SKIN 2 TIMES A WEEK, Disp-8 patch,R-11Normal      meloxicam (MOBIC) 7.5 MG tablet Take 7.5 mg by mouth dailyHistorical Med      tiZANidine (ZANAFLEX) 4 MG tablet Take 1 tablet by mouth 3 times daily as needed (pain), Disp-30 tablet, R-0Print      acetaminophen (APAP EXTRA STRENGTH) 500 MG tablet Take 2 tablets by mouth every 6 hours as needed for Pain, Disp-30 tablet, R-0Print      insulin glargine (LANTUS) 100 UNIT/ML injection vial Inject 15 Units into the skin nightlyHistorical Med      atorvastatin (LIPITOR) 20 MG tablet Take 20 mg by mouth nightlyHistorical Med      lisinopril (PRINIVIL;ZESTRIL) 10 MG tablet Take 1 tablet by mouth daily DO NOT TAKE IF SYSTOLIC BP (TOP NUMBER) IS LESS THAN 100, Disp-30 tablet, R-2      metFORMIN, MOD, (GLUMETZA) 500 MG extended release tablet Take 1,000 mg by mouth 2 times daily (with meals)      levothyroxine (SYNTHROID) 150 MCG tablet Take 150 mcg by mouth every morning (before breakfast)      glipiZIDE (GLUCOTROL) 5 MG tablet Take 1 tablet by mouth Daily. , Disp-90 tablet, R-3      loratadine (CLARITIN) 10 MG tablet Take 1 tablet by mouth daily. , Disp-30 tablet, R-2      QUEtiapine (SEROQUEL) 100 MG tablet 1/2 TO 1 TAB Q HS, Disp-30 tablet, R-2      FLUoxetine (PROZAC) 20 MG capsule Take 1 capsule by mouth daily. , Disp-30 capsule, R-2             ALLERGIES     Patient has no known allergies. FAMILY HISTORY     History reviewed. No pertinent family history.        SOCIAL HISTORY       Social History     Socioeconomic History    Marital status: Single     Spouse name: None    Number of children: None    Years of education: None    Highest education level: None   Occupational History    None   Social Needs    Financial resource strain: None    Food insecurity     Worry: None     Inability: None    Transportation needs     Medical: None     Non-medical: None   Tobacco Use    Smoking status: Never Smoker    Smokeless tobacco: Never Used   Substance and Sexual Activity    Alcohol use: No     Alcohol/week: 0.0 standard drinks    Drug use: No    Sexual activity: None   Lifestyle    Physical activity     Days per week: None     Minutes per session: None    Stress: None   Relationships    Social connections     Talks on phone: None     Gets together: None     Attends Rastafari service: None     Active member of club or organization: None     Attends meetings of clubs or organizations: None     Relationship status: None    Intimate partner violence     Fear of current or ex partner: None     Emotionally abused: None     Physically abused: None     Forced sexual activity: None   Other Topics Concern    None   Social History Narrative    None       SCREENINGS             PHYSICAL EXAM    (up to 7 for level 4, 8 or more for level 5)     ED Triage Vitals [09/20/20 2345]   BP Temp Temp Source Pulse Resp SpO2 Height Weight   (!) 145/84 97.9 °F (36.6 °C) Oral 86 19 100 % 5' 3\" (1.6 m) 198 lb (89.8 kg)       Physical Exam  Vitals signs and nursing note reviewed. Constitutional:       General: She is not in acute distress. Appearance: Normal appearance. She is well-developed. She is not diaphoretic. HENT:      Head: Normocephalic and atraumatic. Eyes:      General: Lids are normal.      Conjunctiva/sclera: Conjunctivae normal.   Neck:      Musculoskeletal: Normal range of motion and neck supple. Cardiovascular:      Rate and Rhythm: Normal rate and regular rhythm. Pulses: Normal pulses. Heart sounds: Normal heart sounds. Pulmonary:      Effort: Pulmonary effort is normal.      Breath sounds: Normal breath sounds.    Abdominal:      General: Bowel sounds are normal. Palpations: Abdomen is soft. Tenderness: There is abdominal tenderness. Comments: Mild discomfort on palpation without guarding rebound or rigidity. Lymphadenopathy:      Cervical: No cervical adenopathy. Skin:     General: Skin is warm and dry. Capillary Refill: Capillary refill takes less than 2 seconds. Findings: No rash. Neurological:      Mental Status: She is alert and oriented to person, place, and time. Psychiatric:         Thought Content: Thought content normal.         Judgment: Judgment normal.           All other labs were within normal range or not returned as of this dictation. EMERGENCY DEPARTMENT COURSE and DIFFERENTIALDIAGNOSIS/MDM:   Vitals:    Vitals:    09/20/20 2345 09/21/20 0103 09/21/20 0156   BP: (!) 145/84 (!) 146/75 (!) 149/82   Pulse: 86 77 80   Resp: 19 20 16   Temp: 97.9 °F (36.6 °C)     TempSrc: Oral     SpO2: 100% 99% 97%   Weight: 198 lb (89.8 kg)     Height: 5' 3\" (1.6 m)              Patient is nontoxic no acute distress she is afebrile and hemodynamically stable. She does not have a gallbladder. Labs are grossly unremarkable. She had a CAT scan that shows large amount of stool identified scattered throughout the colon this is suggestive of constipation no abscess phlegmon or diverticulitis. She is nonacute abdomen on serial examinations. She was given magnesium citrate instructed to use over-the-counter stool softeners increase water and fiber intake and to follow-up with her GI physician in 2 days and to return to the ED for any new worsening or concerning symptoms. Patient verbalized stable for discharge. PROCEDURES:  Unless otherwise noted below, none     Procedures      FINAL IMPRESSION      1.  Constipation, unspecified constipation type          DISPOSITION/PLAN   DISPOSITION Decision To Discharge 09/21/2020 01:44:36 AM          Fidelia Nance (electronically signed)  Attending Emergency Physician       Izaiah Mark MERI  09/21/20 [id number]

## 2020-09-21 NOTE — ED NOTES
Patient awaiting CT scan, aware of need for urine sample, unable to provide sample.       Baylee Calderon RN  09/21/20 [id number]

## 2020-09-21 NOTE — ED TRIAGE NOTES
Patient to ED with c/o right sided flank pain and emesis since 5:30 pm today  Patient rates pain 10/10  No meds taken for pain  Skin warm and dry  resp equal and unlabored  Patient afebrile

## 2020-09-21 NOTE — ED NOTES
Returned patient from CT. Patient states she is able to void to provide sample. Ambulated patient to restroom without difficulty. Steady gait noted. Specimen obtained and sent to lab. Assisted patient back to bed, repositioned for comfort. No distress noted at this time. Lights dimmed for comfort.       Ginette Talbot RN  09/21/20 5817

## 2020-09-22 LAB
GFR AFRICAN AMERICAN: >60
GFR NON-AFRICAN AMERICAN: 58
PERFORMED ON: ABNORMAL
POC CREATININE: 1 MG/DL (ref 0.6–1.1)
POC SAMPLE TYPE: ABNORMAL

## 2020-09-22 RX ORDER — DICYCLOMINE HYDROCHLORIDE 10 MG/1
CAPSULE ORAL
Qty: 120 CAPSULE | Refills: 0 | Status: SHIPPED | OUTPATIENT
Start: 2020-09-22 | End: 2020-11-27 | Stop reason: ALTCHOICE

## 2020-09-23 ENCOUNTER — HOSPITAL ENCOUNTER (OUTPATIENT)
Age: 55
Setting detail: SPECIMEN
Discharge: HOME | End: 2020-09-23
Payer: MEDICAID

## 2020-09-23 ENCOUNTER — NURSE ONLY (OUTPATIENT)
Dept: PRIMARY CARE CLINIC | Age: 55
End: 2020-09-23

## 2020-09-23 PROCEDURE — U0003 INFECTIOUS AGENT DETECTION BY NUCLEIC ACID (DNA OR RNA); SEVERE ACUTE RESPIRATORY SYNDROME CORONAVIRUS 2 (SARS-COV-2) (CORONAVIRUS DISEASE [COVID-19]), AMPLIFIED PROBE TECHNIQUE, MAKING USE OF HIGH THROUGHPUT TECHNOLOGIES AS DESCRIBED BY CMS-2020-01-R: HCPCS

## 2020-09-25 LAB
SARS-COV-2: NOT DETECTED
SOURCE: NORMAL

## 2020-09-29 ENCOUNTER — HOSPITAL ENCOUNTER (OUTPATIENT)
Age: 55
Setting detail: OUTPATIENT SURGERY
Discharge: HOME | End: 2020-09-29
Attending: INTERNAL MEDICINE | Admitting: INTERNAL MEDICINE
Payer: MEDICAID

## 2020-09-29 ENCOUNTER — ANESTHESIA EVENT (OUTPATIENT)
Dept: ENDOSCOPY | Age: 55
End: 2020-09-29
Payer: MEDICAID

## 2020-09-29 ENCOUNTER — ANCILLARY PROCEDURE (OUTPATIENT)
Dept: ENDOSCOPY | Age: 55
End: 2020-09-29
Attending: INTERNAL MEDICINE
Payer: MEDICAID

## 2020-09-29 ENCOUNTER — ANESTHESIA (OUTPATIENT)
Dept: ENDOSCOPY | Age: 55
End: 2020-09-29
Payer: MEDICAID

## 2020-09-29 VITALS
OXYGEN SATURATION: 96 % | DIASTOLIC BLOOD PRESSURE: 76 MMHG | WEIGHT: 200 LBS | BODY MASS INDEX: 35.44 KG/M2 | HEIGHT: 63 IN | HEART RATE: 69 BPM | SYSTOLIC BLOOD PRESSURE: 154 MMHG | TEMPERATURE: 97.6 F | RESPIRATION RATE: 16 BRPM

## 2020-09-29 VITALS
OXYGEN SATURATION: 99 % | DIASTOLIC BLOOD PRESSURE: 70 MMHG | SYSTOLIC BLOOD PRESSURE: 128 MMHG | RESPIRATION RATE: 10 BRPM

## 2020-09-29 LAB
GLUCOSE BLD-MCNC: 225 MG/DL (ref 60–115)
PERFORMED ON: ABNORMAL

## 2020-09-29 PROCEDURE — 88342 IMHCHEM/IMCYTCHM 1ST ANTB: CPT

## 2020-09-29 PROCEDURE — 2709999900 HC NON-CHARGEABLE SUPPLY: Performed by: INTERNAL MEDICINE

## 2020-09-29 PROCEDURE — 43239 EGD BIOPSY SINGLE/MULTIPLE: CPT | Performed by: INTERNAL MEDICINE

## 2020-09-29 PROCEDURE — 3609027000 HC COLONOSCOPY: Performed by: INTERNAL MEDICINE

## 2020-09-29 PROCEDURE — 7100000011 HC PHASE II RECOVERY - ADDTL 15 MIN: Performed by: INTERNAL MEDICINE

## 2020-09-29 PROCEDURE — 2500000003 HC RX 250 WO HCPCS: Performed by: NURSE ANESTHETIST, CERTIFIED REGISTERED

## 2020-09-29 PROCEDURE — 3700000000 HC ANESTHESIA ATTENDED CARE: Performed by: INTERNAL MEDICINE

## 2020-09-29 PROCEDURE — 3700000001 HC ADD 15 MINUTES (ANESTHESIA): Performed by: INTERNAL MEDICINE

## 2020-09-29 PROCEDURE — 88305 TISSUE EXAM BY PATHOLOGIST: CPT

## 2020-09-29 PROCEDURE — 7100000010 HC PHASE II RECOVERY - FIRST 15 MIN: Performed by: INTERNAL MEDICINE

## 2020-09-29 PROCEDURE — 45378 DIAGNOSTIC COLONOSCOPY: CPT | Performed by: INTERNAL MEDICINE

## 2020-09-29 PROCEDURE — 6360000002 HC RX W HCPCS: Performed by: NURSE ANESTHETIST, CERTIFIED REGISTERED

## 2020-09-29 PROCEDURE — 2580000003 HC RX 258

## 2020-09-29 PROCEDURE — 3609017100 HC EGD: Performed by: INTERNAL MEDICINE

## 2020-09-29 PROCEDURE — 2580000003 HC RX 258: Performed by: INTERNAL MEDICINE

## 2020-09-29 PROCEDURE — 2580000003 HC RX 258: Performed by: NURSE ANESTHETIST, CERTIFIED REGISTERED

## 2020-09-29 RX ORDER — LIDOCAINE HYDROCHLORIDE 20 MG/ML
INJECTION, SOLUTION INFILTRATION; PERINEURAL PRN
Status: DISCONTINUED | OUTPATIENT
Start: 2020-09-29 | End: 2020-09-29 | Stop reason: SDUPTHER

## 2020-09-29 RX ORDER — SODIUM CHLORIDE 9 MG/ML
INJECTION, SOLUTION INTRAVENOUS CONTINUOUS PRN
Status: DISCONTINUED | OUTPATIENT
Start: 2020-09-29 | End: 2020-09-29 | Stop reason: SDUPTHER

## 2020-09-29 RX ORDER — MAGNESIUM HYDROXIDE 1200 MG/15ML
LIQUID ORAL PRN
Status: DISCONTINUED | OUTPATIENT
Start: 2020-09-29 | End: 2020-09-29 | Stop reason: ALTCHOICE

## 2020-09-29 RX ORDER — SODIUM CHLORIDE 9 MG/ML
INJECTION, SOLUTION INTRAVENOUS
Status: COMPLETED
Start: 2020-09-29 | End: 2020-09-29

## 2020-09-29 RX ORDER — 0.9 % SODIUM CHLORIDE 0.9 %
500 INTRAVENOUS SOLUTION INTRAVENOUS ONCE
Status: COMPLETED | OUTPATIENT
Start: 2020-09-29 | End: 2020-09-29

## 2020-09-29 RX ORDER — PROPOFOL 10 MG/ML
INJECTION, EMULSION INTRAVENOUS PRN
Status: DISCONTINUED | OUTPATIENT
Start: 2020-09-29 | End: 2020-09-29 | Stop reason: SDUPTHER

## 2020-09-29 RX ADMIN — SODIUM CHLORIDE: 9 INJECTION, SOLUTION INTRAVENOUS at 08:57

## 2020-09-29 RX ADMIN — LIDOCAINE HYDROCHLORIDE 20 MG: 20 INJECTION, SOLUTION INFILTRATION; PERINEURAL at 09:17

## 2020-09-29 RX ADMIN — PROPOFOL 470 MG: 10 INJECTION, EMULSION INTRAVENOUS at 09:17

## 2020-09-29 RX ADMIN — Medication 500 ML: at 08:54

## 2020-09-29 RX ADMIN — SODIUM CHLORIDE 500 ML: 9 INJECTION, SOLUTION INTRAVENOUS at 08:54

## 2020-09-29 ASSESSMENT — PULMONARY FUNCTION TESTS
PIF_VALUE: 0
PIF_VALUE: 1
PIF_VALUE: 0
PIF_VALUE: 1
PIF_VALUE: 0
PIF_VALUE: 0
PIF_VALUE: 1
PIF_VALUE: 1
PIF_VALUE: 0
PIF_VALUE: 1
PIF_VALUE: 0
PIF_VALUE: 0
PIF_VALUE: 1
PIF_VALUE: 0
PIF_VALUE: 0
PIF_VALUE: 1
PIF_VALUE: 1
PIF_VALUE: 0
PIF_VALUE: 1
PIF_VALUE: 0
PIF_VALUE: 1

## 2020-09-29 ASSESSMENT — PAIN - FUNCTIONAL ASSESSMENT: PAIN_FUNCTIONAL_ASSESSMENT: 0-10

## 2020-09-29 NOTE — ANESTHESIA POSTPROCEDURE EVALUATION
Department of Anesthesiology  Postprocedure Note    Patient: Jose Juan Stuart  MRN: [id number]  YOB: 1965  Date of evaluation: 9/29/2020  Time:  9:49 AM     Procedure Summary     Date:  09/29/20 Room / Location:  [address] [place] [address]    Anesthesia Start:  5931 Anesthesia Stop:      Procedures:       EGD ESOPHAGOGASTRODUODENOSCOPY with antral biopsies (N/A )      COLONOSCOPY DIAGNOSTIC (N/A ) Diagnosis:  (early satiety, dysphagia, GERD, family history of colon cancer in mother, rectal pain;  R68.81, R13.10, K21.9, Z80.0, K62.89)    Surgeon:  Dione Echevarria MD Responsible Provider:  FRANCA Montenegro CRNA    Anesthesia Type:  MAC ASA Status:  3          Anesthesia Type: MAC    Marcelle Phase I: Marcelle Score: 10    Marcelle Phase II:      Last vitals: Reviewed and per EMR flowsheets.        Anesthesia Post Evaluation    Patient location during evaluation: PACU  Patient participation: complete - patient participated  Level of consciousness: awake and alert  Pain score: 1  Airway patency: patent  Nausea & Vomiting: no nausea and no vomiting  Complications: no  Cardiovascular status: hemodynamically stable  Respiratory status: acceptable  Hydration status: euvolemic  Comments: Report to RN, normal sinus rhythm

## 2020-09-29 NOTE — ANESTHESIA PRE PROCEDURE
Department of Anesthesiology  Preprocedure Note       Name:  Indira Sandoval   Age:  48 y.o.  :  1965                                          MRN:  [id number]         Date:  2020      Surgeon: Trinity Phipps):  Tashi Godfrey MD    Procedure: Procedure(s):  EGD ESOPHAGOGASTRODUODENOSCOPY  COLONOSCOPY DIAGNOSTIC    Medications prior to admission:   Prior to Admission medications    Medication Sig Start Date End Date Taking? Authorizing Provider   pantoprazole (PROTONIX) 40 MG tablet Take 1 tablet by mouth every morning (before breakfast) 20  Yes Baby Pilling Luciano APRN - CNP   estradiol (VIVELLE) 0.05 MG/24HR APPLY 1 PATCH EXTERNALLY TO THE SKIN 2 TIMES A WEEK 3/10/20  Yes Darrell Warner MD   acetaminophen (APAP EXTRA STRENGTH) 500 MG tablet Take 2 tablets by mouth every 6 hours as needed for Pain 19  Yes Addison James APRN - CNP   atorvastatin (LIPITOR) 20 MG tablet Take 20 mg by mouth nightly   Yes Historical Provider, MD   lisinopril (PRINIVIL;ZESTRIL) 10 MG tablet Take 1 tablet by mouth daily DO NOT TAKE IF SYSTOLIC BP (TOP NUMBER) IS LESS THAN 100 16  Yes Ginette Talbot PA-C   metFORMIN, MOD, (GLUMETZA) 500 MG extended release tablet Take 1,000 mg by mouth 2 times daily (with meals) 10/26/16  Yes Historical Provider, MD   levothyroxine (SYNTHROID) 150 MCG tablet Take 150 mcg by mouth every morning (before breakfast) 10/26/16  Yes Historical Provider, MD   glipiZIDE (GLUCOTROL) 5 MG tablet Take 1 tablet by mouth Daily. Patient taking differently: Take 5 mg by mouth 2 times daily (before meals)  14  Yes Tomy KAT Holiday, DO   QUEtiapine (SEROQUEL) 100 MG tablet 1/2 TO 1 TAB Q HS  Patient taking differently: 1 TAB Q HS 13  Yes Tomy Cano, DO   FLUoxetine (PROZAC) 20 MG capsule Take 1 capsule by mouth daily.   Patient taking differently: Take 30 mg by mouth daily  13  Yes Tomy Cano, DO   dicyclomine (BENTYL) 10 MG capsule TAKE ONE CAPSULE BY MOUTH FOUR TIMES DAILY BEFORE MEALS AND NIGHTLY  Patient taking differently: Indications: have not used this  9/22/20   FRANCA Simpson - CNP   ondansetron (ZOFRAN ODT) 4 MG disintegrating tablet Take 1 tablet by mouth every 8 hours as needed for Nausea or Vomiting 8/13/20   Jan Clement MD   loratadine (CLARITIN) 10 MG tablet Take 1 tablet by mouth daily.  12/19/13   Tomy J Holiday, DO       Current medications:    Current Facility-Administered Medications   Medication Dose Route Frequency Provider Last Rate Last Dose    0.9 % sodium chloride bolus  500 mL Intravenous Once Layne Muir  mL/hr at 09/29/20 0854 500 mL at 09/29/20 0854       Allergies:  No Known Allergies    Problem List:    Patient Active Problem List   Diagnosis Code    HTN (hypertension) I10    Dyslipidemia E78.5    DM (diabetes mellitus) ([facility] [address] E11.9    Hypothyroidism E03.9    GERD (gastroesophageal reflux disease) K21.9    Arthritis M19.90    Fibromyalgia M79.7    Bipolar disorder ([facility] [address] F31.9    Depression F32.9    Bipolar 2 disorder, major depressive episode ([facility] [address] F31.81    Precordial pain R07.2       Past Medical History:        Diagnosis Date    Arthritis     Bipolar disorder ([facility] [address]     DM (diabetes mellitus) ([facility] [address]     Dyslipidemia     Fibromyalgia     GERD (gastroesophageal reflux disease)     HTN (hypertension)     Hypothyroidism        Past Surgical History:        Procedure Laterality Date    CHOLECYSTECTOMY      HYSTERECTOMY  07/28/2016    DR Christa Dinero THYROID SURGERY         Social History:    Social History     Tobacco Use    Smoking status: Never Smoker    Smokeless tobacco: Never Used   Substance Use Topics    Alcohol use: No     Alcohol/week: 0.0 standard drinks                                Counseling given: Not Answered      Vital Signs (Current):   Vitals:    09/29/20 0834   BP: (!) 183/85   Pulse: 76   Resp: 16   Temp: 36.4 °C (97.6 °F)   TempSrc: Temporal   SpO2: 99%   Weight: 200 lb (90.7 kg)   Height: 5' 3\" (1.6 m)                                              BP Readings from Last 3 Encounters:   09/29/20 (!) 183/85   09/21/20 (!) 149/82   08/13/20 (!) 142/80       NPO Status: Time of last liquid consumption: 2330                        Time of last solid consumption: 1700                        Date of last liquid consumption: 09/28/20                        Date of last solid food consumption: 09/25/20    BMI:   Wt Readings from Last 3 Encounters:   09/29/20 200 lb (90.7 kg)   09/20/20 198 lb (89.8 kg)   08/13/20 200 lb (90.7 kg)     Body mass index is 35.43 kg/m².     CBC:   Lab Results   Component Value Date    WBC 7.8 09/21/2020    RBC 4.85 09/21/2020    HGB 14.0 09/21/2020    HCT 42.5 09/21/2020    MCV 87.6 09/21/2020    RDW 14.5 09/21/2020     09/21/2020       CMP:   Lab Results   Component Value Date     09/21/2020    K 4.2 09/21/2020    K 4.0 08/13/2020    CL 99 09/21/2020    CO2 25 09/21/2020    BUN 16 09/21/2020    CREATININE 1.0 09/21/2020    CREATININE 1.08 09/21/2020    GFRAA >60 09/21/2020    LABGLOM 58 09/21/2020    GLUCOSE 231 09/21/2020    PROT 6.6 09/21/2020    CALCIUM 8.7 09/21/2020    BILITOT <0.2 09/21/2020    ALKPHOS 107 09/21/2020    AST 75 09/21/2020    ALT 53 09/21/2020       POC Tests:   Recent Labs     09/29/20  0837   POCGLU 225*       Coags:   Lab Results   Component Value Date    PROTIME 13.2 07/30/2020    INR 1.0 07/30/2020    APTT 29.0 07/30/2020       HCG (If Applicable):   Lab Results   Component Value Date    PREGTESTUR Negative 10/28/2016        ABGs: No results found for: PHART, PO2ART, WHJ3JRR, EUV9UET, BEART, L1EVIRDL     Type & Screen (If Applicable):  No results found for: LABABO, LABRH    Drug/Infectious Status (If Applicable):  No results found for: HIV, HEPCAB    COVID-19 Screening (If Applicable):   Lab Results   Component Value Date    COVID19 Not Detected 09/23/2020         Anesthesia Evaluation  Patient summary reviewed and Nursing notes reviewed  Airway: Mallampati: III  TM distance: >3 FB   Neck ROM: full  Mouth opening: > = 3 FB Dental: normal exam         Pulmonary:normal exam                               Cardiovascular:  Exercise tolerance: no interval change,   (+) hypertension: no interval change, hyperlipidemia        Rhythm: regular  Rate: normal                    Neuro/Psych:   (+) psychiatric history: stable without treatmentdepression/anxiety             GI/Hepatic/Renal:   (+) GERD: no interval change,           Endo/Other:    (+) Diabetes, hypothyroidism: arthritis:., .                  ROS comment: fibromyalgia Abdominal:   (+) obese,         Vascular:                                        Anesthesia Plan      MAC     ASA 3             Anesthetic plan and risks discussed with patient. Plan discussed with attending.                   Madelyn Mota, FRANCA - CRNA   9/29/2020

## 2020-09-29 NOTE — H&P
Patient Name: Nawaf Hutchison  : 1965  MRN: [id number]  DATE: 20      ENDOSCOPY  History and Physical    Procedure:    [] Diagnostic Colonoscopy       [x] Screening Colonoscopy  [x] EGD      [] ERCP      [] EUS       [] Other    [x] Previous office notes/History and Physical reviewed from the patients chart. Please see EMR for further details of HPI. I have examined the patient's status immediately prior to the procedure and:      Indications/HPI:    []Abdominal Pain   []Cancer- GI/Lung  [x]Fhx of colon CA  []History of Polyps   []Booths   []Melena  []Abnormal Imaging   []Dysphagia    []Persistent Pneumonia  []Anemia   []Food Impaction  []History of Polyps  []GI Bleed   []Pulmonary nodule/Mass  []Change in bowel habits  [x]Heartburn/Reflux  []Rectal Bleed (BRBPR)  []Chest Pain - Non Cardiac  []Heme (+) Stool  []Ulcers  []Constipation   []Hemoptysis   []Varices  []Diarrhea   []Hypoxemia  []Nausea/Vomiting   []Screening   []Crohns/Colitis  []Other:    Anesthesia:   [x] MAC [] Moderate Sedation   [] General   [] None     ROS: 12 pt Review of Symptoms was negative unless mentioned above    Medications:   Prior to Admission medications    Medication Sig Start Date End Date Taking?  Authorizing Provider   pantoprazole (PROTONIX) 40 MG tablet Take 1 tablet by mouth every morning (before breakfast) 20  Yes Kalie Wolf, APRN - CNP   estradiol (VIVELLE) 0.05 MG/24HR APPLY 1 PATCH EXTERNALLY TO THE SKIN 2 TIMES A WEEK 3/10/20  Yes Darrell Warner MD   acetaminophen (APAP EXTRA STRENGTH) 500 MG tablet Take 2 tablets by mouth every 6 hours as needed for Pain 19  Yes Fernando James, APRN - CNP   atorvastatin (LIPITOR) 20 MG tablet Take 20 mg by mouth nightly   Yes Historical Provider, MD   lisinopril (PRINIVIL;ZESTRIL) 10 MG tablet Take 1 tablet by mouth daily DO NOT TAKE IF SYSTOLIC BP (TOP NUMBER) IS LESS THAN 100 16  Yes Bushra Victoria PA-C   metFORMIN, MOD, (GLUMETZA) 500 MG extended BP: (!) 143/72   Pulse: 73   Resp: 16   Temp:    SpO2: 99%        Physical Exam:  Cardiac:  [x]WNL []Comments:  Pulmonary:  [x]WNL   []Comments:   Neuro/Mental Status:  [x]WNL  []Comments:  Abdominal:  [x]WNL    []Comments:  Other:   []WNL  []Comments:    Informed Consent:  The risks and benefits of the procedure have been discussed with either the patient or if they cannot consent, their representative. Assessment:  Patient examined and appropriate for planned sedation and procedure. Plan:  Proceed with planned sedation and procedure as above. The patient was counseled at length about risks of lul COVID-19 in the perioperative and any recovery window from the procedure. The patient was made aware that lul COVID-19 may worsen their prognosis for recovery from their procedure and lend to a higher morbidity and-all mortality risk. The patient was given the option of postponing the procedure all material risks, benefits, and alternatives were discussed. The patient does wish to proceed with the procedure at this time.     Leonard Jovel MD  9:51 AM

## 2020-10-01 ENCOUNTER — TELEPHONE (OUTPATIENT)
Dept: GASTROENTEROLOGY | Age: 55
End: 2020-10-01

## 2020-10-06 ENCOUNTER — OFFICE VISIT (OUTPATIENT)
Dept: GASTROENTEROLOGY | Age: 55
End: 2020-10-06
Payer: MEDICAID

## 2020-10-06 VITALS — OXYGEN SATURATION: 100 % | WEIGHT: 200 LBS | HEIGHT: 63 IN | BODY MASS INDEX: 35.44 KG/M2 | HEART RATE: 95 BPM

## 2020-10-06 PROCEDURE — 99213 OFFICE O/P EST LOW 20 MIN: CPT | Performed by: NURSE PRACTITIONER

## 2020-10-06 PROCEDURE — G8427 DOCREV CUR MEDS BY ELIG CLIN: HCPCS | Performed by: NURSE PRACTITIONER

## 2020-10-06 PROCEDURE — G8417 CALC BMI ABV UP PARAM F/U: HCPCS | Performed by: NURSE PRACTITIONER

## 2020-10-06 PROCEDURE — G8484 FLU IMMUNIZE NO ADMIN: HCPCS | Performed by: NURSE PRACTITIONER

## 2020-10-06 PROCEDURE — 3017F COLORECTAL CA SCREEN DOC REV: CPT | Performed by: NURSE PRACTITIONER

## 2020-10-06 PROCEDURE — 1036F TOBACCO NON-USER: CPT | Performed by: NURSE PRACTITIONER

## 2020-10-06 RX ORDER — POLYETHYLENE GLYCOL 3350 17 G/17G
17 POWDER, FOR SOLUTION ORAL DAILY
Qty: 1530 G | Refills: 1 | Status: SHIPPED | OUTPATIENT
Start: 2020-10-06 | End: 2020-11-05

## 2020-10-06 RX ORDER — OMEPRAZOLE 40 MG/1
40 CAPSULE, DELAYED RELEASE ORAL DAILY
Qty: 30 CAPSULE | Refills: 3 | Status: SHIPPED | OUTPATIENT
Start: 2020-10-06 | End: 2021-05-11

## 2020-10-06 RX ORDER — FAMOTIDINE 20 MG/1
20 TABLET, FILM COATED ORAL 2 TIMES DAILY
Qty: 60 TABLET | Refills: 3 | Status: SHIPPED | OUTPATIENT
Start: 2020-10-06 | End: 2021-05-11

## 2020-10-06 NOTE — PROGRESS NOTES
Gastroenterology Clinic Follow up Visit    Laura Vicente  [id number]  Chief Complaint   Patient presents with    Follow-up     Patient c/o slight stomach pain and nausea. Occasional emesis     Nausea    Emesis     HPI: Last seen in GI clinic on 9/15/20 with family hx of colon cancer, early satiety, CARBAJAL, dysphagia, rectal pain, GERD . Interval change:   BM: has BM 3 times per week. Stool is soft and pasty. Blanca Bienenstock in color. +straining. +upper abd pain. +nausea, occasional emesis-food. +Diabetic -takes oral medication. +GERD. Takes PPI with subopitmal response. Early satiety. No dysphagia. Has been making diet changes. Reports a 5 pound intentional weight loss. No chest pain, shortness of breath, or palpitations. No alcohol use or NSAID use. Previous GI work up/Endoscopic investigations:   Colonoscopy  9/20-sigmoid diverticulosis. External hemorrhoids. Otherwise a normal exam.  Surveillance colonoscopy due 2025 due to family history of colon cancer  EGD 9/20- patchy antral gastritis. Irregular Z line. Histology negative for HP. Review of Systems   All other systems reviewed and are negative. Past medical history, past surgical history, medication list, social and familyhistory reviewed    Pulse 95, height 5' 3\" (1.6 m), weight 200 lb (90.7 kg), last menstrual period 03/27/2016, SpO2 100 %, not currently breastfeeding. Physical Exam  Vitals signs reviewed. Constitutional:       General: She is not in acute distress. Appearance: She is well-developed. She is not diaphoretic. HENT:      Head: Normocephalic and atraumatic. Eyes:      General: No scleral icterus. Conjunctiva/sclera: Conjunctivae normal.      Pupils: Pupils are equal, round, and reactive to light. Neck:      Musculoskeletal: Normal range of motion and neck supple. Cardiovascular:      Rate and Rhythm: Normal rate and regular rhythm. Heart sounds: Normal heart sounds. No murmur.    Pulmonary: Effort: Pulmonary effort is normal. No respiratory distress. Breath sounds: Normal breath sounds. No wheezing or rales. Chest:      Chest wall: No tenderness. Abdominal:      General: Bowel sounds are normal. There is no distension. Palpations: Abdomen is soft. There is no mass. Tenderness: There is no abdominal tenderness. There is no guarding or rebound. Comments: Central obesity   Musculoskeletal: Normal range of motion. Right lower leg: No edema. Left lower leg: No edema. Lymphadenopathy:      Cervical: No cervical adenopathy. Skin:     General: Skin is warm and dry. Coloration: Skin is not jaundiced or pale. Findings: No bruising, erythema or rash. Neurological:      Mental Status: She is alert and oriented to person, place, and time. Psychiatric:         Behavior: Behavior normal.         Thought Content: Thought content normal.         Judgment: Judgment normal.         Laboratory, Pathology, Radiology reviewed in detail with relevantimportant investigations summarized below:    Recent Labs     09/21/20  0015   WBC 7.8   HGB 14.0   HCT 42.5   MCV 87.6        Lab Results   Component Value Date    ALT 53 (H) 09/21/2020    AST 75 (H) 09/21/2020    ALKPHOS 107 09/21/2020    BILITOT <0.2 09/21/2020     Assessment and Plan:  Ethan Ramirez 48 y.o. female status post EGD and colonoscopy. EGD with findings of an irregular Z line and patchy antral gastritis, negative for HP. Patient with nausea and early satiety.  -Antireflux lifestyle strongly recommended, examples provided. Daily PPI 15 to 30 minutes before breakfast, PPI changed to omeprazole. Add Pepcid as needed. Colonoscopy findings of sigmoid diverticulosis and external hemorrhoids. Patient with clinical history suggestive of CIC. Once constipation has improved nausea and early satiety should resolve. -Recommend a daily bowel regimen:  -MiraLAX 17 g daily.   Titrate dose as needed to have 1-2 soft formed stools per day  -Add Metamucil 1 to 2 teaspoons daily. Adequate fluid throughout the day and activity recommended    Return in about 2 months (around 12/6/2020). FRANCA Howard - [facility]    Please note this report has been partially produced using speech recognitionsoftware  and may cause contain errors related to that system including grammar, punctuation and spelling as well as words andphrases that may seem inappropriate. If there are questions or concerns please feel free to contact me to clarify.

## 2020-10-07 ENCOUNTER — TELEPHONE (OUTPATIENT)
Dept: GASTROENTEROLOGY | Age: 55
End: 2020-10-07

## 2020-10-12 ENCOUNTER — INITIAL CONSULT (OUTPATIENT)
Dept: INTERVENTIONAL RADIOLOGY/VASCULAR | Age: 55
End: 2020-10-12
Payer: MEDICAID

## 2020-10-12 VITALS
SYSTOLIC BLOOD PRESSURE: 154 MMHG | WEIGHT: 200 LBS | BODY MASS INDEX: 35.43 KG/M2 | DIASTOLIC BLOOD PRESSURE: 76 MMHG | HEART RATE: 95 BPM

## 2020-10-12 PROBLEM — M54.50 LOW BACK PAIN: Status: ACTIVE | Noted: 2018-07-17

## 2020-10-12 PROBLEM — M19.011 OSTEOARTHRITIS OF RIGHT SHOULDER: Status: ACTIVE | Noted: 2020-04-16

## 2020-10-12 PROBLEM — K76.0 FATTY LIVER: Status: ACTIVE | Noted: 2020-08-19

## 2020-10-12 PROBLEM — Z88.6 ALLERGY STATUS TO ANALGESIC AGENT STATUS: Status: ACTIVE | Noted: 2018-02-15

## 2020-10-12 PROBLEM — E11.9 TYPE 2 DIABETES MELLITUS WITHOUT COMPLICATION, WITHOUT LONG-TERM CURRENT USE OF INSULIN (HCC): Status: ACTIVE | Noted: 2018-10-18

## 2020-10-12 PROBLEM — I10 ESSENTIAL HYPERTENSION: Status: ACTIVE | Noted: 2018-07-17

## 2020-10-12 PROBLEM — K59.09 CHRONIC CONSTIPATION: Status: ACTIVE | Noted: 2020-10-08

## 2020-10-12 PROCEDURE — G8417 CALC BMI ABV UP PARAM F/U: HCPCS | Performed by: NURSE PRACTITIONER

## 2020-10-12 PROCEDURE — 99203 OFFICE O/P NEW LOW 30 MIN: CPT | Performed by: NURSE PRACTITIONER

## 2020-10-12 PROCEDURE — G8484 FLU IMMUNIZE NO ADMIN: HCPCS | Performed by: NURSE PRACTITIONER

## 2020-10-12 PROCEDURE — G8427 DOCREV CUR MEDS BY ELIG CLIN: HCPCS | Performed by: NURSE PRACTITIONER

## 2020-10-12 RX ORDER — NORTRIPTYLINE HYDROCHLORIDE 50 MG/1
50 CAPSULE ORAL NIGHTLY
COMMUNITY
Start: 2020-10-08

## 2020-10-12 RX ORDER — FLUTICASONE PROPIONATE 50 MCG
SPRAY, SUSPENSION (ML) NASAL
COMMUNITY
Start: 2020-10-09

## 2020-10-12 RX ORDER — CETIRIZINE HYDROCHLORIDE 10 MG/1
TABLET ORAL
COMMUNITY
Start: 2020-10-09

## 2020-10-12 RX ORDER — FLUOXETINE HYDROCHLORIDE 40 MG/1
CAPSULE ORAL
COMMUNITY
Start: 2020-10-07

## 2020-10-12 RX ORDER — DIVALPROEX SODIUM 500 MG/1
TABLET, EXTENDED RELEASE ORAL
COMMUNITY
Start: 2020-10-09

## 2020-10-12 RX ORDER — BACLOFEN 5 MG/1
TABLET ORAL
COMMUNITY
Start: 2020-10-09 | End: 2022-09-29

## 2020-10-12 RX ORDER — CELECOXIB 100 MG/1
100 CAPSULE ORAL 2 TIMES DAILY PRN
COMMUNITY
Start: 2020-10-08 | End: 2020-11-27 | Stop reason: ALTCHOICE

## 2020-10-12 ASSESSMENT — ENCOUNTER SYMPTOMS
SHORTNESS OF BREATH: 0
EYE ITCHING: 0
WHEEZING: 0
SORE THROAT: 0
DIARRHEA: 0
COUGH: 0
EYE REDNESS: 0
TROUBLE SWALLOWING: 0
VOMITING: 0
EYE PAIN: 0
NAUSEA: 0
SINUS PAIN: 0
CONSTIPATION: 1
SINUS PRESSURE: 0
ABDOMINAL PAIN: 1
EYE DISCHARGE: 0

## 2020-10-12 NOTE — PROGRESS NOTES
Johnny Alfaro, a female of 48 y.o. came to the office 10/12/2020. Chief Complaint   Patient presents with    Consultation     liver bx       HPI: Johnny Alfaro presents to clinic for consultation at the request of GI requesting Ct Guided Percutaneous Random Biopsy of Liver for CARBAJAL. Symptoms include: Tenderness to right side when lying down. Onset 3 months. Patient is Korean-speaking. [facility]  used via iPad      No family history on file.     Past Surgical History:   Procedure Laterality Date    CHOLECYSTECTOMY      COLONOSCOPY N/A 9/29/2020    COLONOSCOPY DIAGNOSTIC performed by Leonard Jovel MD at [place] [id number]  07/28/2016    DR Chang [facility] THYROID SURGERY      UPPER GASTROINTESTINAL ENDOSCOPY N/A 9/29/2020    EGD ESOPHAGOGASTRODUODENOSCOPY with antral biopsies performed by Leonard [facility]vel MD [facility]        Past Medical History:   Diagnosis Date    Arthritis     Bipolar disorder ([facility] [address]     DM (diabetes mellitus) ([facility] [address]     Dyslipidemia     Fibromyalgia     GERD (gastroesophageal reflux disease)     HTN (hypertension)     Hypothyroidism        Social History     Socioeconomic History    Marital status: Single     Spouse name: Not on file    Number of children: Not on file    Years of education: Not on file    Highest education level: Not on file   Occupational History    Not on file   Social Needs    Financial resource strain: Not on file    Food insecurity     Worry: Not on file     Inability: Not on file    Transportation needs     Medical: Not on file     Non-medical: Not on file   Tobacco Use    Smoking status: Never Smoker    Smokeless tobacco: Never Used   Substance and Sexual Activity    Alcohol use: No     Alcohol/week: 0.0 standard drinks    Drug use: No    Sexual activity: Not on file   Lifestyle    Physical activity     Days per week: Not on file     Minutes per session: Not on file    Stress: Not on file   Relationships    Social connections     Talks on phone: Not on file     Gets together: Not on file     Attends [employer] service: Not on file     Active member of club or organization: Not on file     Attends meetings of clubs or organizations: Not on file     Relationship status: Not on file    Intimate partner violence     Fear of current or ex partner: Not on file     Emotionally abused: Not on file     Physically abused: Not on file     Forced sexual activity: Not on file   Other Topics Concern    Not on file   Social History Narrative    Not on file       No Known Allergies    Current Outpatient Medications on File Prior to Visit   Medication Sig Dispense Refill    omeprazole (PRILOSEC) 40 MG delayed release capsule Take 1 capsule by mouth daily 30 capsule 3    famotidine (PEPCID) 20 MG tablet Take 1 tablet by mouth 2 times daily 60 tablet 3    polyethylene glycol (GLYCOLAX) 17 GM/SCOOP powder Take 17 g by mouth daily 1530 g 1    psyllium (METAMUCIL) 58.6 % powder Take by mouth 3 times daily.  1 Bottle 5    dicyclomine (BENTYL) 10 MG capsule TAKE ONE CAPSULE BY MOUTH FOUR TIMES DAILY BEFORE MEALS AND NIGHTLY (Patient taking differently: Indications: have not used this ) 120 capsule 0    ondansetron (ZOFRAN ODT) 4 MG disintegrating tablet Take 1 tablet by mouth every 8 hours as needed for Nausea or Vomiting 10 tablet 0    estradiol (VIVELLE) 0.05 MG/24HR APPLY 1 PATCH EXTERNALLY TO THE SKIN 2 TIMES A WEEK 8 patch 11    acetaminophen (APAP EXTRA STRENGTH) 500 MG tablet Take 2 tablets by mouth every 6 hours as needed for Pain 30 tablet 0    atorvastatin (LIPITOR) 20 MG tablet Take 20 mg by mouth nightly      lisinopril (PRINIVIL;ZESTRIL) 10 MG tablet Take 1 tablet by mouth daily DO NOT TAKE IF SYSTOLIC BP (TOP NUMBER) IS LESS THAN 100 30 tablet 2    metFORMIN, MOD, (GLUMETZA) 500 MG extended release tablet Take 1,000 mg by mouth 2 times daily (with meals)      levothyroxine (SYNTHROID) 150 MCG tablet Take 150 mcg by mouth every morning (before breakfast)      glipiZIDE (GLUCOTROL) 5 MG tablet Take 1 tablet by mouth Daily. (Patient taking differently: Take 5 mg by mouth 2 times daily (before meals) ) 90 tablet 3    loratadine (CLARITIN) 10 MG tablet Take 1 tablet by mouth daily. 30 tablet 2    QUEtiapine (SEROQUEL) 100 MG tablet 1/2 TO 1 TAB Q HS (Patient taking differently: 1 TAB Q HS) 30 tablet 2    FLUoxetine (PROZAC) 20 MG capsule Take 1 capsule by mouth daily. (Patient taking differently: Take 30 mg by mouth daily ) 30 capsule 2     No current facility-administered medications on file prior to visit. Review of Systems   Constitutional: Positive for unexpected weight change (unintentional). Negative for activity change, appetite change (fair), chills, fatigue and fever. HENT: Negative for congestion, sinus pressure, sinus pain, sore throat and trouble swallowing. Eyes: Negative for pain, discharge, redness and itching. Respiratory: Negative for cough, shortness of breath and wheezing. Cardiovascular: Negative for chest pain, palpitations and leg swelling. Gastrointestinal: Positive for abdominal pain (right side when lying down on right) and constipation. Negative for diarrhea, nausea and vomiting. Endocrine: Negative for cold intolerance and heat intolerance. Genitourinary: Negative. Negative for difficulty urinating, frequency, hematuria and urgency. Musculoskeletal: Negative for gait problem and neck stiffness. Skin: Negative for rash and wound. Neurological: Negative. Negative for dizziness, light-headedness and headaches. Hematological: Negative. Psychiatric/Behavioral: Negative. OBJECTIVE:  BP (!) 154/76   Pulse 95   Wt 200 lb (90.7 kg)   LMP 03/27/2016 (Exact Date)   BMI 35.43 kg/m²     Physical Exam  Vitals signs reviewed. Constitutional:       General: She is not in acute distress. Appearance: Normal appearance. She is not ill-appearing.    HENT:      Head: Normocephalic and atraumatic. Nose: No congestion. Neck:      Musculoskeletal: Normal range of motion. Cardiovascular:      Rate and Rhythm: Regular rhythm. Heart sounds: Normal heart sounds. Pulmonary:      Effort: Pulmonary effort is normal.      Breath sounds: Normal breath sounds. Abdominal:      General: Bowel sounds are normal.      Palpations: Abdomen is soft. Musculoskeletal: Normal range of motion. Right lower leg: No edema. Left lower leg: No edema. Skin:     General: Skin is warm and dry. Capillary Refill: Capillary refill takes 2 to 3 seconds. Neurological:      Mental Status: She is alert and oriented to person, place, and time. Psychiatric:         Mood and Affect: Mood normal.         Behavior: Behavior normal.       ASSESSMENT AND PLAN:    Assessment:   1. CARBAJAL. GI requesting diagnostic biopsy    Plan:   1. Percutaneous Random Needle Biopsy of Liver. Procedure with risks including infection, bleeding, pain at site, inconclusive results, and death though rare. She wishes to proceed. Medications and lab work reviewed. Discussed with patient medications to hold and take prior to and after biopsy. See media for patient instruction form and structure and form for radiology nurses scheduling  2. Follow up with GI for pathology results. Thank You GWEN Wolf CNP for referral of your patient to our practice for care. More than 50% of the visit done in counseling and/or coordination of care.     FRANCA Roque CNP

## 2020-10-14 ENCOUNTER — TELEPHONE (OUTPATIENT)
Dept: INTERVENTIONAL RADIOLOGY/VASCULAR | Age: 55
End: 2020-10-14

## 2020-10-14 NOTE — TELEPHONE ENCOUNTER
* Chloé Sanders called patient because she is Kiswahili speaking  - advised pt we had to move procedure from 10/21/20 @ 9:00 am to 10/28/20 @ 9:00 am  PATIENT WAS GIVEN THIS INFORMATION VIA PHONE CONVERSATION - VOICED UNDERSTANDING  >  YOUR PROCEDURE IS SCHEDULED ON: 10/28/20 @ 9:00 am  >  You will need to arrive at 8:00 am from home and check in at the [facility] Desk located through the Outpatient Entrance before your scheduled time of the procedure.   >  Do not eat or drink after midnight. Sips of water is acceptable ONLY to take medications but do not take blood thinners or other medications you were advised not to take.  >  Make arrangements for transportation, as you should not drive immediately after.

## 2020-10-23 ENCOUNTER — PREP FOR PROCEDURE (OUTPATIENT)
Dept: INTERVENTIONAL RADIOLOGY/VASCULAR | Age: 55
End: 2020-10-23

## 2020-10-23 RX ORDER — FENTANYL CITRATE 50 UG/ML
50 INJECTION, SOLUTION INTRAMUSCULAR; INTRAVENOUS
Status: CANCELLED | OUTPATIENT
Start: 2020-10-23

## 2020-10-23 RX ORDER — IBUPROFEN 200 MG
TABLET ORAL ONCE
Status: CANCELLED | OUTPATIENT
Start: 2020-10-23 | End: 2020-10-23

## 2020-10-23 RX ORDER — 0.9 % SODIUM CHLORIDE 0.9 %
250 INTRAVENOUS SOLUTION INTRAVENOUS
Status: CANCELLED | OUTPATIENT
Start: 2020-10-23

## 2020-10-23 RX ORDER — MIDAZOLAM HYDROCHLORIDE 1 MG/ML
0.5 INJECTION INTRAMUSCULAR; INTRAVENOUS
Status: CANCELLED | OUTPATIENT
Start: 2020-10-23

## 2020-10-23 RX ORDER — LIDOCAINE HYDROCHLORIDE 20 MG/ML
10 INJECTION, SOLUTION INFILTRATION; PERINEURAL
Status: CANCELLED | OUTPATIENT
Start: 2020-10-23

## 2020-10-28 ENCOUNTER — HOSPITAL ENCOUNTER (OUTPATIENT)
Dept: CT IMAGING | Age: 55
Discharge: HOME | End: 2020-10-30
Payer: MEDICAID

## 2020-10-28 VITALS
RESPIRATION RATE: 16 BRPM | OXYGEN SATURATION: 95 % | SYSTOLIC BLOOD PRESSURE: 146 MMHG | HEART RATE: 65 BPM | DIASTOLIC BLOOD PRESSURE: 70 MMHG | TEMPERATURE: 98.3 F

## 2020-10-28 LAB
GLUCOSE BLD-MCNC: 146 MG/DL (ref 60–115)
HCT VFR BLD CALC: 41 % (ref 37–47)
HEMOGLOBIN: 13.5 G/DL (ref 12–16)
INR BLD: 0.9
MCH RBC QN AUTO: 28.8 PG (ref 27–31.3)
MCHC RBC AUTO-ENTMCNC: 32.9 % (ref 33–37)
MCV RBC AUTO: 87.6 FL (ref 82–100)
PDW BLD-RTO: 14.4 % (ref 11.5–14.5)
PERFORMED ON: ABNORMAL
PLATELET # BLD: 219 K/UL (ref 130–400)
PROTHROMBIN TIME: 12.6 SEC (ref 12.3–14.9)
RBC # BLD: 4.69 M/UL (ref 4.2–5.4)
WBC # BLD: 7 K/UL (ref 4.8–10.8)

## 2020-10-28 PROCEDURE — 2580000003 HC RX 258: Performed by: RADIOLOGY

## 2020-10-28 PROCEDURE — 47000 NEEDLE BIOPSY OF LIVER PERQ: CPT

## 2020-10-28 PROCEDURE — 88313 SPECIAL STAINS GROUP 2: CPT

## 2020-10-28 PROCEDURE — 88307 TISSUE EXAM BY PATHOLOGIST: CPT

## 2020-10-28 PROCEDURE — 85610 PROTHROMBIN TIME: CPT

## 2020-10-28 PROCEDURE — 6370000000 HC RX 637 (ALT 250 FOR IP): Performed by: RADIOLOGY

## 2020-10-28 PROCEDURE — 77012 CT SCAN FOR NEEDLE BIOPSY: CPT

## 2020-10-28 PROCEDURE — 6360000002 HC RX W HCPCS: Performed by: RADIOLOGY

## 2020-10-28 PROCEDURE — 85027 COMPLETE CBC AUTOMATED: CPT

## 2020-10-28 RX ORDER — MIDAZOLAM HYDROCHLORIDE 1 MG/ML
INJECTION INTRAMUSCULAR; INTRAVENOUS
Status: COMPLETED | OUTPATIENT
Start: 2020-10-28 | End: 2020-10-28

## 2020-10-28 RX ORDER — LIDOCAINE HYDROCHLORIDE 20 MG/ML
10 INJECTION, SOLUTION INFILTRATION; PERINEURAL
Status: DISCONTINUED | OUTPATIENT
Start: 2020-10-28 | End: 2020-10-31 | Stop reason: HOSPADM

## 2020-10-28 RX ORDER — FENTANYL CITRATE 50 UG/ML
INJECTION, SOLUTION INTRAMUSCULAR; INTRAVENOUS
Status: COMPLETED | OUTPATIENT
Start: 2020-10-28 | End: 2020-10-28

## 2020-10-28 RX ORDER — SODIUM CHLORIDE 9 MG/ML
INJECTION, SOLUTION INTRAVENOUS CONTINUOUS PRN
Status: COMPLETED | OUTPATIENT
Start: 2020-10-28 | End: 2020-10-28

## 2020-10-28 RX ORDER — 0.9 % SODIUM CHLORIDE 0.9 %
250 INTRAVENOUS SOLUTION INTRAVENOUS
Status: DISCONTINUED | OUTPATIENT
Start: 2020-10-28 | End: 2020-10-31 | Stop reason: HOSPADM

## 2020-10-28 RX ORDER — BACITRACIN, NEOMYCIN, POLYMYXIN B 400; 3.5; 5 [USP'U]/G; MG/G; [USP'U]/G
OINTMENT TOPICAL
Status: COMPLETED | OUTPATIENT
Start: 2020-10-28 | End: 2020-10-28

## 2020-10-28 RX ORDER — MIDAZOLAM HYDROCHLORIDE 1 MG/ML
0.5 INJECTION INTRAMUSCULAR; INTRAVENOUS
Status: DISCONTINUED | OUTPATIENT
Start: 2020-10-28 | End: 2020-10-31 | Stop reason: HOSPADM

## 2020-10-28 RX ORDER — BACITRACIN, NEOMYCIN, POLYMYXIN B 400; 3.5; 5 [USP'U]/G; MG/G; [USP'U]/G
OINTMENT TOPICAL ONCE
Status: DISCONTINUED | OUTPATIENT
Start: 2020-10-28 | End: 2020-10-31 | Stop reason: HOSPADM

## 2020-10-28 RX ORDER — FENTANYL CITRATE 50 UG/ML
50 INJECTION, SOLUTION INTRAMUSCULAR; INTRAVENOUS
Status: DISCONTINUED | OUTPATIENT
Start: 2020-10-28 | End: 2020-10-31 | Stop reason: HOSPADM

## 2020-10-28 RX ADMIN — BACITRACIN ZINC, NEOMYCIN SULFATE, POLYMYXIN B SULFATE 1 EACH: 3.5; 5000; 4 OINTMENT TOPICAL at 11:15

## 2020-10-28 RX ADMIN — SODIUM CHLORIDE 250 ML: 9 INJECTION, SOLUTION INTRAVENOUS at 10:55

## 2020-10-28 RX ADMIN — MIDAZOLAM HYDROCHLORIDE 1 MG: 2 INJECTION, SOLUTION INTRAMUSCULAR; INTRAVENOUS at 10:58

## 2020-10-28 RX ADMIN — MIDAZOLAM HYDROCHLORIDE 1 MG: 2 INJECTION, SOLUTION INTRAMUSCULAR; INTRAVENOUS at 11:07

## 2020-10-28 RX ADMIN — FENTANYL CITRATE 50 MCG: 50 INJECTION, SOLUTION INTRAMUSCULAR; INTRAVENOUS at 11:07

## 2020-10-28 RX ADMIN — FENTANYL CITRATE 50 MCG: 50 INJECTION, SOLUTION INTRAMUSCULAR; INTRAVENOUS at 10:59

## 2020-10-28 ASSESSMENT — PAIN - FUNCTIONAL ASSESSMENT: PAIN_FUNCTIONAL_ASSESSMENT: 0-10

## 2020-10-28 NOTE — PROGRESS NOTES
0830 Pt ambulatory to CT holding room. 0835 VSS. NSR on the monitor. 1420 [facility]  used to translate #[id number]. Pt denies any pain. Allergies, history and medications reviewed. Procedure briefly explained. Pt has been NPO since yesterday at 1800. Blood sugar checked this am at home at was 106.     0900 Dr Jammie Biswas ordered blood work. Blood sample obtained by CS-RN and taken to lab.     0930 Pt comfortable resting on the cart. Awaiting blood work results. 1000 Dr Jammie Biswas aware that blood work is done. Dr irving come ane see the pt.    1020 Dr Jammie Biswas at bedside. Dr Jammie Biswas explained the procedure to the pt and assessed the pt.  #[id number] used to translate. Mildly to Moderately Impaired: difficulty hearing in some environments or speaker may need to increase volume or speak distinctly

## 2020-10-28 NOTE — FLOWSHEET NOTE
Patient now back in the CT holding room for observation. Band-aid CD&I. She denies pain. VSS. 1132 One touch done result 146. Snack offered. Head of the cart up to 60 degrees for comfort. 1142 Patient friend Eliane Branch updated on the plan of care. We will contact him closer to the time of discharge. 200 Dr Drea Palacios here now to see the patient. She continues to deny pain. Band-aid CD&I. VSS.      1317 Patient resting quietly with eyes closed on her right side. When aroused denies pain. Written discharge  instructions set at the bedside for her to review. IV removed jelco intact per patient's request.      0541 Dr Drea Palacios here now explained the plan of care for going home though an interpretor # [id number]. Patient asked few questions and stated that she was comfortable. Assisted to get up from the cart and taken to the bathroom to void without difficulty. Able to get dressed without complications and then stated she was having 2/10pain with movement. Support offered and an ice bag was given to use while we awaited her ride home. 1600 Patient was able to ambulate in steady gait to get into her friend Melissas SUV to drive home. Support offered.

## 2020-10-28 NOTE — PRE SEDATION
Sedation Pre-Procedure Note    Patient Name: Sherin Meraz   YOB: 1965  Room/Bed: Room/bed info not found  Medical Record Number: [id number]  Date: 10/28/2020   Time: 10:44 AM       Indication:  **Moderate Sedation*    Consent: I have discussed with the patient and/or the patient representative the indication, alternatives, and the possible risks and/or complications of the planned procedure and the anesthesia methods. The patient and/or patient representative appear to understand and agree to proceed. Vital Signs:   Vitals:    10/28/20 0835   BP: 138/75   Pulse: 69   Resp: 18   Temp: 98.3 °F (36.8 °C)   SpO2: 97%       Past Medical History:   has a past medical history of Arthritis, Bipolar disorder ([facility] [address] DM (diabetes mellitus) ([facility] [address] Dyslipidemia, Fibromyalgia, GERD (gastroesophageal reflux disease), HTN (hypertension), and Hypothyroidism. Past Surgical History:   has a past surgical history that includes Hysterectomy (07/28/2016); Cholecystectomy; Thyroid surgery; Upper gastrointestinal endoscopy (N/A, 9/29/2020); and Colonoscopy (N/A, 9/29/2020). Medications:   Scheduled Meds:    neomycin-bacitracin-polymyxin   Topical Once     Continuous Infusions:   PRN Meds: sodium chloride, lidocaine, fentanNYL, midazolam  Home Meds:   Prior to Admission medications    Medication Sig Start Date End Date Taking?  Authorizing Provider   cetirizine (ZYRTEC) 10 MG tablet  10/9/20  Yes Historical Provider, MD   divalproex (DEPAKOTE ER) 500 MG extended release tablet  10/9/20  Yes Historical Provider, MD   omeprazole (PRILOSEC) 40 MG delayed release capsule Take 1 capsule by mouth daily 10/6/20  Yes FRANCA Rosas CNP   famotidine (PEPCID) 20 MG tablet Take 1 tablet by mouth 2 times daily 10/6/20  Yes FRANCA Rosas CNP   polyethylene glycol [facility]) 17 GM/SCOOP powder Take 17 g by mouth daily 10/6/20 11/5/20 Yes FRANCA Rosas CNP   psyllium (METAMUCIL) 58.6 % powder Take by mouth 3 times daily. 10/6/20  Yes FRANCA Anderson CNP   dicyclomine (BENTYL) 10 MG capsule TAKE ONE CAPSULE BY MOUTH FOUR TIMES DAILY BEFORE MEALS AND NIGHTLY  Patient taking differently: Indications: have not used this  9/22/20  Yes FRANCA Anderson CNP   atorvastatin (LIPITOR) 20 MG tablet Take 20 mg by mouth nightly   Yes Historical Provider, MD   lisinopril (PRINIVIL;ZESTRIL) 10 MG tablet Take 1 tablet by mouth daily DO NOT TAKE IF SYSTOLIC BP (TOP NUMBER) IS LESS THAN 100 11/2/16  Yes Ricarda Brunner, PA-C   metFORMIN, MOD, (GLUMETZA) 500 MG extended release tablet Take 1,000 mg by mouth 2 times daily (with meals) 10/26/16  Yes Historical Provider, MD   levothyroxine (SYNTHROID) 150 MCG tablet Take 150 mcg by mouth every morning (before breakfast) 10/26/16  Yes Historical Provider, MD   glipiZIDE (GLUCOTROL) 5 MG tablet Take 1 tablet by mouth Daily. Patient taking differently: Take 5 mg by mouth 2 times daily (before meals)  11/25/14  Yes Tomy Cano, DO   QUEtiapine (SEROQUEL) 100 MG tablet 1/2 TO 1 TAB Q HS  Patient taking differently: 1 TAB Q HS 12/19/13  Yes Tomy Cano, DO   FLUoxetine (PROZAC) 20 MG capsule Take 1 capsule by mouth daily.   Patient taking differently: Take 30 mg by mouth daily  12/19/13  Yes Tomy Cano, DO   fluticasone (FLONASE) 50 MCG/ACT nasal spray  10/9/20   Historical Provider, MD   FLUoxetine (PROZAC) 40 MG capsule  10/7/20   Historical Provider, MD   celecoxib (CELEBREX) 100 MG capsule Take 100 mg by mouth 2 times daily as needed 10/8/20   Historical Provider, MD   Baclofen (LIORESAL) 5 MG tablet  10/9/20   Historical Provider, MD   nortriptyline (PAMELOR) 50 MG capsule Take 50 mg by mouth nightly 10/8/20   Historical Provider, MD   ondansetron (ZOFRAN ODT) 4 MG disintegrating tablet Take 1 tablet by mouth every 8 hours as needed for Nausea or Vomiting 8/13/20   Alejandra Carlisle MD   estradiol (VIVELLE) 0.05 MG/24HR APPLY 1 PATCH EXTERNALLY TO THE SKIN 2 TIMES A WEEK 3/10/20   Darrell Warner MD   acetaminophen (APAP EXTRA STRENGTH) 500 MG tablet Take 2 tablets by mouth every 6 hours as needed for Pain 2/18/19   FRANCA Naylor - CNP   loratadine (CLARITIN) 10 MG tablet Take 1 tablet by mouth daily. 12/19/13   Tomy J Holiday, DO     Coumadin Use Last 7 Days:  no  Antiplatelet drug therapy use last 7 days: no  Other anticoagulant use last 7 days: no  Additional Medication Information:  na      Pre-Sedation Documentation and Exam:   Vital signs have been reviewed (see flow sheet for vitals).     Mallampati Airway Assessment:  normal    Prior History of Anesthesia Complications:   none    ASA Classification:  Class 2 - A normal healthy patient with mild systemic disease    Sedation/ Anesthesia Plan:   intravenous sedation    Medications Planned:   midazolam (Versed) intravenously and FENTANYL    Patient is an appropriate candidate for plan of sedation: yes    Electronically signed by Sherrod Dandy, MD on 10/28/2020 at 10:44 AM

## 2020-10-28 NOTE — SEDATION DOCUMENTATION
SEDATION ADMINISTERED    Patient positioned supine on CT table. Vitals Monitor applied. VSS. 1053 CT scans done with arms up. 1056 Timeout completed. 1058 Versed 1 mg IV and Fentanyl 50 mcg IV sedation administered. 1102 Patient offered verbal and tactile support, vitals stable. 1104 Dr Marce Ho  reviewed scans, middle slight rightt quadrant of the abdomen site marked, prepped with Chloraprep and draped with sterile drape and towels. 1105 Right hand held per Crystal the nurse while skin numbed with Lidocaine 2%,6 ml  by Dr Marce Ho. 1107 Patient complains of pain additional 1 mg of versed and 50 mcg of fentanyl given slow IVP. 1109 Oxygen saturation noted to drop and oxygen started at 2 liters per NC.    1110 CT Fluoro guidance used to place 17 g x 7 cm introducer. 1111 Bard Max-core  core biopsy needle  18 x 10cm used to obtain a total of 4 samples. 3 Samples placed into formulin. One sample in saline. Verbal and tactile reassurance provided throughout. 1115 Introducer withdrawn, pressure held then neosporin and bandaid applied. Patient tolerated well. 1118 Paitent tolerated well. VSS. Alert and cooperative denies dolor. 1120 Specimen taken to lab. Patient taken to CT Holding Room for Recovery.     Total Sedation Given:  Versed:     2 mg       Fentanyl:    100 mcg

## 2020-11-16 ENCOUNTER — VIRTUAL VISIT (OUTPATIENT)
Dept: GASTROENTEROLOGY | Age: 55
End: 2020-11-16
Payer: MEDICAID

## 2020-11-16 PROCEDURE — 99213 OFFICE O/P EST LOW 20 MIN: CPT | Performed by: NURSE PRACTITIONER

## 2020-11-16 NOTE — PROGRESS NOTES
2020    TELEHEALTH EVALUATION -- Audio/Visual (During AHJTC-05 public health emergency)    Due to Mk 19 outbreak, patient's office visit was converted to a virtual visit. Patient was contacted and agreed to proceed with a virtual visit via Telephone Visit 15 minutes. The risks and benefits of converting to a virtual visit were discussed in light of the current infectious disease epidemic. Patient also understood that insurance coverage and co-pays are up to their individual insurance plans. HPI:  Juma Sanders (:  1965) has requested an audio/video evaluation for the following concern(s):  Patient following up to discuss liver biopsy results. Patient denies abdominal pain, nausea or vomiting. Constipation has completely resolved with daily bowel regimen consisting of MiraLAX and fiber. No melena or hematochezia. Currently no weight loss or loss of appetite. No NSAID use, alcohol or recreational drug use. FibroScan was performed in  with low probability of fibrosis stage IV. Slightly elevated transaminases. Patient wanted to proceed with liver biopsy for definitive diagnosis. She  denies chest pain, shortness of breath, or palpitations. She denies jaundice. She denies any syncopal episodes. Previous GI work up/Endoscopic investigations:   Liver biopsy 10/28/2020-MILD CHRONIC HEPATITIS WITH MACROVESICULAR STEATOSIS   THE HISTOLOGIC FINDINGS ARE CONSISTENT WITH THE CLINICAL   IMPRESSION OF CARBAJAL. NAFLD ACTIVITY SCORE AND FIBROSIS STAGING: STEATOSIS SCORE 2, LOBULAR   INFLAMMATION SCORE 2, HEPATOCYTE BALLOONING SCORE 1, FIBROSIS STAGE 1A. Colonoscopy  -sigmoid diverticulosis. External hemorrhoids. Otherwise a normal exam.  Surveillance colonoscopy due  due to family history of colon cancer  EGD - patchy antral gastritis. Irregular Z line. Histology negative for HP. Review of Systems   All other systems reviewed and are negative.       Prior to Visit Medications    Medication Sig Taking? Authorizing Provider   fluticasone (FLONASE) 50 MCG/ACT nasal spray   Historical Provider, MD   cetirizine (ZYRTEC) 10 MG tablet   Historical Provider, MD   FLUoxetine (PROZAC) 40 MG capsule   Historical Provider, MD   celecoxib (CELEBREX) 100 MG capsule Take 100 mg by mouth 2 times daily as needed  Historical Provider, MD   divalproex (DEPAKOTE ER) 500 MG extended release tablet   Historical Provider, MD   Baclofen (LIORESAL) 5 MG tablet   Historical Provider, MD   nortriptyline (PAMELOR) 50 MG capsule Take 50 mg by mouth nightly  Historical Provider, MD   omeprazole (PRILOSEC) 40 MG delayed release capsule Take 1 capsule by mouth daily  FRANCA Fox CNP   famotidine (PEPCID) 20 MG tablet Take 1 tablet by mouth 2 times daily  FRANCA Jay CNP   psyllium (METAMUCIL) 58.6 % powder Take by mouth 3 times daily.   FRANCA Fox CNP   dicyclomine (BENTYL) 10 MG capsule TAKE ONE CAPSULE BY MOUTH FOUR TIMES DAILY BEFORE MEALS AND NIGHTLY  Patient taking differently: Indications: have not used this   FRANCA Fox CNP   ondansetron (ZOFRAN ODT) 4 MG disintegrating tablet Take 1 tablet by mouth every 8 hours as needed for Nausea or Vomiting  Kim Sellers MD   estradiol (VIVELLE) 0.05 MG/24HR APPLY 1 PATCH EXTERNALLY TO THE SKIN 2 TIMES A WEEK  Darrell Warner MD   acetaminophen (APAP EXTRA STRENGTH) 500 MG tablet Take 2 tablets by mouth every 6 hours as needed for Pain  Adjondi Amparo DemetriaFRANCA CNP   atorvastatin (LIPITOR) 20 MG tablet Take 20 mg by mouth nightly  Historical Provider, MD   lisinopril (PRINIVIL;ZESTRIL) 10 MG tablet Take 1 tablet by mouth daily DO NOT TAKE IF SYSTOLIC BP (TOP NUMBER) IS LESS THAN 100  Diane Escalante PA-C   metFORMIN, MOD, (GLUMETZA) 500 MG extended release tablet Take 1,000 mg by mouth 2 times daily (with meals)  Historical Provider, MD   levothyroxine (SYNTHROID) 150 MCG tablet Take 150 mcg by mouth every morning (before breakfast)  Historical Provider, MD   glipiZIDE (GLUCOTROL) 5 MG tablet Take 1 tablet by mouth Daily. Patient taking differently: Take 5 mg by mouth 2 times daily (before meals)   Tomy Cano DO   loratadine (CLARITIN) 10 MG tablet Take 1 tablet by mouth daily. Tomy Cano DO   QUEtiapine (SEROQUEL) 100 MG tablet 1/2 TO 1 TAB Q HS  Patient taking differently: 1 TAB Q HS  Tomy Cano DO   FLUoxetine (PROZAC) 20 MG capsule Take 1 capsule by mouth daily. Patient taking differently: Take 30 mg by mouth daily   Tomy Cano DO       Social History     Tobacco Use    Smoking status: Never Smoker    Smokeless tobacco: Never Used   Substance Use Topics    Alcohol use: No     Alcohol/week: 0.0 standard drinks    Drug use: No        No Known Allergies,   Past Medical History:   Diagnosis Date    Arthritis     [facility] ([facility] [address]     DM (diabetes mellitus) ([facility] [address]     Dyslipidemia     Fibromyalgia     GERD (gastroesophageal reflux disease)     HTN (hypertension)     Hypothyroidism    ,   Past Surgical History:   Procedure Laterality Date    CHOLECYSTECTOMY      COLONOSCOPY N/A 9/29/2020    COLONOSCOPY DIAGNOSTIC performed by Parish Jackson MD at [address] [place] [address] NEEDLE BIOPSY LIVER PERCUTANEOUS  10/28/2020    CT NEEDLE BIOPSY LIVER PERCUTANEOUS [place] [facility] [place]    HYSTERECTOMY  07/28/2016    DR [facility]    THYROID SURGERY      UPPER GASTROINTESTINAL ENDOSCOPY N/A 9/29/2020    EGD ESOPHAGOGASTRODUODENOSCOPY with antral biopsies performed by Parish Jackson MD at [facility]   ,   Social History     Tobacco Use    Smoking status: Never Smoker    Smokeless tobacco: Never Used   Substance Use Topics    Alcohol use: No     Alcohol/week: 0.0 standard drinks    Drug use: No   , No family history on file.     Due to this being a TeleHealth encounter, evaluation of the following organ systems is limited: Vitals/Constitutional/EENT/Resp/CV/GI//MS/Neuro/Skin/Heme-Lymph-Imm. ASSESSMENT/PLAN:  1. CARBAJAL (nonalcoholic steatohepatitis)  Status post liver biopsy for confirmatory purposes. Due to low probability of FibroScan reporting fibrosis stage IV. Liver biopsy reports Jenny Pounds. Will recheck LFTs. Discussed with the patient natural history of Jenny Pounds, therapeutic modalities and weight loss program.  Mentioned that as low as 3.5% of weight loss will be associated with improvement of steatosis. 5% with inflammation improvement, and 7.5% weight loss will be associated with regression of fibrosis. Continue control of associated medical conditions in the terms of diabetes and dyslipidemia. Multiple diets have been studied but more recent data supports Mediterranean diet. Patient deferred referral to nutrition. Patient encouraged to avoid any hepatotoxic agents, examples provided. - Hepatic Function Panel    2. Chronic idiopathic constipation  -Good response to MiraLAX and fiber. Patient encouraged to continue current bowel regimen, along with adequate fluids and activity. Return in about 6 months (around 5/16/2021). An  electronic signature was used to authenticate this note. --FRANCA Aiken - CNP on 11/16/2020 at 2:50 PM      Pursuant to the emergency declaration under the [address] [facility] [address] waiver authority and the [employer] Act, this Virtual  Visit was conducted, with patient's consent, to reduce the patient's risk of exposure to COVID-19 and provide continuity of care for an established patient. Services were provided through a video synchronous discussion virtually to substitute for in-person clinic visit.

## 2020-11-26 ENCOUNTER — APPOINTMENT (OUTPATIENT)
Dept: CT IMAGING | Age: 55
End: 2020-11-26
Payer: MEDICAID

## 2020-11-26 ENCOUNTER — HOSPITAL ENCOUNTER (EMERGENCY)
Age: 55
Discharge: HOME | End: 2020-11-27
Payer: MEDICAID

## 2020-11-26 LAB
ALBUMIN SERPL-MCNC: 4.3 G/DL (ref 3.5–4.6)
ALP BLD-CCNC: 104 U/L (ref 40–130)
ALT SERPL-CCNC: 39 U/L (ref 0–33)
ANION GAP SERPL CALCULATED.3IONS-SCNC: 11 MEQ/L (ref 9–15)
AST SERPL-CCNC: 41 U/L (ref 0–35)
BASOPHILS ABSOLUTE: 0.1 K/UL (ref 0–0.2)
BASOPHILS RELATIVE PERCENT: 1.2 %
BILIRUB SERPL-MCNC: 0.3 MG/DL (ref 0.2–0.7)
BILIRUBIN URINE: NEGATIVE
BLOOD, URINE: NEGATIVE
BUN BLDV-MCNC: 11 MG/DL (ref 6–20)
CALCIUM SERPL-MCNC: 9.5 MG/DL (ref 8.5–9.9)
CHLORIDE BLD-SCNC: 98 MEQ/L (ref 95–107)
CLARITY: CLEAR
CO2: 28 MEQ/L (ref 20–31)
COLOR: YELLOW
CREAT SERPL-MCNC: 0.83 MG/DL (ref 0.5–0.9)
EOSINOPHILS ABSOLUTE: 0.2 K/UL (ref 0–0.7)
EOSINOPHILS RELATIVE PERCENT: 2.3 %
GFR AFRICAN AMERICAN: >60
GFR NON-AFRICAN AMERICAN: >60
GLOBULIN: 3.1 G/DL (ref 2.3–3.5)
GLUCOSE BLD-MCNC: 156 MG/DL (ref 70–99)
GLUCOSE URINE: NEGATIVE MG/DL
HCT VFR BLD CALC: 43 % (ref 37–47)
HEMOGLOBIN: 14.2 G/DL (ref 12–16)
KETONES, URINE: ABNORMAL MG/DL
LEUKOCYTE ESTERASE, URINE: NEGATIVE
LIPASE: 49 U/L (ref 12–95)
LYMPHOCYTES ABSOLUTE: 3.6 K/UL (ref 1–4.8)
LYMPHOCYTES RELATIVE PERCENT: 37.8 %
MCH RBC QN AUTO: 29.1 PG (ref 27–31.3)
MCHC RBC AUTO-ENTMCNC: 33 % (ref 33–37)
MCV RBC AUTO: 88.1 FL (ref 82–100)
MONOCYTES ABSOLUTE: 0.8 K/UL (ref 0.2–0.8)
MONOCYTES RELATIVE PERCENT: 8.1 %
NEUTROPHILS ABSOLUTE: 4.8 K/UL (ref 1.4–6.5)
NEUTROPHILS RELATIVE PERCENT: 50.6 %
NITRITE, URINE: NEGATIVE
PDW BLD-RTO: 14.5 % (ref 11.5–14.5)
PH UA: 5.5 (ref 5–9)
PLATELET # BLD: 255 K/UL (ref 130–400)
POC CREATININE WHOLE BLOOD: 0.8
POTASSIUM SERPL-SCNC: 3.7 MEQ/L (ref 3.4–4.9)
PROTEIN UA: NEGATIVE MG/DL
RBC # BLD: 4.89 M/UL (ref 4.2–5.4)
SODIUM BLD-SCNC: 137 MEQ/L (ref 135–144)
SPECIFIC GRAVITY UA: 1.02 (ref 1–1.03)
TOTAL PROTEIN: 7.4 G/DL (ref 6.3–8)
URINE REFLEX TO CULTURE: ABNORMAL
UROBILINOGEN, URINE: 0.2 E.U./DL
WBC # BLD: 9.6 K/UL (ref 4.8–10.8)

## 2020-11-26 PROCEDURE — 81003 URINALYSIS AUTO W/O SCOPE: CPT

## 2020-11-26 PROCEDURE — 83690 ASSAY OF LIPASE: CPT

## 2020-11-26 PROCEDURE — 85025 COMPLETE CBC W/AUTO DIFF WBC: CPT

## 2020-11-26 PROCEDURE — 2580000003 HC RX 258: Performed by: PHYSICIAN ASSISTANT

## 2020-11-26 PROCEDURE — 80053 COMPREHEN METABOLIC PANEL: CPT

## 2020-11-26 PROCEDURE — 36415 COLL VENOUS BLD VENIPUNCTURE: CPT

## 2020-11-26 PROCEDURE — 99283 EMERGENCY DEPT VISIT LOW MDM: CPT

## 2020-11-26 PROCEDURE — 96375 TX/PRO/DX INJ NEW DRUG ADDON: CPT

## 2020-11-26 PROCEDURE — 74177 CT ABD & PELVIS W/CONTRAST: CPT

## 2020-11-26 PROCEDURE — 6360000004 HC RX CONTRAST MEDICATION: Performed by: PHYSICIAN ASSISTANT

## 2020-11-26 PROCEDURE — 6360000002 HC RX W HCPCS: Performed by: PHYSICIAN ASSISTANT

## 2020-11-26 PROCEDURE — 96374 THER/PROPH/DIAG INJ IV PUSH: CPT

## 2020-11-26 RX ORDER — 0.9 % SODIUM CHLORIDE 0.9 %
500 INTRAVENOUS SOLUTION INTRAVENOUS ONCE
Status: COMPLETED | OUTPATIENT
Start: 2020-11-26 | End: 2020-11-26

## 2020-11-26 RX ORDER — DICYCLOMINE HYDROCHLORIDE 10 MG/1
20 CAPSULE ORAL ONCE
Status: COMPLETED | OUTPATIENT
Start: 2020-11-27 | End: 2020-11-27

## 2020-11-26 RX ORDER — KETOROLAC TROMETHAMINE 15 MG/ML
15 INJECTION, SOLUTION INTRAMUSCULAR; INTRAVENOUS ONCE
Status: COMPLETED | OUTPATIENT
Start: 2020-11-27 | End: 2020-11-27

## 2020-11-26 RX ORDER — FENTANYL CITRATE 50 UG/ML
25 INJECTION, SOLUTION INTRAMUSCULAR; INTRAVENOUS ONCE
Status: COMPLETED | OUTPATIENT
Start: 2020-11-26 | End: 2020-11-26

## 2020-11-26 RX ORDER — ONDANSETRON 2 MG/ML
4 INJECTION INTRAMUSCULAR; INTRAVENOUS ONCE
Status: COMPLETED | OUTPATIENT
Start: 2020-11-26 | End: 2020-11-26

## 2020-11-26 RX ADMIN — ONDANSETRON 4 MG: 2 INJECTION INTRAMUSCULAR; INTRAVENOUS at 22:27

## 2020-11-26 RX ADMIN — FENTANYL CITRATE 25 MCG: 50 INJECTION INTRAMUSCULAR; INTRAVENOUS at 22:28

## 2020-11-26 RX ADMIN — SODIUM CHLORIDE 500 ML: 9 INJECTION, SOLUTION INTRAVENOUS at 22:27

## 2020-11-26 RX ADMIN — IOPAMIDOL 100 ML: 612 INJECTION, SOLUTION INTRAVENOUS at 23:25

## 2020-11-26 ASSESSMENT — PAIN SCALES - GENERAL
PAINLEVEL_OUTOF10: 8
PAINLEVEL_OUTOF10: 9

## 2020-11-26 ASSESSMENT — PAIN DESCRIPTION - PAIN TYPE: TYPE: ACUTE PAIN

## 2020-11-26 ASSESSMENT — PAIN DESCRIPTION - LOCATION: LOCATION: ABDOMEN

## 2020-11-26 ASSESSMENT — PAIN DESCRIPTION - ORIENTATION: ORIENTATION: RIGHT

## 2020-11-27 VITALS
WEIGHT: 165 LBS | BODY MASS INDEX: 30.36 KG/M2 | DIASTOLIC BLOOD PRESSURE: 75 MMHG | RESPIRATION RATE: 16 BRPM | OXYGEN SATURATION: 99 % | SYSTOLIC BLOOD PRESSURE: 155 MMHG | HEIGHT: 62 IN | HEART RATE: 95 BPM | TEMPERATURE: 98.3 F

## 2020-11-27 PROCEDURE — 6360000002 HC RX W HCPCS: Performed by: PHYSICIAN ASSISTANT

## 2020-11-27 PROCEDURE — 6370000000 HC RX 637 (ALT 250 FOR IP): Performed by: PHYSICIAN ASSISTANT

## 2020-11-27 RX ORDER — MELOXICAM 7.5 MG/1
7.5 TABLET ORAL DAILY
Qty: 7 TABLET | Refills: 0 | Status: SHIPPED | OUTPATIENT
Start: 2020-11-27 | End: 2021-06-14

## 2020-11-27 RX ORDER — HYOSCYAMINE SULFATE 0.12 MG/1
0.12 TABLET SUBLINGUAL EVERY 4 HOURS PRN
Qty: 20 EACH | Refills: 0 | Status: SHIPPED | OUTPATIENT
Start: 2020-11-27 | End: 2021-07-14

## 2020-11-27 RX ORDER — ONDANSETRON 4 MG/1
4 TABLET, FILM COATED ORAL EVERY 8 HOURS PRN
Qty: 20 TABLET | Refills: 0 | Status: SHIPPED | OUTPATIENT
Start: 2020-11-27 | End: 2021-08-23 | Stop reason: SDUPTHER

## 2020-11-27 RX ADMIN — KETOROLAC TROMETHAMINE 15 MG: 15 INJECTION, SOLUTION INTRAMUSCULAR; INTRAVENOUS at 00:05

## 2020-11-27 RX ADMIN — DICYCLOMINE HYDROCHLORIDE 20 MG: 10 CAPSULE ORAL at 00:05

## 2020-11-27 ASSESSMENT — PAIN SCALES - GENERAL: PAINLEVEL_OUTOF10: 8

## 2020-11-27 ASSESSMENT — ENCOUNTER SYMPTOMS
APNEA: 0
PHOTOPHOBIA: 0
BACK PAIN: 0
ABDOMINAL DISTENTION: 0
NAUSEA: 1
EYE DISCHARGE: 0
ABDOMINAL PAIN: 1
BLOOD IN STOOL: 0
VOMITING: 1
COUGH: 0
ANAL BLEEDING: 0
VOICE CHANGE: 0

## 2020-11-27 NOTE — ED PROVIDER NOTES
[address] [facility] ED  eMERGENCY dEPARTMENT eNCOUnter      Pt Name: Demarcus Win  MRN: [id number]  [place] 1965  Date of evaluation: 11/26/2020  Provider: Frannie Xie PA-C    CHIEF COMPLAINT       Chief Complaint   Patient presents with    Abdominal Pain     right sided abd pain         HISTORY OF PRESENT ILLNESS   (Location/Symptom, Timing/Onset,Context/Setting, Quality, Duration, Modifying Factors, Severity)  Note limiting factors. Demarcus Win is a 48 y.o. female who presents to the emergency department presents with right-sided abdominal pain. Started at 2 PM today. Patient notes nausea vomiting. Had a last bowel movement in emergency room. Patient does have history of chronic constipation. Patient denies rectal bleeding denies tar-like stools denies bleeding with vomiting denies back pain denies pain burning frequent urination. Symptoms mild to moderate severity worse with touch or motion. Nothing improves symptoms. [employer]obi[employer]  227017 used for translatin    HPI    NursingNotes were reviewed. REVIEW OF SYSTEMS    (2-9 systems for level 4, 10 or more for level 5)     Review of Systems   Constitutional: Negative for activity change, appetite change, fever and unexpected weight change. HENT: Negative for ear discharge, ear pain, nosebleeds and voice change. Eyes: Negative for photophobia and discharge. Respiratory: Negative for apnea and cough. Cardiovascular: Negative for chest pain. Gastrointestinal: Positive for abdominal pain, nausea and vomiting. Negative for abdominal distention, anal bleeding and blood in stool. Endocrine: Negative for cold intolerance, heat intolerance and polyphagia. Genitourinary: Positive for flank pain. Negative for dysuria, genital sores and hematuria. Musculoskeletal: Negative for back pain, joint swelling, neck pain and neck stiffness. Skin: Negative for pallor. Allergic/Immunologic: Negative for immunocompromised state. Neurological: Negative for seizures and facial asymmetry. Hematological: Does not bruise/bleed easily. Psychiatric/Behavioral: Negative for behavioral problems, self-injury and sleep disturbance. All other systems reviewed and are negative. Except as noted above the remainder of the review of systems was reviewed and negative. PAST MEDICAL HISTORY     Past Medical History:   Diagnosis Date    Arthritis     B[facility] disorder ([facility]     DM (diabetes mellitus) ([facility]     Dyslipidemia     Fibromyalgia     GERD (gastroesophageal reflux disease)     HTN (hypertension)     Hypothyroidism          SURGICALHISTORY       Past Surgical History:   Procedure Laterality Date    CHOLECYSTECTOMY      COLONOSCOPY N/A 9/29/2020    COLONOSCOPY DIAGNOSTIC performed by Andra Welch MD at [address] [facility] NEEDLE BIOPSY LIVER PERCUTANEOUS  10/28/2020    CT NEEDLE BIOPSY LIVER PERCUTANE[place] [facility] [place]    HYSTERECTOMY  07/28/2016     [facility]    THYROID SURGERY      UPPER GASTROINTESTINAL ENDOSCOPY N/A 9/29/2020    EGD ESOPHAGOGASTRODUODENOSCOPY with antral biopsies performed by Andra Welch MD at [facility]       Previous Medications    ACETAMINOPHEN (APAP EXTRA STRENGTH) 500 MG TABLET    Take 2 tablets by mouth every 6 hours as needed for Pain    ATORVASTATIN (LIPITOR) 20 MG TABLET    Take 20 mg by mouth nightly    BACLOFEN (LIORESAL) 5 MG TABLET        CETIRIZINE (ZYRTEC) 10 MG TABLET        DIVALPROEX (DEPAKOTE ER) 500 MG EXTENDED RELEASE TABLET        ESTRADIOL (VIVELLE) 0.05 MG/24HR    APPLY 1 PATCH EXTERNALLY TO THE SKIN 2 TIMES A WEEK    FAMOTIDINE (PEPCID) 20 MG TABLET    Take 1 tablet by mouth 2 times daily    FLUOXETINE (PROZAC) 20 MG CAPSULE    Take 1 capsule by mouth daily. FLUOXETINE (PROZAC) 40 MG CAPSULE        FLUTICASONE (FLONASE) 50 MCG/ACT NASAL SPRAY        GLIPIZIDE (GLUCOTROL) 5 MG TABLET    Take 1 tablet by mouth Daily. LEVOTHYROXINE (SYNTHROID) 150 MCG TABLET    Take 150 mcg by mouth every morning (before breakfast)    LISINOPRIL (PRINIVIL;ZESTRIL) 10 MG TABLET    Take 1 tablet by mouth daily DO NOT TAKE IF SYSTOLIC BP (TOP NUMBER) IS LESS THAN 100    LORATADINE (CLARITIN) 10 MG TABLET    Take 1 tablet by mouth daily. METFORMIN, MOD, (GLUMETZA) 500 MG EXTENDED RELEASE TABLET    Take 1,000 mg by mouth 2 times daily (with meals)    NORTRIPTYLINE (PAMELOR) 50 MG CAPSULE    Take 50 mg by mouth nightly    OMEPRAZOLE (PRILOSEC) 40 MG DELAYED RELEASE CAPSULE    Take 1 capsule by mouth daily    PSYLLIUM (METAMUCIL) 58.6 % POWDER    Take by mouth 3 times daily. QUETIAPINE (SEROQUEL) 100 MG TABLET    1/2 TO 1 TAB Q HS       ALLERGIES     Patient has no known allergies. FAMILY HISTORY     History reviewed. No pertinent family history.        SOCIAL HISTORY       Social History     Socioeconomic History    Marital status: Single     Spouse name: None    Number of children: None    Years of education: None    Highest education level: None   Occupational History    None   Social Needs    Financial resource strain: None    Food insecurity     Worry: None     Inability: None    Transportation needs     Medical: None     Non-medical: None   Tobacco Use    Smoking status: Never Smoker    Smokeless tobacco: Never Used   Substance and Sexual Activity    Alcohol use: No     Alcohol/week: 0.0 standard drinks    Drug use: No    Sexual activity: None   Lifestyle    Physical activity     Days per week: None     Minutes per session: None    Stress: None   Relationships    Social connections     Talks on phone: None     Gets together: None     Attends [employer] service: None     Active member of club or organization: None     Attends meetings of clubs or organizations: None     Relationship status: None    Intimate partner violence     Fear of current or ex partner: None     Emotionally abused: None     Physically abused: None Forced sexual activity: None   Other Topics Concern    None   Social History Narrative    None       SCREENINGS      @FLOW([id number])@      PHYSICAL EXAM    (up to 7 for level 4, 8 or more for level 5)     ED Triage Vitals [11/26/20 2147]   BP Temp Temp Source Pulse Resp SpO2 Height Weight   (!) 156/87 98.3 °F (36.8 °C) Oral 98 16 99 % 5' 2\" (1.575 m) 165 lb (74.8 kg)       Physical Exam  Vitals signs and nursing note reviewed. Constitutional:       General: She is not in acute distress. Appearance: She is well-developed. She is not ill-appearing, toxic-appearing or diaphoretic. HENT:      Head: Normocephalic and atraumatic. Mouth/Throat:      Mouth: Mucous membranes are moist.   Eyes:      General:         Right eye: No discharge. Left eye: No discharge. Extraocular Movements: Extraocular movements intact. Pupils: Pupils are equal, round, and reactive to light. Neck:      Musculoskeletal: Normal range of motion and neck supple. Cardiovascular:      Rate and Rhythm: Normal rate and regular rhythm. Heart sounds: Normal heart sounds. No murmur. Pulmonary:      Effort: Pulmonary effort is normal. No respiratory distress. Breath sounds: Normal breath sounds. No stridor. Abdominal:      General: Bowel sounds are normal. There is no distension. Palpations: Abdomen is soft. Tenderness: There is abdominal tenderness in the right upper quadrant and left lower quadrant. There is no right CVA tenderness or left CVA tenderness. Hernia: No hernia is present. Musculoskeletal: Normal range of motion. Skin:     General: Skin is warm. Findings: No erythema. Neurological:      Mental Status: She is alert and oriented to person, place, and time.    Psychiatric:         Mood and Affect: Mood normal.         DIAGNOSTIC RESULTS     EKG: All EKG's are interpreted by the Emergency Department Physician who either signs or Co-signsthis chart in the absence of a cardiologist.         RADIOLOGY:   Johnella Belling such as CT, Ultrasound and MRI are read by the radiologist. Plain radiographic images are visualized and preliminarily interpreted by the emergency physician with the below findings:     see prelim rad report    Interpretation per the Radiologist below, if available at the time ofthis note:    CT ABDOMEN PELVIS W IV CONTRAST Additional Contrast? None    (Results Pending)         ED BEDSIDE ULTRASOUND:   Performed by ED Physician - none    LABS:  Labs Reviewed   COMPREHENSIVE METABOLIC PANEL - Abnormal; Notable for the following components:       Result Value    Glucose 156 (*)     ALT 39 (*)     AST 41 (*)     All other components within normal limits   URINE RT REFLEX TO CULTURE - Abnormal; Notable for the following components:    Ketones, Urine TRACE (*)     All other components within normal limits   POCT CREATININE - URINE - Normal   CBC WITH AUTO DIFFERENTIAL   LIPASE       All other labs were within normal range or not returned as of this dictation. EMERGENCY DEPARTMENT COURSE and DIFFERENTIAL DIAGNOSIS/MDM:   Vitals:    Vitals:    11/26/20 2147 11/27/20 0000   BP: (!) 156/87 (!) 155/75   Pulse: 98 95   Resp: 16    Temp: 98.3 °F (36.8 °C)    TempSrc: Oral    SpO2: 99% 99%   Weight: 165 lb (74.8 kg)    Height: 5' 2\" (1.575 m)             MDM  Number of Diagnoses or Management Options  Non-intractable vomiting with nausea, unspecified vomiting type:   Right upper quadrant abdominal pain:   Diagnosis management comments: Patient amatory in emergency room no acute distress conversant with right upper and left lower quadrant tenderness will add CT of abdomen as well as basic labs. We will add medications including antinausea medicines pain medicines.   Patient notes that she had cholecystectomy and hysterectomy  Patient labs reviewed CT preliminary scan reviewed will refer patient back to her gastroenterologist add medications for nausea vomiting and discomfort. Patient to return to if any symptoms worsen or new symptoms well       Amount and/or Complexity of Data Reviewed  Clinical lab tests: reviewed and ordered  Tests in the radiology section of CPT®: reviewed and ordered        CRITICAL CARE TIME       CONSULTS:  None    PROCEDURES:  Unless otherwise noted below, none     Procedures    FINAL IMPRESSION      1. Right upper quadrant abdominal pain    2.  Non-intractable vomiting with nausea, unspecified vomiting type          DISPOSITION/PLAN   DISPOSITION        PATIENT REFERRED TO:  Cecilia Forman DO  [address] [facility] [address]    Call in 1 day      Professor Erica Liu [address] [facility] [phone]    Call in 1 day      [facility]) ED  [address] [address]  [phone]    If symptoms worsen      DISCHARGE MEDICATIONS:  New Prescriptions    HYOSCYAMINE SULFATE SL (LEVSIN/SL) 0.125 MG SUBL    Place 0.125 mg under the tongue every 4 hours as needed (abdominal cramping)    MELOXICAM (MOBIC) 7.5 MG TABLET    Take 1 tablet by mouth daily    ONDANSETRON (ZOFRAN) 4 MG TABLET    Take 1 tablet by mouth every 8 hours as needed for Nausea          (Please note that portions of this note were completed with a voice recognition program.  Efforts were made to edit the dictations but occasionally words are mis-transcribed.)    Donald Garrett PA-C (electronically signed)  Attending Emergency Physician       Donald Garrett PA-C  11/27/20 0006       Donald Garrett PA-C  11/27/20 0008

## 2020-11-27 NOTE — ED NOTES
Discharge education reviewed. Patient instructed to follow up with GI and PCP; Come back to the ED with any new or worsening symptoms. No questions or concerns at this time. Patient ambulatory with steady gait.       Laure Maher RN  11/27/20 2778

## 2020-11-27 NOTE — ED TRIAGE NOTES
Patient presents to the Er with complaints of right sided abd pain  Complains of diarrhea with nausea, no vomiting  Sharp intermittent pain  Respirations even and unlabored  Afberile.

## 2021-05-03 ENCOUNTER — TELEPHONE (OUTPATIENT)
Dept: OBGYN CLINIC | Age: 56
End: 2021-05-03

## 2021-05-03 RX ORDER — ESTRADIOL 0.05 MG/D
FILM, EXTENDED RELEASE TRANSDERMAL
Qty: 8 PATCH | Refills: 11 | Status: SHIPPED | OUTPATIENT
Start: 2021-05-03

## 2021-05-11 RX ORDER — OMEPRAZOLE 40 MG/1
40 CAPSULE, DELAYED RELEASE ORAL DAILY
Qty: 30 CAPSULE | Refills: 3 | Status: SHIPPED | OUTPATIENT
Start: 2021-05-11 | End: 2021-10-12

## 2021-05-11 RX ORDER — FAMOTIDINE 20 MG/1
TABLET, FILM COATED ORAL
Qty: 60 TABLET | Refills: 3 | Status: SHIPPED | OUTPATIENT
Start: 2021-05-11 | End: 2021-10-12

## 2021-06-07 ENCOUNTER — APPOINTMENT (OUTPATIENT)
Dept: GENERAL RADIOLOGY | Age: 56
End: 2021-06-07
Payer: MEDICAID

## 2021-06-07 ENCOUNTER — APPOINTMENT (OUTPATIENT)
Dept: ULTRASOUND IMAGING | Age: 56
End: 2021-06-07
Payer: MEDICAID

## 2021-06-07 ENCOUNTER — HOSPITAL ENCOUNTER (EMERGENCY)
Age: 56
Discharge: HOME | End: 2021-06-07
Payer: MEDICAID

## 2021-06-07 VITALS
WEIGHT: 200 LBS | TEMPERATURE: 97.6 F | DIASTOLIC BLOOD PRESSURE: 69 MMHG | RESPIRATION RATE: 18 BRPM | BODY MASS INDEX: 36.8 KG/M2 | HEART RATE: 89 BPM | HEIGHT: 62 IN | SYSTOLIC BLOOD PRESSURE: 126 MMHG | OXYGEN SATURATION: 97 %

## 2021-06-07 DIAGNOSIS — S39.012A BACK STRAIN, INITIAL ENCOUNTER: Primary | ICD-10-CM

## 2021-06-07 DIAGNOSIS — S86.919A MUSCLE STRAIN OF LOWER LEG, INITIAL ENCOUNTER: ICD-10-CM

## 2021-06-07 LAB
CHP ED QC CHECK: YES
GLUCOSE BLD-MCNC: 304 MG/DL
GLUCOSE BLD-MCNC: 304 MG/DL (ref 60–115)
PERFORMED ON: ABNORMAL

## 2021-06-07 PROCEDURE — 72110 X-RAY EXAM L-2 SPINE 4/>VWS: CPT

## 2021-06-07 PROCEDURE — 93970 EXTREMITY STUDY: CPT

## 2021-06-07 PROCEDURE — 99283 EMERGENCY DEPT VISIT LOW MDM: CPT

## 2021-06-07 PROCEDURE — 93005 ELECTROCARDIOGRAM TRACING: CPT | Performed by: EMERGENCY MEDICINE

## 2021-06-07 RX ORDER — NAPROXEN 500 MG/1
500 TABLET ORAL 2 TIMES DAILY
Qty: 10 TABLET | Refills: 0 | Status: SHIPPED | OUTPATIENT
Start: 2021-06-07 | End: 2021-06-14 | Stop reason: SDUPTHER

## 2021-06-07 RX ORDER — CYCLOBENZAPRINE HCL 10 MG
10 TABLET ORAL 3 TIMES DAILY PRN
Qty: 15 TABLET | Refills: 0 | Status: SHIPPED | OUTPATIENT
Start: 2021-06-07 | End: 2021-06-12

## 2021-06-07 ASSESSMENT — PAIN DESCRIPTION - ORIENTATION: ORIENTATION: RIGHT;LEFT

## 2021-06-07 ASSESSMENT — ENCOUNTER SYMPTOMS
RHINORRHEA: 0
EYE DISCHARGE: 0
ABDOMINAL PAIN: 0
SHORTNESS OF BREATH: 0
COLOR CHANGE: 0
CONSTIPATION: 0
ABDOMINAL DISTENTION: 0
SORE THROAT: 0

## 2021-06-07 ASSESSMENT — PAIN DESCRIPTION - PAIN TYPE: TYPE: ACUTE PAIN

## 2021-06-07 ASSESSMENT — PAIN DESCRIPTION - LOCATION: LOCATION: BACK;LEG

## 2021-06-07 ASSESSMENT — PAIN SCALES - GENERAL: PAINLEVEL_OUTOF10: 10

## 2021-06-07 NOTE — ED TRIAGE NOTES
To ED with c/o mid-low back pain radiating down both legs. Able to ambulate, very painful, using w/c. MSPs intact. Denies injury. Also c/o \"bone\" pain mid sternum x many years from fibromyalgia. No SOB, skin warm and dry.   Triaged using Japanese video  Goran Jeter

## 2021-06-07 NOTE — ED PROVIDER NOTES
Genitourinary: Negative for difficulty urinating and dysuria. Musculoskeletal: Negative for arthralgias. Bilateral lower extremity pain, coccyx pain   Skin: Negative for color change, pallor, rash and wound. Neurological: Negative for dizziness, syncope, numbness and headaches. Psychiatric/Behavioral: Negative for agitation and confusion. Except as noted above the remainder of the review of systems was reviewed and negative. PAST MEDICAL HISTORY     Past Medical History:   Diagnosis Date    Arthritis     [facility]     DM (diabetes mellitus) ([facility]     Dyslipidemia     Fibromyalgia     GERD (gastroesophageal reflux disease)     HTN (hypertension)     Hypothyroidism          SURGICALHISTORY       Past Surgical History:   Procedure Laterality Date    CHOLECYSTECTOMY      COLONOSCOPY N/A 9/29/2020    COLONOSCOPY DIAGNOSTIC performed by Any Fletcher MD at [address] [facility] [facility] [address] NEEDLE BIOPSY LIVER PERCUTANEOUS  10/28/2020    CT NEEDLE BIOPSY LIVER PERCUTANEOUS [place] [facility] [place]    HYSTERECTOMY  07/28/2016     [facility]    THYROID SURGERY      UPPER GASTROINTESTINAL ENDOSCOPY N/A 9/29/2020    EGD ESOPHAGOGASTRODUODENOSCOPY with antral biopsies performed by Any Fletcher MD at [address] [place] [address]       Previous Medications    ACETAMINOPHEN (APAP EXTRA STRENGTH) 500 MG TABLET    Take 2 tablets by mouth every 6 hours as needed for Pain    ATORVASTATIN (LIPITOR) 20 MG TABLET    Take 20 mg by mouth nightly    BACLOFEN (LIORESAL) 5 MG TABLET        CETIRIZINE (ZYRTEC) 10 MG TABLET        DIVALPROEX (DEPAKOTE ER) 500 MG EXTENDED RELEASE TABLET        ESTRADIOL (VIVELLE) 0.05 MG/24HR    APPLY 1 PATCH EXTERNALLY TO THE SKIN 2 TIMES A WEEK    FAMOTIDINE (PEPCID) 20 MG TABLET    TAKE 1 TABLET BY MOUTH TWICE DAILY    FLUOXETINE (PROZAC) 20 MG CAPSULE    Take 1 capsule by mouth daily.     FLUOXETINE (PROZAC) 40 MG CAPSULE        FLUTICASONE (FLONASE) 50 MCG/ACT NASAL SPRAY        GLIPIZIDE (GLUCOTROL) 5 MG TABLET    Take 1 tablet by mouth Daily. HYOSCYAMINE SULFATE SL (LEVSIN/SL) 0.125 MG SUBL    Place 0.125 mg under the tongue every 4 hours as needed (abdominal cramping)    LEVOTHYROXINE (SYNTHROID) 150 MCG TABLET    Take 150 mcg by mouth every morning (before breakfast)    LISINOPRIL (PRINIVIL;ZESTRIL) 10 MG TABLET    Take 1 tablet by mouth daily DO NOT TAKE IF SYSTOLIC BP (TOP NUMBER) IS LESS THAN 100    LORATADINE (CLARITIN) 10 MG TABLET    Take 1 tablet by mouth daily. MELOXICAM (MOBIC) 7.5 MG TABLET    Take 1 tablet by mouth daily    METFORMIN, MOD, (GLUMETZA) 500 MG EXTENDED RELEASE TABLET    Take 1,000 mg by mouth 2 times daily (with meals)    NORTRIPTYLINE (PAMELOR) 50 MG CAPSULE    Take 50 mg by mouth nightly    OMEPRAZOLE (PRILOSEC) 40 MG DELAYED RELEASE CAPSULE    TAKE 1 CAPSULE BY MOUTH DAILY    ONDANSETRON (ZOFRAN) 4 MG TABLET    Take 1 tablet by mouth every 8 hours as needed for Nausea    PSYLLIUM (METAMUCIL) 58.6 % POWDER    Take by mouth 3 times daily. QUETIAPINE (SEROQUEL) 100 MG TABLET    1/2 TO 1 TAB Q HS       ALLERGIES     Patient has no known allergies. FAMILY HISTORY     History reviewed. No pertinent family history.        SOCIAL HISTORY       Social History     Socioeconomic History    Marital status: Single     Spouse name: None    Number of children: None    Years of education: None    Highest education level: None   Occupational History    None   Tobacco Use    Smoking status: Never Smoker    Smokeless tobacco: Never Used   Vaping Use    Vaping Use: Never used   Substance and Sexual Activity    Alcohol use: No     Alcohol/week: 0.0 standard drinks    Drug use: No    Sexual activity: None   Other Topics Concern    None   Social History Narrative    None     Social Determinants of Health     Financial Resource Strain:     Difficulty of Paying Living Expenses:    Food Insecurity:     Worried About Running Out of Food in the Last Year:    [address] in the Last Year:    Transportation Needs:     Lack of Transportation (Medical):  Lack of Transportation (Non-Medical):    Physical Activity:     Days of Exercise per Week:     Minutes of Exercise per Session:    Stress:     Feeling of Stress :    Social Connections:     Frequency of Communication with Friends and Family:     Frequency of Social Gatherings with Friends and Family:     Attends [employer] Services:     Active Member of Clubs or Organizations:     Attends Club or Organization Meetings:     Marital Status:    Intimate Partner Violence:     Fear of Current or Ex-Partner:     Emotionally Abused:     Physically Abused:     Sexually Abused:        SCREENINGS      @FLOW([id number])@      PHYSICAL EXAM    (up to 7 for level 4, 8 or more for level 5)     ED Triage Vitals [06/07/21 1431]   BP Temp Temp src Pulse Resp SpO2 Height Weight   124/70 97.6 °F (36.4 °C) -- 90 18 95 % 5' 2\" (1.575 m) 200 lb (90.7 kg)       Physical Exam  Vitals and nursing note reviewed. Constitutional:       General: She is not in acute distress. Appearance: She is well-developed. She is not ill-appearing, toxic-appearing or diaphoretic. HENT:      Head: Normocephalic. Nose: No congestion. Mouth/Throat:      Mouth: Mucous membranes are moist.      Pharynx: No oropharyngeal exudate or posterior oropharyngeal erythema. Eyes:      Extraocular Movements: Extraocular movements intact. Conjunctiva/sclera: Conjunctivae normal.      Pupils: Pupils are equal, round, and reactive to light. Neck:      Vascular: No JVD. Trachea: No tracheal deviation. Cardiovascular:      Rate and Rhythm: Normal rate. Pulses: Normal pulses. Heart sounds: Normal heart sounds. No murmur heard. No friction rub. No gallop. Pulmonary:      Effort: Pulmonary effort is normal. No tachypnea, accessory muscle usage, respiratory distress or retractions.       Breath sounds: No stridor. No wheezing, rhonchi or rales. Comments: Lung sounds are clear in all fields, there is no wheezes rales or rhonchi, no excess muscle use, retractions, no pain to chest, no pain with deep inspiration, movement or By palpation  Chest:      Chest wall: No tenderness. Abdominal:      General: Abdomen is flat. Bowel sounds are normal. There is no distension or abdominal bruit. Palpations: There is no shifting dullness, fluid wave, hepatomegaly, splenomegaly, mass or pulsatile mass. Tenderness: There is no abdominal tenderness. There is no right CVA tenderness, left CVA tenderness, guarding or rebound. Negative signs include Galicia's sign, Rovsing's sign and McBurney's sign. Musculoskeletal:         General: No deformity. Cervical back: Normal range of motion and neck supple. No rigidity. Legs:       Comments: Patient has pain on palpation on posterior aspect of lower extremities from the knee to the ankle, positive Homans exam according to patient, there is no visible signs of swelling of veins, no erythema, no edema, no ecchymosis. Positive dorsalis pedis and posterior tibialis pulses, skin is pink warm and dry. Patient is able to move ankle foot toes knees hips well without any increase in pain. Skin:     General: Skin is warm and dry. Capillary Refill: Capillary refill takes less than 2 seconds. Coloration: Skin is not jaundiced. Neurological:      General: No focal deficit present. Mental Status: She is alert and oriented to person, place, and time. Mental status is at baseline. Cranial Nerves: No cranial nerve deficit. Sensory: No sensory deficit. Motor: No weakness.       Coordination: Coordination normal.   Psychiatric:         Mood and Affect: Mood normal.         DIAGNOSTIC RESULTS     EKG: All EKG's are interpreted by the Emergency Department Physician who either signs or Co-signsthis chart in the absence of a cardiologist.    EKG shows normal sinus rhythm at 77 bpm there is no acute ST segment abnormality no ventricular ectopy QTC is 436 ms    RADIOLOGY:   Non-plain filmimages such as CT, Ultrasound and MRI are read by the radiologist. Plain radiographic images are visualized and preliminarily interpreted by the emergency physician with the below findings:    X-ray of the lumbar spine shows no acute fracture or subluxation. Interpretation per the Radiologist below, if available at the time ofthis note:    XR LUMBAR SPINE (MIN 4 VIEWS)   Final Result       There are no acute changes. US DUP LOWER EXTREMITIES BILATERAL VENOUS    (Results Pending)         ED BEDSIDE ULTRASOUND:   Performed by ED Physician - none    LABS:  Labs Reviewed   POCT GLUCOSE - Normal       All other labs were within normal range or not returned as of this dictation. EMERGENCY DEPARTMENT COURSE and DIFFERENTIAL DIAGNOSIS/MDM:   Vitals:    Vitals:    06/07/21 1431   BP: 124/70   Pulse: 90   Resp: 18   Temp: 97.6 °F (36.4 °C)   SpO2: 95%   Weight: 200 lb (90.7 kg)   Height: 5' 2\" (1.575 m)        MDM  Number of Diagnoses or Management Options  Back strain, initial encounter  Muscle strain of lower leg, initial encounter  Diagnosis management comments: Patient presented to the emergency department with complaint of low back pain, and bilateral lower extremity pain. Patient states that her family convinced her to come to the ER to have an ultrasound to make sure she did not have blood clots. Patient denies any recent travel, recent injuries. She states there is a family history of clotting disorder. She denies any chest pain or shortness of breath. She appears in no acute distress. Ultrasound bilateral extremities is negative for DVT. X-ray of the lumbar spine shows no acute fracture or subluxations, no lytic lesions or sclerotic bone lesions.   Patient given a prescription for Naprosyn, Flexeril, and advised to contact her family doctor for follow-up in the next 2 to 3 days. She was advised if there is worsening or changes symptoms to return to the ER. CRITICAL CARE TIME   Total Critical Care time was 0 minutes, excluding separately reportableprocedures. There was a high probability of clinicallysignificant/life threatening deterioration in the patient's condition which required my urgent intervention. CONSULTS:  None    PROCEDURES:  Unless otherwise noted below, none     Procedures    FINAL IMPRESSION      1. Back strain, initial encounter    2.  Muscle strain of lower leg, initial encounter          DISPOSITION/PLAN   DISPOSITION Decision To Discharge 06/07/2021 05:04:08 PM      PATIENT REFERRED TO:  Samuel Armstrong DO  [address] [place] [id number]  [phone]    In 3 days        DISCHARGE MEDICATIONS:  New Prescriptions    CYCLOBENZAPRINE (FLEXERIL) 10 MG TABLET    Take 1 tablet by mouth 3 times daily as needed for Muscle spasms    NAPROXEN (NAPROSYN) 500 MG TABLET    Take 1 tablet by mouth 2 times daily for 5 days          (Please note that portions of this note were completed with a voice recognition program.  Efforts were made to edit the dictations but occasionally words are mis-transcribed.)    Aries Canchola PA-C (electronically signed)  Attending Emergency Physician         Aries Canchola PA-C  06/07/21 3066

## 2021-06-08 LAB
EKG ATRIAL RATE: 77 BPM
EKG P AXIS: 35 DEGREES
EKG P-R INTERVAL: 142 MS
EKG Q-T INTERVAL: 386 MS
EKG QRS DURATION: 78 MS
EKG QTC CALCULATION (BAZETT): 436 MS
EKG R AXIS: 32 DEGREES
EKG T AXIS: 49 DEGREES
EKG VENTRICULAR RATE: 77 BPM

## 2021-06-08 PROCEDURE — 93010 ELECTROCARDIOGRAM REPORT: CPT | Performed by: INTERNAL MEDICINE

## 2021-06-14 ENCOUNTER — HOSPITAL ENCOUNTER (EMERGENCY)
Age: 56
Discharge: HOME | End: 2021-06-14
Payer: MEDICAID

## 2021-06-14 ENCOUNTER — APPOINTMENT (OUTPATIENT)
Dept: GENERAL RADIOLOGY | Age: 56
End: 2021-06-14
Payer: MEDICAID

## 2021-06-14 VITALS
HEART RATE: 84 BPM | WEIGHT: 200 LBS | BODY MASS INDEX: 36.8 KG/M2 | DIASTOLIC BLOOD PRESSURE: 91 MMHG | OXYGEN SATURATION: 99 % | HEIGHT: 62 IN | SYSTOLIC BLOOD PRESSURE: 157 MMHG | RESPIRATION RATE: 20 BRPM | TEMPERATURE: 98.5 F

## 2021-06-14 DIAGNOSIS — S60.551A FOREIGN BODY OF RIGHT HAND, INITIAL ENCOUNTER: Primary | ICD-10-CM

## 2021-06-14 PROCEDURE — 99284 EMERGENCY DEPT VISIT MOD MDM: CPT

## 2021-06-14 PROCEDURE — 73130 X-RAY EXAM OF HAND: CPT

## 2021-06-14 PROCEDURE — 6370000000 HC RX 637 (ALT 250 FOR IP): Performed by: PHYSICIAN ASSISTANT

## 2021-06-14 RX ORDER — NAPROXEN 500 MG/1
500 TABLET ORAL 2 TIMES DAILY
Qty: 10 TABLET | Refills: 0 | Status: SHIPPED | OUTPATIENT
Start: 2021-06-14 | End: 2022-09-29 | Stop reason: SDUPTHER

## 2021-06-14 RX ORDER — CEPHALEXIN 500 MG/1
500 CAPSULE ORAL ONCE
Status: COMPLETED | OUTPATIENT
Start: 2021-06-14 | End: 2021-06-14

## 2021-06-14 RX ORDER — BACITRACIN, NEOMYCIN, POLYMYXIN B 400; 3.5; 5 [USP'U]/G; MG/G; [USP'U]/G
OINTMENT TOPICAL ONCE
Status: COMPLETED | OUTPATIENT
Start: 2021-06-14 | End: 2021-06-14

## 2021-06-14 RX ORDER — CEPHALEXIN 500 MG/1
500 CAPSULE ORAL 4 TIMES DAILY
Qty: 28 CAPSULE | Refills: 0 | Status: SHIPPED | OUTPATIENT
Start: 2021-06-14 | End: 2021-06-21

## 2021-06-14 RX ADMIN — BACITRACIN, NEOMYCIN, POLYMYXIN B 1 G: 400; 3.5; 5 OINTMENT TOPICAL at 21:38

## 2021-06-14 RX ADMIN — CEPHALEXIN 500 MG: 500 CAPSULE ORAL at 21:39

## 2021-06-14 ASSESSMENT — ENCOUNTER SYMPTOMS
APNEA: 0
ANAL BLEEDING: 0
COLOR CHANGE: 1
PHOTOPHOBIA: 0
ABDOMINAL DISTENTION: 0
VOICE CHANGE: 0
VOMITING: 0
NAUSEA: 0
EYE DISCHARGE: 0

## 2021-06-14 ASSESSMENT — PAIN SCALES - GENERAL: PAINLEVEL_OUTOF10: 10

## 2021-06-14 ASSESSMENT — PAIN DESCRIPTION - LOCATION: LOCATION: HAND

## 2021-06-14 ASSESSMENT — PAIN DESCRIPTION - ORIENTATION: ORIENTATION: RIGHT

## 2021-06-14 NOTE — ED NOTES
Bed: 06  Expected date:   Expected time:   Means of arrival:   Comments:     Eduardo Brock, RN  06/14/21 1939

## 2021-06-15 NOTE — ED PROVIDER NOTES
[address] [facility] ED  eMERGENCY dEPARTMENT eNCOUnter      Pt Name: Daniela Torres  MRN: [id number]  [place] 1965  Date of evaluation: 6/14/2021  Provider: Nata Bass PA-C    CHIEF COMPLAINT       Chief Complaint   Patient presents with    Hand Injury         HISTORY OF PRESENT ILLNESS   (Location/Symptom, Timing/Onset,Context/Setting, Quality, Duration, Modifying Factors, Severity)  Note limiting factors. Daniela Torres is a 48 y.o. female who presents to the emergency department patient presents with right hand injury she had a splinter in her hand x1 week she attempted to remove most of it she suspects is still a small portion in there. She does have redness and no drainage at hand she does have open 4 mm lesion in hand. Is mild to moderate severity worse touch motion with improved symptoms. HPI    NursingNotes were reviewed. REVIEW OF SYSTEMS    (2-9 systems for level 4, 10 or more for level 5)     Review of Systems   Constitutional: Negative for activity change, appetite change, chills, fever and unexpected weight change. HENT: Negative for ear discharge, nosebleeds and voice change. Eyes: Negative for photophobia and discharge. Respiratory: Negative for apnea. Cardiovascular: Negative for chest pain. Gastrointestinal: Negative for abdominal distention, anal bleeding, nausea and vomiting. Endocrine: Negative for cold intolerance, heat intolerance and polyphagia. Genitourinary: Negative for genital sores. Musculoskeletal: Negative for joint swelling. Skin: Positive for color change and wound. Negative for pallor. Allergic/Immunologic: Negative for immunocompromised state. Neurological: Negative for seizures and facial asymmetry. Hematological: Does not bruise/bleed easily. Psychiatric/Behavioral: Negative for behavioral problems, self-injury and sleep disturbance. All other systems reviewed and are negative.       Except as noted above the remainder of the daily DO NOT TAKE IF SYSTOLIC BP (TOP NUMBER) IS LESS THAN 100    LORATADINE (CLARITIN) 10 MG TABLET    Take 1 tablet by mouth daily. METFORMIN, MOD, (GLUMETZA) 500 MG EXTENDED RELEASE TABLET    Take 1,000 mg by mouth 2 times daily (with meals)    NORTRIPTYLINE (PAMELOR) 50 MG CAPSULE    Take 50 mg by mouth nightly    OMEPRAZOLE (PRILOSEC) 40 MG DELAYED RELEASE CAPSULE    TAKE 1 CAPSULE BY MOUTH DAILY    ONDANSETRON (ZOFRAN) 4 MG TABLET    Take 1 tablet by mouth every 8 hours as needed for Nausea    PSYLLIUM (METAMUCIL) 58.6 % POWDER    Take by mouth 3 times daily. QUETIAPINE (SEROQUEL) 100 MG TABLET    1/2 TO 1 TAB Q HS       ALLERGIES     Patient has no known allergies. FAMILY HISTORY     No family history on file. SOCIAL HISTORY       Social History     Socioeconomic History    Marital status: Single     Spouse name: Not on file    Number of children: Not on file    Years of education: Not on file    Highest education level: Not on file   Occupational History    Not on file   Tobacco Use    Smoking status: Never Smoker    Smokeless tobacco: Never Used   Vaping Use    Vaping Use: Never used   Substance and Sexual Activity    Alcohol use: No     Alcohol/week: 0.0 standard drinks    Drug use: No    Sexual activity: Not on file   Other Topics Concern    Not on file   Social History Narrative    Not on file     Social Determinants of Health     Financial Resource Strain:     Difficulty of Paying Living Expenses:    Food Insecurity:     Worried About Running Out of Food in the Last Year:     Ran Out of Food in the Last Year:    Transportation Needs:     Lack of Transportation (Medical):      Lack of Transportation (Non-Medical):    Physical Activity:     Days of Exercise per Week:     Minutes of Exercise per Session:    Stress:     Feeling of Stress :    Social Connections:     Frequency of Communication with Friends and Family:     Frequency of Social Gatherings with Friends and Family:     Attends Orthodoxy Services:     Active Member of Clubs or Organizations:     Attends Club or Organization Meetings:     Marital Status:    Intimate Partner Violence:     Fear of Current or Ex-Partner:     Emotionally Abused:     Physically Abused:     Sexually Abused:        SCREENINGS    Farzad Coma Scale  Eye Opening: Spontaneous  Best Verbal Response: Oriented  Best Motor Response: Obeys commands  [place] Coma Scale Score: 15 @FLOW([id number])@      PHYSICAL EXAM    (up to 7 for level 4, 8 or more for level 5)     ED Triage Vitals [06/14/21 1943]   BP Temp Temp Source Pulse Resp SpO2 Height Weight   (!) 157/91 98.5 °F (36.9 °C) Oral 84 20 99 % 5' 2\" (1.575 m) 200 lb (90.7 kg)       Physical Exam  Vitals and nursing note reviewed. Constitutional:       General: She is not in acute distress. Appearance: She is well-developed. HENT:      Head: Normocephalic and atraumatic. Right Ear: External ear normal.      Left Ear: External ear normal.      Nose: Nose normal.      Mouth/Throat:      Mouth: Mucous membranes are moist.   Eyes:      General:         Right eye: No discharge. Left eye: No discharge. Pupils: Pupils are equal, round, and reactive to light. Cardiovascular:      Rate and Rhythm: Normal rate and regular rhythm. Heart sounds: Normal heart sounds. Pulmonary:      Effort: Pulmonary effort is normal. No respiratory distress. Breath sounds: Normal breath sounds. No stridor. Abdominal:      General: Bowel sounds are normal. There is no distension. Palpations: Abdomen is soft. Musculoskeletal:         General: Normal range of motion. Cervical back: Normal range of motion and neck supple. Skin:     General: Skin is warm. Findings: Erythema and lesion present. Comments: Right hand at base of thumb 4 mm lesion possible small FB noted x-ray negative.    Neurological:      Mental Status: She is alert and oriented to person, place, and time.   Psychiatric:         Mood and Affect: Mood normal.         DIAGNOSTIC RESULTS     EKG: All EKG's are interpreted by the Emergency Department Physician who either signs or Co-signsthis chart in the absence of a cardiologist.         RADIOLOGY:   Marveen Brunner such as CT, Ultrasound and MRI are read by the radiologist. Plain radiographic images are visualized and preliminarily interpreted by the emergency physician with the below findings:    neg    Interpretation per the Radiologist below, if available at the time ofthis note:    XR HAND RIGHT (MIN 3 VIEWS)    (Results Pending)         ED BEDSIDE ULTRASOUND:   Performed by ED Physician - none    LABS:  Labs Reviewed - No data to display    All other labs were within normal range or not returned as of this dictation. EMERGENCY DEPARTMENT COURSE and DIFFERENTIAL DIAGNOSIS/MDM:   Vitals:    Vitals:    06/14/21 1943   BP: (!) 157/91   Pulse: 84   Resp: 20   Temp: 98.5 °F (36.9 °C)   TempSrc: Oral   SpO2: 99%   Weight: 200 lb (90.7 kg)   Height: 5' 2\" (1.575 m)            MDM  Number of Diagnoses or Management Options  Foreign body of right hand, initial encounter  Diagnosis management comments: Patient prepped with Betadine anesthetized with 2% lidocaine without epinephrine. Patient wound probed with sterile instruments no foreign body noted will place patient on Keflex as well as follow-up primary care return if any symptoms worsen use as well. CRITICAL CARE TIME       CONSULTS:  None    PROCEDURES:  Unless otherwise noted below, none     Foreign Body    Date/Time: 6/14/2021 9:41 PM  Performed by: Yudy Ureña PA-C  Authorized by: Yudy Ureña PA-C           FINAL IMPRESSION      1.  Foreign body of right hand, initial encounter          DISPOSITION/PLAN   DISPOSITION Decision To Discharge 06/14/2021 09:38:38 PM      PATIENT REFERRED TO:  Yayo Nguyen DO  [address] [place] [address]  [phone]    Call in 1 day      [facility]) ED  [address] [address]  [phone]    If symptoms worsen      DISCHARGE MEDICATIONS:  New Prescriptions    CEPHALEXIN (KEFLEX) 500 MG CAPSULE    Take 1 capsule by mouth 4 times daily for 7 days          (Please note that portions of this note were completed with a voice recognition program.  Efforts were made to edit the dictations but occasionally words are mis-transcribed.)    Kwadwo Melo PA-C (electronically signed)  Attending Emergency Physician       Kwadwo Melo PA-C  06/14/21 [id number]

## 2021-06-15 NOTE — ED NOTES
Discharge instructions given. Verbalized understanding. A&Ox4. Ambulates from ED with steady gait.       Jayda Hester RN  06/14/21 [id number]

## 2021-07-14 ENCOUNTER — OFFICE VISIT (OUTPATIENT)
Dept: GASTROENTEROLOGY | Age: 56
End: 2021-07-14
Payer: MEDICAID

## 2021-07-14 VITALS
BODY MASS INDEX: 36.8 KG/M2 | DIASTOLIC BLOOD PRESSURE: 64 MMHG | HEIGHT: 62 IN | WEIGHT: 200 LBS | SYSTOLIC BLOOD PRESSURE: 118 MMHG

## 2021-07-14 DIAGNOSIS — E11.9 TYPE 2 DIABETES MELLITUS WITHOUT COMPLICATION, WITHOUT LONG-TERM CURRENT USE OF INSULIN (HCC): ICD-10-CM

## 2021-07-14 DIAGNOSIS — R10.11 RIGHT UPPER QUADRANT ABDOMINAL PAIN: ICD-10-CM

## 2021-07-14 DIAGNOSIS — K75.81 NASH (NONALCOHOLIC STEATOHEPATITIS): Primary | ICD-10-CM

## 2021-07-14 DIAGNOSIS — K21.9 GASTROESOPHAGEAL REFLUX DISEASE WITHOUT ESOPHAGITIS: ICD-10-CM

## 2021-07-14 DIAGNOSIS — K75.81 NASH (NONALCOHOLIC STEATOHEPATITIS): ICD-10-CM

## 2021-07-14 LAB
HBV SURFACE AB TITR SER: REACTIVE MIU/ML
HEPATITIS B SURFACE ANTIGEN INTERPRETATION: NORMAL
HEPATITIS C ANTIBODY INTERPRETATION: NORMAL

## 2021-07-14 PROCEDURE — 99214 OFFICE O/P EST MOD 30 MIN: CPT | Performed by: NURSE PRACTITIONER

## 2021-07-14 RX ORDER — HYOSCYAMINE SULFATE 0.12 MG/1
125 TABLET SUBLINGUAL EVERY 4 HOURS PRN
Qty: 120 EACH | Refills: 1 | Status: SHIPPED | OUTPATIENT
Start: 2021-07-14

## 2021-07-14 ASSESSMENT — ENCOUNTER SYMPTOMS
VOICE CHANGE: 0
BLOOD IN STOOL: 0
EYES NEGATIVE: 1
CHOKING: 0
ANAL BLEEDING: 0
DIARRHEA: 0
VOMITING: 0
NAUSEA: 1
CONSTIPATION: 0
ABDOMINAL DISTENTION: 0
RECTAL PAIN: 0
ABDOMINAL PAIN: 0
TROUBLE SWALLOWING: 0

## 2021-07-14 NOTE — PROGRESS NOTES
Gastroenterology Clinic Follow up Visit    Eda Alejandro  [id number]  Chief Complaint   Patient presents with    Follow-up     CARBAJAL and GERD     *Of note,  services was used during today's visit. *    HPI: 48 y.o. female following up after last GI clinic on 11/16/2020. Interval change: Patient following up in the GI clinic for history of CARBAJAL and GERD. Patient denies GERD symptoms, states she takes Prilosec 40mg daily without breakthrough symptoms. She denies vomiting, hematemesis, or dysphagia. She does endorse occasional nausea in the morning. Patient reports right upper quadrant pain/cramping that began 1 week ago. She attributes this to her fatty liver. She states it is cramping in nature and radiates to her back. No aggravating or relieving factors. States she has lost a minimal amount of weight since her last visit. However, she has begun hiking this summer and plans to be more active to help with weight loss. She is up to date on her immunizations except she has not had her Hepatitis B vaccine series. She denies alcohol use, nonsmoker, will use tylenol occasionally for pain. HPI from last GI clinic visit on 11/16/2020  summarized below:  Patient following up to discuss liver biopsy results. Patient denies abdominal pain, nausea or vomiting. Constipation has completely resolved with daily bowel regimen consisting of MiraLAX and fiber. No melena or hematochezia. Currently no weight loss or loss of appetite. No NSAID use, alcohol or recreational drug use. FibroScan was performed in September, 2020 with low probability of fibrosis stage IV. Slightly elevated transaminases. Patient wanted to proceed with liver biopsy for definitive diagnosis. She  denies chest pain, shortness of breath, or palpitations. She denies jaundice.   She denies any syncopal episodes.     Previous GI work up/Endoscopic investigations:   Liver biopsy 10/28/2020mild chronic hepatitis with macrovesicular steatosis. The histologic findings are consistent with the clinical impression of [facility]. NAFLD activity score and fibrosis staging: Steatosis score 2, lobular inflammation score 2, hepatocyte ballooning score 1, fibrosis stage IA. Colonoscopy  9/20-sigmoid diverticulosis.  External hemorrhoids.  Otherwise a normal exam.    EGD 9/20 patchy antral gastritis.  Irregular Z line.  Histology negative for HP.     Review of Systems   Constitutional: Negative for appetite change, fatigue, fever and unexpected weight change. HENT: Negative for trouble swallowing and voice change. Eyes: Negative. Respiratory: Negative for choking. Cardiovascular: Negative. Gastrointestinal: Positive for nausea (occasionally in the morning). Negative for abdominal distention, abdominal pain, anal bleeding, blood in stool, constipation, diarrhea, rectal pain and vomiting. Endocrine: Negative. Genitourinary: Negative. Musculoskeletal: Negative. Skin: Negative. Allergic/Immunologic: Negative for food allergies. Neurological: Negative. Hematological: Negative. Psychiatric/Behavioral: Negative for agitation, decreased concentration, self-injury, sleep disturbance and suicidal ideas. The patient is not nervous/anxious. Past medical history, past surgical history, medication list, social and familyhistory reviewed    Blood pressure 118/64, height 5' 2\" (1.575 m), weight 200 lb (90.7 kg), last menstrual period 03/27/2016, not currently breastfeeding. Physical Exam  Constitutional:       General: She is not in acute distress. Appearance: Normal appearance. She is normal weight. She is not ill-appearing. HENT:      Head: Normocephalic and atraumatic. Eyes:      General: No scleral icterus. Cardiovascular:      Rate and Rhythm: Normal rate and regular rhythm. Pulses: Normal pulses. Pulmonary:      Effort: Pulmonary effort is normal. No respiratory distress.       Breath sounds: Normal breath sounds. Abdominal:      General: Bowel sounds are normal. There is no distension. Palpations: Abdomen is soft. There is no mass. Tenderness: There is abdominal tenderness (mild) in the right upper quadrant. There is no guarding or rebound. Musculoskeletal:         General: Normal range of motion. Lymphadenopathy:      Cervical: No cervical adenopathy. Neurological:      Mental Status: She is alert and oriented to person, place, and time. Psychiatric:         Mood and Affect: Mood normal.         Behavior: Behavior normal.         Thought Content: Thought content normal.         Judgment: Judgment normal.       Laboratory, Pathology, Radiology reviewed in detail with relevantimportant investigations summarized below:    No results for input(s): WBC, HGB, HCT, MCV, PLT in the last 720 hours. Lab Results   Component Value Date    WBC 9.6 11/26/2020    HGB 14.2 11/26/2020    HCT 43.0 11/26/2020    MCV 88.1 11/26/2020     11/26/2020     Lab Results   Component Value Date    ALT 39 (H) 11/26/2020    AST 41 (H) 11/26/2020    ALKPHOS 104 11/26/2020    BILITOT 0.3 11/26/2020     Labwork from [facility] [place] 4/27/21: ALT 46, AST 62, bilirubin 0.5, alkaline phosphatase 109, albumin 4.1, HgbA1c 10.8%. Assessment and Plan:  Erma Wiggins 48 y.o. female with history of type 2 diabetes (recent Hgb A1c revels poor control at 10.8%) S/P liver biopsy 10/28/2020 mild chronic hepatitis with macrovesicular steatosis. The histologic findings are consistent with the clinical impression of [facility]. Previous labwork to rule out viral, autoimmune or metabolic etiology was not completed, will reorder. Patient has not reported weight reduction as advised at previous encounters. Patient also reports RUQ abdominal cramp/ache, attributes this to her fatty liver.    -Discussed at length the importance of strict glucose control and weight reduction to reduce the amount of inflammation and fatty infiltration of her liver.  Patient agreeable to referral to nutrition services.   -Discussed with patient if her diabetes remains uncontrolled despite activity and dietary changed to pursue endocrine consult through her PCP. -Upon review of medical record patient has taken Levsin in the past, will reorder for her RUQ cramping.  -Discussed importance of alcohol abstinence and up to date vaccinations. -Discussed Hepatitis B vaccine can be obtained by her PCP. Surveillance colonoscopy due 2025 due to family history of colon cancer. Return in about 3 months (around 10/14/2021).     FRANCA Kate - CNP   Staff Gastroenterology Nurse Practitioner  [facility]

## 2021-07-15 LAB
HAV AB SERPL IA-ACNC: NEGATIVE
HEPATITIS B CORE TOTAL ANTIBODY: POSITIVE

## 2021-07-16 LAB
IGA: 459 MG/DL (ref 68–408)
IGG: 976 MG/DL (ref 768–1632)
IGM: 113 MG/DL (ref 35–263)
MITOCHONDRIAL M2 AB, IGG: 0.6 U/ML (ref 0–4)

## 2021-07-20 LAB — F-ACTIN AB IGA: 8.7 UNITS (ref 0–24.9)

## 2021-07-27 ENCOUNTER — TELEPHONE (OUTPATIENT)
Dept: GASTROENTEROLOGY | Age: 56
End: 2021-07-27

## 2021-07-29 NOTE — TELEPHONE ENCOUNTER
Called patient back to speak with her but she needs an . Stated will call her back in the morning with  services. Patient is agreeable.

## 2021-07-30 NOTE — TELEPHONE ENCOUNTER
Called patient. Spoke with her about her lab results. Says the Levsin sent to the pharmacy wasn't covered. Encouraged her to try OTC IBgard to see if this helps her symptoms. Patient is agreeable and states understanding.

## 2021-08-18 ENCOUNTER — HOSPITAL ENCOUNTER (EMERGENCY)
Age: 56
Discharge: HOME | End: 2021-08-19
Attending: EMERGENCY MEDICINE
Payer: MEDICAID

## 2021-08-18 ENCOUNTER — APPOINTMENT (OUTPATIENT)
Dept: GENERAL RADIOLOGY | Age: 56
End: 2021-08-18
Payer: MEDICAID

## 2021-08-18 DIAGNOSIS — R07.9 CHEST PAIN, UNSPECIFIED TYPE: Primary | ICD-10-CM

## 2021-08-18 LAB
ALBUMIN SERPL-MCNC: 4.2 G/DL (ref 3.5–4.6)
ALP BLD-CCNC: 98 U/L (ref 40–130)
ALT SERPL-CCNC: 38 U/L (ref 0–33)
ANION GAP SERPL CALCULATED.3IONS-SCNC: 17 MEQ/L (ref 9–15)
AST SERPL-CCNC: 53 U/L (ref 0–35)
BASOPHILS ABSOLUTE: 0.1 K/UL (ref 0–0.2)
BASOPHILS RELATIVE PERCENT: 1.1 %
BILIRUB SERPL-MCNC: 0.4 MG/DL (ref 0.2–0.7)
BUN BLDV-MCNC: 11 MG/DL (ref 6–20)
CALCIUM SERPL-MCNC: 8.7 MG/DL (ref 8.5–9.9)
CHLORIDE BLD-SCNC: 95 MEQ/L (ref 95–107)
CO2: 25 MEQ/L (ref 20–31)
CREAT SERPL-MCNC: 0.81 MG/DL (ref 0.5–0.9)
EOSINOPHILS ABSOLUTE: 0.1 K/UL (ref 0–0.7)
EOSINOPHILS RELATIVE PERCENT: 1.3 %
GFR AFRICAN AMERICAN: >60
GFR NON-AFRICAN AMERICAN: >60
GLOBULIN: 3 G/DL (ref 2.3–3.5)
GLUCOSE BLD-MCNC: 236 MG/DL (ref 70–99)
HCT VFR BLD CALC: 41.9 % (ref 37–47)
HEMOGLOBIN: 13.6 G/DL (ref 12–16)
LYMPHOCYTES ABSOLUTE: 2.4 K/UL (ref 1–4.8)
LYMPHOCYTES RELATIVE PERCENT: 29.3 %
MCH RBC QN AUTO: 28.7 PG (ref 27–31.3)
MCHC RBC AUTO-ENTMCNC: 32.6 % (ref 33–37)
MCV RBC AUTO: 88.2 FL (ref 82–100)
MONOCYTES ABSOLUTE: 0.8 K/UL (ref 0.2–0.8)
MONOCYTES RELATIVE PERCENT: 9.3 %
NEUTROPHILS ABSOLUTE: 4.7 K/UL (ref 1.4–6.5)
NEUTROPHILS RELATIVE PERCENT: 59 %
PDW BLD-RTO: 14.7 % (ref 11.5–14.5)
PLATELET # BLD: 235 K/UL (ref 130–400)
POTASSIUM SERPL-SCNC: 3.7 MEQ/L (ref 3.4–4.9)
RBC # BLD: 4.75 M/UL (ref 4.2–5.4)
SODIUM BLD-SCNC: 137 MEQ/L (ref 135–144)
TOTAL PROTEIN: 7.2 G/DL (ref 6.3–8)
TROPONIN: <0.01 NG/ML (ref 0–0.01)
WBC # BLD: 8 K/UL (ref 4.8–10.8)

## 2021-08-18 PROCEDURE — 6370000000 HC RX 637 (ALT 250 FOR IP): Performed by: EMERGENCY MEDICINE

## 2021-08-18 PROCEDURE — 71045 X-RAY EXAM CHEST 1 VIEW: CPT

## 2021-08-18 PROCEDURE — 80053 COMPREHEN METABOLIC PANEL: CPT

## 2021-08-18 PROCEDURE — 93005 ELECTROCARDIOGRAM TRACING: CPT | Performed by: EMERGENCY MEDICINE

## 2021-08-18 PROCEDURE — 85025 COMPLETE CBC W/AUTO DIFF WBC: CPT

## 2021-08-18 PROCEDURE — 36415 COLL VENOUS BLD VENIPUNCTURE: CPT

## 2021-08-18 PROCEDURE — 99283 EMERGENCY DEPT VISIT LOW MDM: CPT

## 2021-08-18 PROCEDURE — 84484 ASSAY OF TROPONIN QUANT: CPT

## 2021-08-18 RX ORDER — NITROGLYCERIN 0.4 MG/1
0.4 TABLET SUBLINGUAL EVERY 5 MIN PRN
Status: DISCONTINUED | OUTPATIENT
Start: 2021-08-18 | End: 2021-08-19 | Stop reason: HOSPADM

## 2021-08-18 RX ORDER — ASPIRIN 81 MG/1
162 TABLET, CHEWABLE ORAL ONCE
Status: COMPLETED | OUTPATIENT
Start: 2021-08-18 | End: 2021-08-18

## 2021-08-18 RX ADMIN — ASPIRIN 162 MG: 81 TABLET, CHEWABLE ORAL at 22:11

## 2021-08-18 RX ADMIN — NITROGLYCERIN 0.4 MG: 0.4 TABLET, ORALLY DISINTEGRATING SUBLINGUAL at 22:12

## 2021-08-18 ASSESSMENT — ENCOUNTER SYMPTOMS
COUGH: 0
EYE DISCHARGE: 0
ABDOMINAL DISTENTION: 0
CHEST TIGHTNESS: 0
VOMITING: 0
SORE THROAT: 0
WHEEZING: 0
DIARRHEA: 0
PHOTOPHOBIA: 0
ABDOMINAL PAIN: 0
BACK PAIN: 0
SHORTNESS OF BREATH: 0
NAUSEA: 0

## 2021-08-18 ASSESSMENT — PAIN DESCRIPTION - DESCRIPTORS: DESCRIPTORS: TIGHTNESS

## 2021-08-18 ASSESSMENT — PAIN DESCRIPTION - LOCATION: LOCATION: CHEST

## 2021-08-18 ASSESSMENT — PAIN SCALES - GENERAL: PAINLEVEL_OUTOF10: 9

## 2021-08-19 VITALS
HEIGHT: 62 IN | SYSTOLIC BLOOD PRESSURE: 128 MMHG | HEART RATE: 75 BPM | BODY MASS INDEX: 36.8 KG/M2 | DIASTOLIC BLOOD PRESSURE: 80 MMHG | WEIGHT: 200 LBS | OXYGEN SATURATION: 96 % | TEMPERATURE: 99.6 F | RESPIRATION RATE: 16 BRPM

## 2021-08-19 LAB
EKG ATRIAL RATE: 82 BPM
EKG P AXIS: 59 DEGREES
EKG P-R INTERVAL: 144 MS
EKG Q-T INTERVAL: 388 MS
EKG QRS DURATION: 76 MS
EKG QTC CALCULATION (BAZETT): 453 MS
EKG R AXIS: 38 DEGREES
EKG T AXIS: 70 DEGREES
EKG VENTRICULAR RATE: 82 BPM
TROPONIN: <0.01 NG/ML (ref 0–0.01)

## 2021-08-19 PROCEDURE — 36415 COLL VENOUS BLD VENIPUNCTURE: CPT

## 2021-08-19 PROCEDURE — 6370000000 HC RX 637 (ALT 250 FOR IP): Performed by: EMERGENCY MEDICINE

## 2021-08-19 PROCEDURE — 84484 ASSAY OF TROPONIN QUANT: CPT

## 2021-08-19 PROCEDURE — 93010 ELECTROCARDIOGRAM REPORT: CPT | Performed by: INTERNAL MEDICINE

## 2021-08-19 RX ORDER — ACETAMINOPHEN 500 MG
1000 TABLET ORAL ONCE
Status: COMPLETED | OUTPATIENT
Start: 2021-08-19 | End: 2021-08-19

## 2021-08-19 RX ADMIN — ACETAMINOPHEN 1000 MG: 500 TABLET ORAL at 00:25

## 2021-08-19 RX ADMIN — LIDOCAINE HYDROCHLORIDE: 20 SOLUTION ORAL; TOPICAL at 00:28

## 2021-08-19 ASSESSMENT — PAIN SCALES - GENERAL: PAINLEVEL_OUTOF10: 2

## 2021-08-19 NOTE — ED NOTES
Discharge education reviewed verbally and in writing. Instructed to follow up with PCP and come back to the ED with any new or worsening symptoms. No questions or concerns at this time. No s/s of distress noted at this time.         Melinda Bates RN  08/19/21 [id number]

## 2021-08-19 NOTE — ED PROVIDER NOTES
[address] [facility] ED  eMERGENCY dEPARTMENT eNCOUnter      Pt Name: Yash Leary  MRN: [id number]  [place] 1965  Date of evaluation: 8/18/2021  Provider: Xenia Fish MD    CHIEF COMPLAINT       Chief Complaint   Patient presents with    Chest Pain         HISTORY OF PRESENT ILLNESS   (Location/Symptom, Timing/Onset,Context/Setting, Quality, Duration, Modifying Factors, Severity)  Note limiting factors. Yash Leary is a 55 y.o. female who presents to the emergency department for evaluation of chest pain. Patient describes a 12-hour history of chest discomfort that begins in the epigastric area radiates up to her upper chest.. No associated symptoms such as nausea vomiting or shortness of breath. .  She has no cardiac history and did not take anything for the pain prior to arrival..  No diaphoresis. Cardiac risk factor includes hypertension. She is a non-smoker. She is primarily Czech-speaking all interpretation done through her son who is at the bedside. Chart review regarding her cardiac history patient had a stress test done in 2017 which showed normal ejection fraction and no signs of ischemia. HPI    NursingNotes were reviewed. REVIEW OF SYSTEMS    (2-9 systems for level 4, 10 or more for level 5)     Review of Systems   Constitutional: Negative for chills and diaphoresis. HENT: Negative for congestion, ear pain, mouth sores and sore throat. Eyes: Negative for photophobia and discharge. Respiratory: Negative for cough, chest tightness, shortness of breath and wheezing. Cardiovascular: Negative for chest pain and palpitations. Gastrointestinal: Negative for abdominal distention, abdominal pain, diarrhea, nausea and vomiting. Endocrine: Negative for cold intolerance. Genitourinary: Negative for difficulty urinating and dysuria. Musculoskeletal: Negative for arthralgias and back pain. Skin: Negative for pallor and rash.    Allergic/Immunologic: Negative for immunocompromised state. Neurological: Negative for dizziness and syncope. Hematological: Negative for adenopathy. Psychiatric/Behavioral: Negative for agitation and hallucinations. The patient is not nervous/anxious. All other systems reviewed and are negative. Except as noted above the remainder of the review of systems was reviewed and negative. PAST MEDICAL HISTORY     Past Medical History:   Diagnosis Date    Arthritis     B[facility] disorder ([facility] [address]     DM (diabetes mellitus) ([facility] [address] [facility]     Dyslipidemia     Fibromyalgia     GERD (gastroesophageal reflux disease)     HTN (hypertension)     Hypothyroidism          SURGICALHISTORY       Past Surgical History:   Procedure Laterality Date    CHOLECYSTECTOMY      COLONOSCOPY N/A 9/29/2020    COLONOSCOPY DIAGNOSTIC performed by Christina Diallo MD at [address] [facility] NEEDLE BIOPSY LIVER PERCUTANEOUS  10/28/2020    CT NEEDLE BIOPSY LIVER PERCUTANEOUS 10/28/2020 Liane Mode CT SCAN    HYSTERECTOMY  07/28/2016     [facility]    THYROID SURGERY      UPPER GASTROINTESTINAL ENDOSCOPY N/A 9/29/2020    EGD ESOPHAGOGASTRODUODENOSCOPY with antral biopsies performed by Christina Diallo MD at [facility]       Previous Medications    ACETAMINOPHEN (APAP EXTRA STRENGTH) 500 MG TABLET    Take 2 tablets by mouth every 6 hours as needed for Pain    ATORVASTATIN (LIPITOR) 20 MG TABLET    Take 20 mg by mouth nightly    BACLOFEN (LIORESAL) 5 MG TABLET        CETIRIZINE (ZYRTEC) 10 MG TABLET        DIVALPROEX (DEPAKOTE ER) 500 MG EXTENDED RELEASE TABLET        ESTRADIOL (VIVELLE) 0.05 MG/24HR    APPLY 1 PATCH EXTERNALLY TO THE SKIN 2 TIMES A WEEK    FAMOTIDINE (PEPCID) 20 MG TABLET    TAKE 1 TABLET BY MOUTH TWICE DAILY    FLUOXETINE (PROZAC) 20 MG CAPSULE    Take 1 capsule by mouth daily.     FLUOXETINE (PROZAC) 40 MG CAPSULE        FLUTICASONE (FLONASE) 50 MCG/ACT NASAL SPRAY        GLIPIZIDE (GLUCOTROL) 5 MG TABLET    Take 1 tablet by mouth Daily. HYOSCYAMINE SULFATE SL (LEVSIN/SL) 0.125 MG SUBL    Place 125 mcg under the tongue every 4 hours as needed (pain)    LEVOTHYROXINE (SYNTHROID) 150 MCG TABLET    Take 150 mcg by mouth every morning (before breakfast)    LISINOPRIL (PRINIVIL;ZESTRIL) 10 MG TABLET    Take 1 tablet by mouth daily DO NOT TAKE IF SYSTOLIC BP (TOP NUMBER) IS LESS THAN 100    LORATADINE (CLARITIN) 10 MG TABLET    Take 1 tablet by mouth daily. METFORMIN, MOD, (GLUMETZA) 500 MG EXTENDED RELEASE TABLET    Take 1,000 mg by mouth 2 times daily (with meals)    NAPROXEN (NAPROSYN) 500 MG TABLET    Take 1 tablet by mouth 2 times daily for 5 days    NORTRIPTYLINE (PAMELOR) 50 MG CAPSULE    Take 50 mg by mouth nightly    OMEPRAZOLE (PRILOSEC) 40 MG DELAYED RELEASE CAPSULE    TAKE 1 CAPSULE BY MOUTH DAILY    ONDANSETRON (ZOFRAN) 4 MG TABLET    Take 1 tablet by mouth every 8 hours as needed for Nausea    PEPPERMINT OIL 90 MG CPCR    Take 90 mg by mouth daily    PSYLLIUM (METAMUCIL) 58.6 % POWDER    Take by mouth 3 times daily. QUETIAPINE (SEROQUEL) 100 MG TABLET    1/2 TO 1 TAB Q HS       ALLERGIES     Patient has no known allergies.     FAMILY HISTORY       Family History   Problem Relation Age of Onset    Colon Cancer Brother           SOCIAL HISTORY       Social History     Socioeconomic History    Marital status: Single     Spouse name: Not on file    Number of children: Not on file    Years of education: Not on file    Highest education level: Not on file   Occupational History    Not on file   Tobacco Use    Smoking status: Never Smoker    Smokeless tobacco: Never Used   Vaping Use    Vaping Use: Never used   Substance and Sexual Activity    Alcohol use: No     Alcohol/week: 0.0 standard drinks    Drug use: No    Sexual activity: Not on file   Other Topics Concern    Not on file   Social History Narrative    Not on file     Social Determinants of Health     Financial Resource Strain:     Difficulty of Paying Living Expenses:    Food Insecurity:     Worried About [facility] in the Last Year:     [address] in the Last Year:    Transportation Needs:     Lack of Transportation (Medical):  Lack of Transportation (Non-Medical):    Physical Activity:     Days of Exercise per Week:     Minutes of Exercise per Session:    Stress:     Feeling of Stress :    Social Connections:     Frequency of Communication with Friends and Family:     Frequency of Social Gatherings with Friends and Family:     Attends Scientologist Services:     Active Member of Clubs or Organizations:     Attends Club or Organization Meetings:     Marital Status:    Intimate Partner Violence:     Fear of Current or Ex-Partner:     Emotionally Abused:     Physically Abused:     Sexually Abused:        SCREENINGS      @FLOW([id number])@      PHYSICAL EXAM    (up to 7 for level 4, 8 or more for level 5)     ED Triage Vitals [08/18/21 2153]   BP Temp Temp Source Pulse Resp SpO2 Height Weight   (!) 148/77 99.6 °F (37.6 °C) Oral 83 20 96 % 5' 2\" (1.575 m) 200 lb (90.7 kg)       Physical Exam  Vitals and nursing note reviewed. Constitutional:       Appearance: She is well-developed. HENT:      Head: Normocephalic. Right Ear: Tympanic membrane normal.      Left Ear: Tympanic membrane normal.      Nose: Nose normal.      Mouth/Throat:      Mouth: Mucous membranes are moist.   Eyes:      Conjunctiva/sclera: Conjunctivae normal.      Pupils: Pupils are equal, round, and reactive to light. Cardiovascular:      Rate and Rhythm: Normal rate and regular rhythm. Heart sounds: Normal heart sounds. Pulmonary:      Effort: Pulmonary effort is normal.      Breath sounds: Normal breath sounds. Abdominal:      General: Bowel sounds are normal.      Palpations: Abdomen is soft. Tenderness: There is no abdominal tenderness. There is no guarding. Musculoskeletal:         General: Normal range of motion.       Cervical back: Normal range of motion and neck supple. Skin:     General: Skin is warm and dry. Capillary Refill: Capillary refill takes less than 2 seconds. Neurological:      Mental Status: She is alert and oriented to person, place, and time. Psychiatric:         Mood and Affect: Mood normal.         DIAGNOSTIC RESULTS     EKG: All EKG's are interpreted by the Emergency Department Physician who either signs or Co-signsthis chart in the absence of a cardiologist.    EKG shows normal sinus rhythm rate of 82. No acute ST or T wave changes. Normal axis. Normal EKG. RADIOLOGY:   Non-plain filmimages such as CT, Ultrasound and MRI are read by the radiologist. Plain radiographic images are visualized and preliminarily interpreted by the emergency physician with the below findings:      Interpretation per the Radiologist below, if available at the time ofthis note:    XR CHEST PORTABLE    (Results Pending)         ED BEDSIDE ULTRASOUND:   Performed by ED Physician - none    LABS:  Labs Reviewed   COMPREHENSIVE METABOLIC PANEL - Abnormal; Notable for the following components:       Result Value    Anion Gap 17 (*)     Glucose 236 (*)     ALT 38 (*)     AST 53 (*)     All other components within normal limits   CBC WITH AUTO DIFFERENTIAL - Abnormal; Notable for the following components:    MCHC 32.6 (*)     RDW 14.7 (*)     All other components within normal limits   TROPONIN   TROPONIN       All other labs were within normal range or not returned as of this dictation. EMERGENCY DEPARTMENT COURSE and DIFFERENTIAL DIAGNOSIS/MDM:   Vitals:    Vitals:    08/18/21 2330 08/18/21 2345 08/19/21 0000 08/19/21 0015   BP:  129/73 130/67    Pulse: 79 78 74 73   Resp:  18 18    Temp:       TempSrc:       SpO2: 95% 97% 96% 97%   Weight:       Height:              MDM on recheck patient is symptom-free after the GI cocktail. No relief from the nitroglycerin.   She had serial troponins done and remain negative despite having the pain all day.  Discharged home follow-up with family doctor and also repeat follow-up with cardiology Dr. Lakesha Lara    CONSULTS:  None    PROCEDURES:  Unless otherwise noted below, none     Procedures    FINAL IMPRESSION      1.  Chest pain, unspecified type          DISPOSITION/PLAN   DISPOSITION Decision To Discharge 08/19/2021 01:37:55 AM      PATIENT REFERRED TO:  Kenya Stoll DO  [address] [facility] [address]  [phone]    In 2 days      Maikel Stock Gunner DominicDO  [address] [address] [phone]    In 1 week        DISCHARGE MEDICATIONS:  New Prescriptions    No medications on file          (Please note that portions of this note were completed with a voice recognition program.  Efforts were made to edit the dictations but occasionally words are mis-transcribed.)    Kane Campuzano MD (electronically signed)  Attending Emergency Physician          Kane Campuzano MD  08/19/21 0368

## 2021-08-23 ENCOUNTER — APPOINTMENT (OUTPATIENT)
Dept: ULTRASOUND IMAGING | Age: 56
End: 2021-08-23
Payer: MEDICAID

## 2021-08-23 ENCOUNTER — HOSPITAL ENCOUNTER (EMERGENCY)
Age: 56
Discharge: HOME | End: 2021-08-23
Attending: EMERGENCY MEDICINE
Payer: MEDICAID

## 2021-08-23 VITALS
TEMPERATURE: 96.8 F | WEIGHT: 200 LBS | DIASTOLIC BLOOD PRESSURE: 74 MMHG | HEIGHT: 60 IN | BODY MASS INDEX: 39.27 KG/M2 | RESPIRATION RATE: 18 BRPM | OXYGEN SATURATION: 98 % | HEART RATE: 79 BPM | SYSTOLIC BLOOD PRESSURE: 123 MMHG

## 2021-08-23 DIAGNOSIS — R10.10 PAIN OF UPPER ABDOMEN: Primary | ICD-10-CM

## 2021-08-23 LAB
ALBUMIN SERPL-MCNC: 3.8 G/DL (ref 3.5–4.6)
ALP BLD-CCNC: 95 U/L (ref 40–130)
ALT SERPL-CCNC: 26 U/L (ref 0–33)
AMYLASE: 69 U/L (ref 22–93)
ANION GAP SERPL CALCULATED.3IONS-SCNC: 10 MEQ/L (ref 9–15)
AST SERPL-CCNC: 30 U/L (ref 0–35)
BILIRUB SERPL-MCNC: <0.2 MG/DL (ref 0.2–0.7)
BILIRUBIN URINE: NEGATIVE
BLOOD, URINE: NEGATIVE
BUN BLDV-MCNC: 12 MG/DL (ref 6–20)
CALCIUM SERPL-MCNC: 9.1 MG/DL (ref 8.5–9.9)
CHLORIDE BLD-SCNC: 99 MEQ/L (ref 95–107)
CLARITY: CLEAR
CO2: 29 MEQ/L (ref 20–31)
COLOR: YELLOW
CREAT SERPL-MCNC: 0.65 MG/DL (ref 0.5–0.9)
ETHANOL PERCENT: NORMAL G/DL
ETHANOL: <10 MG/DL (ref 0–0.08)
GFR AFRICAN AMERICAN: >60
GFR NON-AFRICAN AMERICAN: >60
GLOBULIN: 2.9 G/DL (ref 2.3–3.5)
GLUCOSE BLD-MCNC: 215 MG/DL (ref 70–99)
GLUCOSE URINE: >=1000 MG/DL
HCT VFR BLD CALC: 40.5 % (ref 37–47)
HEMOGLOBIN: 13.3 G/DL (ref 12–16)
KETONES, URINE: 15 MG/DL
LEUKOCYTE ESTERASE, URINE: NEGATIVE
LIPASE: 40 U/L (ref 12–95)
MCH RBC QN AUTO: 28.9 PG (ref 27–31.3)
MCHC RBC AUTO-ENTMCNC: 32.9 % (ref 33–37)
MCV RBC AUTO: 87.9 FL (ref 82–100)
NITRITE, URINE: NEGATIVE
PDW BLD-RTO: 14.5 % (ref 11.5–14.5)
PH UA: 6 (ref 5–9)
PLATELET # BLD: 225 K/UL (ref 130–400)
POTASSIUM SERPL-SCNC: 4.7 MEQ/L (ref 3.4–4.9)
PROTEIN UA: NEGATIVE MG/DL
RBC # BLD: 4.61 M/UL (ref 4.2–5.4)
SODIUM BLD-SCNC: 138 MEQ/L (ref 135–144)
SPECIFIC GRAVITY UA: 1.04 (ref 1–1.03)
TOTAL PROTEIN: 6.7 G/DL (ref 6.3–8)
URINE REFLEX TO CULTURE: ABNORMAL
UROBILINOGEN, URINE: 1 E.U./DL
WBC # BLD: 7 K/UL (ref 4.8–10.8)

## 2021-08-23 PROCEDURE — 76705 ECHO EXAM OF ABDOMEN: CPT

## 2021-08-23 PROCEDURE — 85027 COMPLETE CBC AUTOMATED: CPT

## 2021-08-23 PROCEDURE — 82150 ASSAY OF AMYLASE: CPT

## 2021-08-23 PROCEDURE — 80053 COMPREHEN METABOLIC PANEL: CPT

## 2021-08-23 PROCEDURE — 2580000003 HC RX 258: Performed by: EMERGENCY MEDICINE

## 2021-08-23 PROCEDURE — 99284 EMERGENCY DEPT VISIT MOD MDM: CPT

## 2021-08-23 PROCEDURE — 83690 ASSAY OF LIPASE: CPT

## 2021-08-23 PROCEDURE — 82077 ASSAY SPEC XCP UR&BREATH IA: CPT

## 2021-08-23 PROCEDURE — 36415 COLL VENOUS BLD VENIPUNCTURE: CPT

## 2021-08-23 PROCEDURE — 81003 URINALYSIS AUTO W/O SCOPE: CPT

## 2021-08-23 RX ORDER — 0.9 % SODIUM CHLORIDE 0.9 %
500 INTRAVENOUS SOLUTION INTRAVENOUS ONCE
Status: COMPLETED | OUTPATIENT
Start: 2021-08-23 | End: 2021-08-23

## 2021-08-23 RX ORDER — ONDANSETRON 4 MG/1
4 TABLET, FILM COATED ORAL EVERY 8 HOURS PRN
Qty: 20 TABLET | Refills: 0 | Status: SHIPPED | OUTPATIENT
Start: 2021-08-23

## 2021-08-23 RX ADMIN — SODIUM CHLORIDE 500 ML: 9 INJECTION, SOLUTION INTRAVENOUS at 09:20

## 2021-08-23 ASSESSMENT — ENCOUNTER SYMPTOMS
FACIAL SWELLING: 0
RHINORRHEA: 0
DIARRHEA: 1
WHEEZING: 0
ABDOMINAL PAIN: 1
ABDOMINAL DISTENTION: 0
NAUSEA: 1
COLOR CHANGE: 0
SHORTNESS OF BREATH: 0
VOMITING: 1
PHOTOPHOBIA: 0
EYE DISCHARGE: 0

## 2021-08-23 ASSESSMENT — PAIN DESCRIPTION - PAIN TYPE: TYPE: ACUTE PAIN

## 2021-08-23 ASSESSMENT — PAIN DESCRIPTION - FREQUENCY: FREQUENCY: CONTINUOUS

## 2021-08-23 ASSESSMENT — PAIN DESCRIPTION - DESCRIPTORS: DESCRIPTORS: PATIENT UNABLE TO DESCRIBE

## 2021-08-23 ASSESSMENT — PAIN DESCRIPTION - ORIENTATION: ORIENTATION: RIGHT;LOWER

## 2021-08-23 ASSESSMENT — PAIN DESCRIPTION - LOCATION: LOCATION: ABDOMEN

## 2021-08-23 ASSESSMENT — PAIN SCALES - GENERAL: PAINLEVEL_OUTOF10: 10

## 2021-08-23 NOTE — ED TRIAGE NOTES
Pt presents to ED from home with c/o RLQ abdominal pain. Pt states that abd pain is associated with n/v/d. Upon assessment, pt is A/Ox4, skin appropriate for ethnicity/w/d, resp even and unlabored, msp's intact. Pt denies sob, cp, fever, and chills.

## 2021-08-23 NOTE — ED PROVIDER NOTES
the review of systems was reviewed and negative. PAST MEDICAL HISTORY     Past Medical History:   Diagnosis Date    Arthritis     [facility] [address]     DM (diabetes mellitus) ([facility] [address] [facility]     Dyslipidemia     Fibromyalgia     GERD (gastroesophageal reflux disease)     HTN (hypertension)     Hypothyroidism          SURGICALHISTORY       Past Surgical History:   Procedure Laterality Date    CHOLECYSTECTOMY      COLONOSCOPY N/A 9/29/2020    COLONOSCOPY DIAGNOSTIC performed by Renata Whittaker MD at [address] [facility] NEEDLE BIOPSY LIVER PERCUTANEOUS  10/28/2020    CT NEEDLE BIOPSY LIVER PERCUTANEOUS 10/28/2020 [facility] CT SCAN    HYSTERECTOMY  07/28/2016    [facility]    THYROID SURGERY      UPPER GASTROINTESTINAL ENDOSCOPY N/A 9/29/2020    EGD ESOPHAGOGASTRODUODENOSCOPY with antral biopsies performed by Renata Whittaker MD at [address] [place] [address]       Current Discharge Medication List      CONTINUE these medications which have NOT CHANGED    Details   Peppermint Oil 90 MG CPCR Take 90 mg by mouth daily  Qty: 30 capsule, Refills: 0      Hyoscyamine Sulfate SL (LEVSIN/SL) 0.125 MG SUBL Place 125 mcg under the tongue every 4 hours as needed (pain)  Qty: 120 each, Refills: 1      naproxen (NAPROSYN) 500 MG tablet Take 1 tablet by mouth 2 times daily for 5 days  Qty: 10 tablet, Refills: 0      omeprazole (PRILOSEC) 40 MG delayed release capsule TAKE 1 CAPSULE BY MOUTH DAILY  Qty: 30 capsule, Refills: 3      famotidine (PEPCID) 20 MG tablet TAKE 1 TABLET BY MOUTH TWICE DAILY  Qty: 60 tablet, Refills: 3      estradiol (VIVELLE) 0.05 MG/24HR APPLY 1 PATCH EXTERNALLY TO THE SKIN 2 TIMES A WEEK  Qty: 8 patch, Refills: 11      fluticasone (FLONASE) 50 MCG/ACT nasal spray       cetirizine (ZYRTEC) 10 MG tablet       !!  FLUoxetine (PROZAC) 40 MG capsule       divalproex (DEPAKOTE ER) 500 MG extended release tablet       Baclofen (LIORESAL) 5 MG tablet       nortriptyline (PAMELOR) 50 MG capsule Take 50 mg by mouth nightly      psyllium (METAMUCIL) 58.6 % powder Take by mouth 3 times daily. Qty: 1 Bottle, Refills: 5      acetaminophen (APAP EXTRA STRENGTH) 500 MG tablet Take 2 tablets by mouth every 6 hours as needed for Pain  Qty: 30 tablet, Refills: 0      atorvastatin (LIPITOR) 20 MG tablet Take 20 mg by mouth nightly      lisinopril (PRINIVIL;ZESTRIL) 10 MG tablet Take 1 tablet by mouth daily DO NOT TAKE IF SYSTOLIC BP (TOP NUMBER) IS LESS THAN 100  Qty: 30 tablet, Refills: 2      metFORMIN, MOD, (GLUMETZA) 500 MG extended release tablet Take 1,000 mg by mouth 2 times daily (with meals)      levothyroxine (SYNTHROID) 150 MCG tablet Take 150 mcg by mouth every morning (before breakfast)      glipiZIDE (GLUCOTROL) 5 MG tablet Take 1 tablet by mouth Daily. Qty: 90 tablet, Refills: 3      loratadine (CLARITIN) 10 MG tablet Take 1 tablet by mouth daily. Qty: 30 tablet, Refills: 2      QUEtiapine (SEROQUEL) 100 MG tablet 1/2 TO 1 TAB Q HS  Qty: 30 tablet, Refills: 2      !! FLUoxetine (PROZAC) 20 MG capsule Take 1 capsule by mouth daily. Qty: 30 capsule, Refills: 2       !! - Potential duplicate medications found. Please discuss with provider. ALLERGIES     Patient has no known allergies.     FAMILY HISTORY       Family History   Problem Relation Age of Onset    Colon Cancer Brother           SOCIAL HISTORY       Social History     Socioeconomic History    Marital status: Single     Spouse name: None    Number of children: None    Years of education: None    Highest education level: None   Occupational History    None   Tobacco Use    Smoking status: Never Smoker    Smokeless tobacco: Never Used   Vaping Use    Vaping Use: Never used   Substance and Sexual Activity    Alcohol use: No     Alcohol/week: 0.0 standard drinks    Drug use: No    Sexual activity: None   Other Topics Concern    None   Social History Narrative    None     Social Determinants of Health     Financial Resource Strain:     Difficulty of Paying Living Expenses:    Food Insecurity:     Worried About Running Out of Food in the Last Year:     [address] in the Last Year:    Transportation Needs:     Lack of Transportation (Medical):  Lack of Transportation (Non-Medical):    Physical Activity:     Days of Exercise per Week:     Minutes of Exercise per Session:    Stress:     Feeling of Stress :    Social Connections:     Frequency of Communication with Friends and Family:     Frequency of Social Gatherings with Friends and Family:     Attends Temple Services:     Active Member of Clubs or Organizations:     Attends Club or Organization Meetings:     Marital Status:    Intimate Partner Violence:     Fear of Current or Ex-Partner:     Emotionally Abused:     Physically Abused:     Sexually Abused:        SCREENINGS    Farzad Coma Scale  Eye Opening: Spontaneous  Best Verbal Response: Oriented  Best Motor Response: Obeys commands  Hominy Coma Scale Score: 15 @FLOW([id number])@      PHYSICAL EXAM    (up to 7 for level 4, 8 or more for level 5)     ED Triage Vitals [08/23/21 0803]   BP Temp Temp Source Pulse Resp SpO2 Height Weight   (!) 142/84 96.8 °F (36 °C) Temporal 77 17 99 % 5' (1.524 m) 200 lb (90.7 kg)       Physical Exam  Constitutional:       General: She is in acute distress. Appearance: She is well-developed. She is ill-appearing. She is not toxic-appearing or diaphoretic. HENT:      Head: Normocephalic and atraumatic. Eyes:      Conjunctiva/sclera: Conjunctivae normal.      Pupils: Pupils are equal, round, and reactive to light. Cardiovascular:      Rate and Rhythm: Normal rate. Pulmonary:      Effort: Pulmonary effort is normal.   Abdominal:      General: Abdomen is flat. Bowel sounds are normal. There is no distension. Palpations: Abdomen is soft. There is no hepatomegaly. Tenderness: There is abdominal tenderness in the right upper quadrant and epigastric area.  There is no right CVA tenderness, left CVA tenderness, guarding or rebound. Hernia: No hernia is present. Genitourinary:     Vagina: Normal.   Musculoskeletal:         General: Normal range of motion. Cervical back: Normal range of motion and neck supple. Skin:     General: Skin is warm and dry. Coloration: Skin is not jaundiced. Findings: No rash. Neurological:      Mental Status: She is alert and oriented to person, place, and time. Motor: No weakness. Deep Tendon Reflexes: Reflexes are normal and symmetric. Psychiatric:         Mood and Affect: Mood is not anxious. DIAGNOSTIC RESULTS     EKG: All EKG's are interpreted by the Emergency Department Physician who either signs or Co-signsthis chart in the absence of a cardiologist.    Normal sinus rhythm at 65 bpm with no acute ST segment elevation or T wave inversion    RADIOLOGY:   Non-plain filmimages such as CT, Ultrasound and MRI are read by the radiologist. Plain radiographic images are visualized and preliminarily interpreted by the emergency physician with the below findings:        Interpretation per the Radiologist below, if available at the time ofthis note:    [address] OTHERWISE, NEGATIVE LIMITED RIGHT UPPER QUADRANT ULTRASOUND AFTER PREVIOUS CHOLECYSTECTOMY.                      ED BEDSIDE ULTRASOUND:   Performed by ED Physician - none    LABS:  Labs Reviewed   CBC - Abnormal; Notable for the following components:       Result Value    MCHC 32.9 (*)     All other components within normal limits   COMPREHENSIVE METABOLIC PANEL - Abnormal; Notable for the following components:    Glucose 215 (*)     All other components within normal limits   URINE RT REFLEX TO CULTURE - Abnormal; Notable for the following components:    Glucose, Ur >=1000 (*)     Ketones, Urine 15 (*)     All other components within normal limits   LIPASE   AMYLASE   ETHANOL       All other labs were within normal range or not returned as of this dictation. EMERGENCY DEPARTMENT COURSE and DIFFERENTIAL DIAGNOSIS/MDM:   Vitals:    Vitals:    08/23/21 0803 08/23/21 1041   BP: (!) 142/84 123/74   Pulse: 77 79   Resp: 17 18   Temp: 96.8 °F (36 °C)    TempSrc: Temporal    SpO2: 99% 98%   Weight: 200 lb (90.7 kg)    Height: 5' (1.524 m)        Patient did not recall that her gallbladder had been removed. She had denied pain similar to this prior. She really does not have any tenderness directly over her gallbladder on exam however it seems to be in this upper quadrant. Ultrasound is ordered and her gallbladder is surgically absent and her liver shows some fatty infiltration. Her lab work is otherwise normal except for some sugar in her urine. Patient is comfortable and has not vomited here. She is agreeable to discharge. Signs and symptoms which should prompt her urgent return to the emergency department are reviewed with her as well as given to her and written instructions and she states her understanding of these at time of disposition. She has nausea medicine called into the pharmacy for her as it seems on repeat conversation that nausea is her primary complaint over pain. MDM    CRITICAL CARE TIME   Total Critical Care time was 0 minutes, excluding separately reportableprocedures. There was a high probability of clinicallysignificant/life threatening deterioration in the patient's condition which required my urgent intervention. CONSULTS:  None    PROCEDURES:  Unless otherwise noted below, none     Procedures    FINAL IMPRESSION      1.  Pain of upper abdomen          DISPOSITION/PLAN   DISPOSITION Decision To Discharge 08/23/2021 11:07:02 AM      PATIENT REFERRED TO:  Kassy Benavides DO  [address] [place] [id number] [phone]      As needed      DISCHARGE MEDICATIONS:  Current Discharge Medication List             (Please note that portions of this note were completed with a voice recognition program.  Efforts were made to edit the dictations but occasionally words are mis-transcribed.)    Ernie Mcguire DO (electronically signed)  Attending Emergency Physician          Ernie Mcguire DO  08/23/21 0236

## 2021-10-12 RX ORDER — FAMOTIDINE 20 MG/1
TABLET, FILM COATED ORAL
Qty: 60 TABLET | Refills: 3 | Status: SHIPPED | OUTPATIENT
Start: 2021-10-12 | End: 2021-10-26

## 2021-10-12 RX ORDER — OMEPRAZOLE 40 MG/1
40 CAPSULE, DELAYED RELEASE ORAL DAILY
Qty: 30 CAPSULE | Refills: 3 | Status: SHIPPED | OUTPATIENT
Start: 2021-10-12 | End: 2022-02-25

## 2021-10-26 ENCOUNTER — OFFICE VISIT (OUTPATIENT)
Dept: GASTROENTEROLOGY | Age: 56
End: 2021-10-26
Payer: MEDICAID

## 2021-10-26 VITALS — BODY MASS INDEX: 37.89 KG/M2 | WEIGHT: 193 LBS | OXYGEN SATURATION: 100 % | HEIGHT: 60 IN | HEART RATE: 74 BPM

## 2021-10-26 DIAGNOSIS — R19.7 INTERMITTENT DIARRHEA: ICD-10-CM

## 2021-10-26 DIAGNOSIS — R11.2 NON-INTRACTABLE VOMITING WITH NAUSEA, UNSPECIFIED VOMITING TYPE: ICD-10-CM

## 2021-10-26 DIAGNOSIS — R10.11 RIGHT UPPER QUADRANT ABDOMINAL PAIN: ICD-10-CM

## 2021-10-26 DIAGNOSIS — K75.81 NASH (NONALCOHOLIC STEATOHEPATITIS): Primary | ICD-10-CM

## 2021-10-26 DIAGNOSIS — E11.9 TYPE 2 DIABETES MELLITUS WITHOUT COMPLICATION, WITHOUT LONG-TERM CURRENT USE OF INSULIN (HCC): ICD-10-CM

## 2021-10-26 DIAGNOSIS — K21.9 GASTROESOPHAGEAL REFLUX DISEASE WITHOUT ESOPHAGITIS: ICD-10-CM

## 2021-10-26 PROCEDURE — G8427 DOCREV CUR MEDS BY ELIG CLIN: HCPCS | Performed by: NURSE PRACTITIONER

## 2021-10-26 PROCEDURE — 3046F HEMOGLOBIN A1C LEVEL >9.0%: CPT | Performed by: NURSE PRACTITIONER

## 2021-10-26 PROCEDURE — 99214 OFFICE O/P EST MOD 30 MIN: CPT | Performed by: NURSE PRACTITIONER

## 2021-10-26 PROCEDURE — G8417 CALC BMI ABV UP PARAM F/U: HCPCS | Performed by: NURSE PRACTITIONER

## 2021-10-26 PROCEDURE — 2022F DILAT RTA XM EVC RTNOPTHY: CPT | Performed by: NURSE PRACTITIONER

## 2021-10-26 PROCEDURE — 3017F COLORECTAL CA SCREEN DOC REV: CPT | Performed by: NURSE PRACTITIONER

## 2021-10-26 PROCEDURE — 1036F TOBACCO NON-USER: CPT | Performed by: NURSE PRACTITIONER

## 2021-10-26 PROCEDURE — G8484 FLU IMMUNIZE NO ADMIN: HCPCS | Performed by: NURSE PRACTITIONER

## 2021-10-26 NOTE — PROGRESS NOTES
Gastroenterology Clinic Follow up Visit    Nasra Carmona  [id number]  Chief Complaint   Patient presents with    Follow-up     *Of note,  services was used during today's visit. *    HPI: 55 y.o. female following up after last GI clinic on 7/14/2021. Interval change: Patient presents to GI clinic for follow-up of CARBAJAL and GERD. Patient reports GERD symptoms are well controlled with omeprazole 40 mg daily. She denies breakthrough symptoms as long as she takes her medication every day. Patient continues to report intermittent right upper quadrant abdominal pain that occurs around 4 times per week, states she was taking Levsin with moderate relief of her symptoms. However, she states it is very expensive and she cannot afford it. States she has had nausea/vomiting and occasional diarrhea when she has this pain. States she will vomit around 1-2 times per week. She also reports RLQ pain (feels like a \"twisting\" pain) that began the week after she was in the GI clinic in July of this year. States it has occurred about 3 times since then. She was in the ER for this pain on August 23, 2021, with negative lab work and RUQ ultrasound revealing surgically absent gallbladder and hepatic steatosis. She endorses being told her appendix was inflamed 6 years ago, but did not have surgery/removal, history unclear. She denies and fevers or chills. She denies hematemesis, dysphagia, hematochezia, melena or unintentional weight loss. States she has seen a nutritionist through [place] [facility] that was arranged by her PCP. She endorses drinking more water and is following with her nutritionist recommended. Upon review of medical record, patient has lost approximately 7 pounds since her previous visit at the GI clinic. HPI from last GI clinic visit on 7/14/2021  summarized below:  Patient following up in the GI clinic for history of CARBAJAL and GERD.  Patient denies GERD symptoms, states she takes Prilosec 40mg other systems reviewed and are negative. Past medical history, past surgical history, medication list, social and familyhistory reviewed    Pulse 74, height 5' (1.524 m), weight 193 lb (87.5 kg), last menstrual period 03/27/2016, SpO2 100 %, not currently breastfeeding. Physical Exam  Constitutional:       General: She is not in acute distress. Appearance: Normal appearance. She is normal weight. She is not ill-appearing. HENT:      Head: Normocephalic and atraumatic. Eyes:      General: No scleral icterus. Cardiovascular:      Rate and Rhythm: Normal rate and regular rhythm. Pulses: Normal pulses. Pulmonary:      Effort: Pulmonary effort is normal. No respiratory distress. Breath sounds: Normal breath sounds. Abdominal:      General: Abdomen is protuberant. Bowel sounds are normal. There is no distension. Palpations: Abdomen is soft. There is no mass. Tenderness: There is abdominal tenderness (mild) in the right upper quadrant and right lower quadrant. There is no guarding or rebound. Comments: Central obesity   Musculoskeletal:         General: Normal range of motion. Lymphadenopathy:      Cervical: No cervical adenopathy. Neurological:      Mental Status: She is alert and oriented to person, place, and time. Psychiatric:         Mood and Affect: Mood normal.         Behavior: Behavior normal.         Thought Content: Thought content normal.         Judgment: Judgment normal.       Laboratory, Pathology, Radiology reviewed in detail with relevantimportant investigations summarized below:    No results for input(s): WBC, HGB, HCT, MCV, PLT in the last 720 hours.   Lab Results   Component Value Date    WBC 7.0 08/23/2021    HGB 13.3 08/23/2021    HCT 40.5 08/23/2021    MCV 87.9 08/23/2021     08/23/2021     Lab Results   Component Value Date    ALT 26 08/23/2021    AST 30 08/23/2021    ALKPHOS 95 08/23/2021    BILITOT <0.2 08/23/2021     Hemoglobin A1c 9/3/2021: 9.3%    Right upper quadrant abdominal ultrasound 8/23/2021: Gallbladder surgically absent. Mild to moderate increased echogenicity of the normal-sized liver parenchyma is consistent with fatty infiltration. Common duct measures approximately 2 to 3 mm at the rosa hepatis. Assessment and Plan:  Pam Pike 55 y.o. female with ongoing constellation of upper and lower GI symptoms including: nausea/vomiting, intermittent RUQ/RLQ pain, occasional diarrhea. 1. CARBAJAL (nonalcoholic steatohepatitis)  -S/p liver biopsy 10/28/2020 revealing mild chronic hepatitis with macrovesicular steatosis, histologic findings consistent with CARBAJAL. Previous lab work to rule out viral, autoimmune or metabolic etiology essentially negative, other than positive hepatitis B core antibody and reactive surface antibody, indicating previous hepatitis B infection with natural clearance of the virus.  - Patient with intentional weight loss since meeting with nutritionist at [facility]. Her goal is to lose 10 more pounds by her next follow-up appointment.  -Will recheck hepatic function panel. 2. Type 2 diabetes mellitus without complication, without long-term current use of insulin ([address]  3. Non-intractable vomiting with nausea, unspecified vomiting type  -Patient with nausea/vomiting in the setting of type 2 diabetes (recent hemoglobin A1c on 9/3/21 reveals continual glucose elevation at 9.3%, improved from previous result of 10.8%). Suspect possible gastroparesis. Will request gastric emptying study. 4. Gastroesophageal reflux disease without esophagitis  -Patient without heartburn or regurgitation while on PPI therapy. - Continue omeprazole 40 mg daily  - Advised on the correct dose and timing of the PPI, Preferably 1/2 hour before breakfast. If symptoms are worse at night would recommend taking it half an hour before dinner.  - Pathophysiology and etiology of reflux discussed at length, with help of images.   -

## 2021-11-09 ENCOUNTER — HOSPITAL ENCOUNTER (OUTPATIENT)
Dept: NUCLEAR MEDICINE | Age: 56
Discharge: HOME | End: 2021-11-11
Payer: MEDICAID

## 2021-11-09 DIAGNOSIS — R11.2 NON-INTRACTABLE VOMITING WITH NAUSEA, UNSPECIFIED VOMITING TYPE: ICD-10-CM

## 2021-11-09 DIAGNOSIS — E11.9 TYPE 2 DIABETES MELLITUS WITHOUT COMPLICATION, WITHOUT LONG-TERM CURRENT USE OF INSULIN (HCC): ICD-10-CM

## 2021-11-09 PROCEDURE — 78264 GASTRIC EMPTYING IMG STUDY: CPT

## 2021-11-09 PROCEDURE — 3430000000 HC RX DIAGNOSTIC RADIOPHARMACEUTICAL: Performed by: NURSE PRACTITIONER

## 2021-11-09 PROCEDURE — A9541 TC99M SULFUR COLLOID: HCPCS | Performed by: NURSE PRACTITIONER

## 2021-11-09 RX ADMIN — Medication 1.9 MILLICURIE: at 07:28

## 2021-11-17 ENCOUNTER — TELEPHONE (OUTPATIENT)
Dept: GASTROENTEROLOGY | Age: 56
End: 2021-11-17

## 2021-11-19 NOTE — TELEPHONE ENCOUNTER
Sonja, please call patient and ask what lab work she is speaking about. I ordered a hepatic function panel at our last visit, however that does not appear to be completed yet. We reviewed all of her other lab work at her previous follow-up visit. It appears she had a previous hepatitis B infection and her body naturally cleared it. Her gastric emptying study was negative for gastroparesis. Thank you.

## 2022-02-25 RX ORDER — OMEPRAZOLE 40 MG/1
40 CAPSULE, DELAYED RELEASE ORAL DAILY
Qty: 30 CAPSULE | Refills: 3 | Status: SHIPPED | OUTPATIENT
Start: 2022-02-25 | End: 2022-07-06

## 2022-03-09 RX ORDER — FAMOTIDINE 20 MG/1
TABLET, FILM COATED ORAL
Qty: 60 TABLET | Refills: 3 | OUTPATIENT
Start: 2022-03-09

## 2022-04-17 ENCOUNTER — HOSPITAL ENCOUNTER (EMERGENCY)
Age: 57
Discharge: HOME | End: 2022-04-17
Attending: FAMILY MEDICINE
Payer: MEDICAID

## 2022-04-17 ENCOUNTER — APPOINTMENT (OUTPATIENT)
Dept: GENERAL RADIOLOGY | Age: 57
End: 2022-04-17
Payer: MEDICAID

## 2022-04-17 VITALS
BODY MASS INDEX: 37.11 KG/M2 | SYSTOLIC BLOOD PRESSURE: 155 MMHG | RESPIRATION RATE: 18 BRPM | TEMPERATURE: 98.5 F | OXYGEN SATURATION: 98 % | WEIGHT: 190 LBS | DIASTOLIC BLOOD PRESSURE: 90 MMHG | HEART RATE: 80 BPM

## 2022-04-17 DIAGNOSIS — J20.9 ACUTE BRONCHITIS WITH BRONCHOSPASM: Primary | ICD-10-CM

## 2022-04-17 LAB
INFLUENZA A BY PCR: NEGATIVE
INFLUENZA B BY PCR: NEGATIVE
SARS-COV-2, NAAT: NOT DETECTED

## 2022-04-17 PROCEDURE — 96372 THER/PROPH/DIAG INJ SC/IM: CPT

## 2022-04-17 PROCEDURE — 94640 AIRWAY INHALATION TREATMENT: CPT

## 2022-04-17 PROCEDURE — 94761 N-INVAS EAR/PLS OXIMETRY MLT: CPT

## 2022-04-17 PROCEDURE — 71045 X-RAY EXAM CHEST 1 VIEW: CPT

## 2022-04-17 PROCEDURE — 99284 EMERGENCY DEPT VISIT MOD MDM: CPT

## 2022-04-17 PROCEDURE — 6360000002 HC RX W HCPCS: Performed by: FAMILY MEDICINE

## 2022-04-17 PROCEDURE — 87502 INFLUENZA DNA AMP PROBE: CPT

## 2022-04-17 PROCEDURE — 6370000000 HC RX 637 (ALT 250 FOR IP): Performed by: FAMILY MEDICINE

## 2022-04-17 PROCEDURE — 87635 SARS-COV-2 COVID-19 AMP PRB: CPT

## 2022-04-17 RX ORDER — PREDNISONE 20 MG/1
20 TABLET ORAL 2 TIMES DAILY
Qty: 10 TABLET | Refills: 0 | Status: SHIPPED | OUTPATIENT
Start: 2022-04-17 | End: 2022-04-22

## 2022-04-17 RX ORDER — METHYLPREDNISOLONE SODIUM SUCCINATE 125 MG/2ML
125 INJECTION, POWDER, LYOPHILIZED, FOR SOLUTION INTRAMUSCULAR; INTRAVENOUS ONCE
Status: COMPLETED | OUTPATIENT
Start: 2022-04-17 | End: 2022-04-17

## 2022-04-17 RX ORDER — IPRATROPIUM BROMIDE AND ALBUTEROL SULFATE 2.5; .5 MG/3ML; MG/3ML
1 SOLUTION RESPIRATORY (INHALATION) ONCE
Status: COMPLETED | OUTPATIENT
Start: 2022-04-17 | End: 2022-04-17

## 2022-04-17 RX ORDER — ALBUTEROL SULFATE 90 UG/1
2 AEROSOL, METERED RESPIRATORY (INHALATION) EVERY 6 HOURS PRN
COMMUNITY

## 2022-04-17 RX ADMIN — METHYLPREDNISOLONE SODIUM SUCCINATE 125 MG: 125 INJECTION, POWDER, FOR SOLUTION INTRAMUSCULAR; INTRAVENOUS at 15:03

## 2022-04-17 RX ADMIN — IPRATROPIUM BROMIDE AND ALBUTEROL SULFATE 1 AMPULE: .5; 2.5 SOLUTION RESPIRATORY (INHALATION) at 15:03

## 2022-04-17 ASSESSMENT — ENCOUNTER SYMPTOMS
ABDOMINAL PAIN: 0
COUGH: 1
DIARRHEA: 0
SORE THROAT: 1
WHEEZING: 1
NAUSEA: 0
VOMITING: 0

## 2022-04-17 ASSESSMENT — PAIN DESCRIPTION - DESCRIPTORS: DESCRIPTORS: ACHING

## 2022-04-17 ASSESSMENT — PAIN - FUNCTIONAL ASSESSMENT: PAIN_FUNCTIONAL_ASSESSMENT: 0-10

## 2022-04-17 ASSESSMENT — PAIN DESCRIPTION - LOCATION: LOCATION: HEAD

## 2022-04-17 ASSESSMENT — PAIN SCALES - GENERAL: PAINLEVEL_OUTOF10: 5

## 2022-04-17 ASSESSMENT — PAIN DESCRIPTION - PAIN TYPE: TYPE: ACUTE PAIN

## 2022-04-17 NOTE — ED PROVIDER NOTES
[address] [facility]  eMERGENCY dEPARTMENT eNCOUnter      Pt Name: Mayra Youngblood  MRN: [id number]  L[place]fjosé 1965  Date of evaluation: 4/17/2022  Provider: Flaco Giang MD    CHIEF COMPLAINT       Chief Complaint   Patient presents with    Illness     cough, congestion, headache         HISTORY OF PRESENT ILLNESS   (Location/Symptom, Timing/Onset,Context/Setting, Quality, Duration, Modifying Factors, Severity)  Note limiting factors. Mayra Youngblood is a 55 y.o. female who presents to the emergency department        The history is provided by the patient. Illness   The current episode started 5 to 7 days ago. The onset was gradual. The problem has been unchanged. The problem is moderate. Relieved by: inhaler  Associated symptoms include congestion, sore throat, cough and wheezing. Pertinent negatives include no fever, no abdominal pain, no diarrhea, no nausea, no vomiting, no muscle aches, no neck pain, no neck stiffness, no rash and no diaper rash. The cough has no precipitants. The cough is productive. The cough is relieved by beta-agonist inhalers. The cough is worsened by activity. NursingNotes were reviewed. REVIEW OF SYSTEMS    (2-9 systems for level 4, 10 or more for level 5)     Review of Systems   Constitutional: Negative for fever. HENT: Positive for congestion and sore throat. Respiratory: Positive for cough and wheezing. Gastrointestinal: Negative for abdominal pain, diarrhea, nausea and vomiting. Musculoskeletal: Negative for neck pain. Skin: Negative for rash. Except as noted above the remainder of the review of systems was reviewed and negative.        PAST MEDICAL HISTORY     Past Medical History:   Diagnosis Date    Arthritis     B[facility] disorder [facility]     DM (diabetes mellitus) ([facility]     Dyslipidemia     Fibromyalgia     GERD (gastroesophageal reflux disease)     HTN (hypertension)     Hypothyroidism          SURGICALHISTORY       Past Surgical History: Procedure Laterality Date    CHOLECYSTECTOMY      COLONOSCOPY N/A 9/29/2020    COLONOSCOPY DIAGNOSTIC performed by Yunior Morales MD at [address] [facility] NEEDLE BIOPSY LIVER PERCUTANEOUS  10/28/2020    CT NEEDLE BIOPSY LIVER PERCUTANEOUS 10/28/2020 [facility] CT SCAN    HYSTERECTOMY  07/28/2016    DR Gerson Melgar    THYROID SURGERY      UPPER GASTROINTESTINAL ENDOSCOPY N/A 9/29/2020    EGD ESOPHAGOGASTRODUODENOSCOPY with antral biopsies performed by Yunior Morales MD at [facility]       Discharge Medication List as of 4/17/2022  4:14 PM      CONTINUE these medications which have NOT CHANGED    Details   albuterol sulfate HFA (VENTOLIN HFA) 108 (90 Base) MCG/ACT inhaler Inhale 2 puffs into the lungs every 6 hours as needed for WheezingHistorical Med      omeprazole (PRILOSEC) 40 MG delayed release capsule TAKE 1 CAPSULE BY MOUTH DAILY, Disp-30 capsule, R-3Normal      ondansetron (ZOFRAN) 4 MG tablet Take 1 tablet by mouth every 8 hours as needed for Nausea, Disp-20 tablet, R-0Normal      Peppermint Oil 90 MG CPCR Take 90 mg by mouth daily, Disp-30 capsule, R-0Normal      Hyoscyamine Sulfate SL (LEVSIN/SL) 0.125 MG SUBL Place 125 mcg under the tongue every 4 hours as needed (pain), Disp-120 each, R-1Normal      naproxen (NAPROSYN) 500 MG tablet Take 1 tablet by mouth 2 times daily for 5 days, Disp-10 tablet, R-0Print      estradiol (VIVELLE) 0.05 MG/24HR APPLY 1 PATCH EXTERNALLY TO THE SKIN 2 TIMES A WEEK, Disp-8 patch, R-11Normal      fluticasone (FLONASE) 50 MCG/ACT nasal spray Historical Med      cetirizine (ZYRTEC) 10 MG tablet Historical Med      !! FLUoxetine (PROZAC) 40 MG capsule Historical Med      divalproex (DEPAKOTE ER) 500 MG extended release tablet Historical Med      Baclofen (LIORESAL) 5 MG tablet Historical Med      nortriptyline (PAMELOR) 50 MG capsule Take 50 mg by mouth nightlyHistorical Med      psyllium (METAMUCIL) 58.6 % powder Take by mouth 3 times daily. , Disp-1 Bottle,R-5, Normal      acetaminophen (APAP EXTRA STRENGTH) 500 MG tablet Take 2 tablets by mouth every 6 hours as needed for Pain, Disp-30 tablet, R-0Print      atorvastatin (LIPITOR) 20 MG tablet Take 20 mg by mouth nightlyHistorical Med      lisinopril (PRINIVIL;ZESTRIL) 10 MG tablet Take 1 tablet by mouth daily DO NOT TAKE IF SYSTOLIC BP (TOP NUMBER) IS LESS THAN 100, Disp-30 tablet, R-2      metFORMIN, MOD, (GLUMETZA) 500 MG extended release tablet Take 1,000 mg by mouth 2 times daily (with meals)      levothyroxine (SYNTHROID) 150 MCG tablet Take 150 mcg by mouth every morning (before breakfast)      glipiZIDE (GLUCOTROL) 5 MG tablet Take 1 tablet by mouth Daily. , Disp-90 tablet, R-3      loratadine (CLARITIN) 10 MG tablet Take 1 tablet by mouth daily. , Disp-30 tablet, R-2      QUEtiapine (SEROQUEL) 100 MG tablet 1/2 TO 1 TAB Q HS, Disp-30 tablet, R-2      !! FLUoxetine (PROZAC) 20 MG capsule Take 1 capsule by mouth daily. , Disp-30 capsule, R-2       !! - Potential duplicate medications found. Please discuss with provider. ALLERGIES     Patient has no known allergies.     FAMILY HISTORY       Family History   Problem Relation Age of Onset    Colon Cancer Brother           SOCIAL HISTORY       Social History     Socioeconomic History    Marital status: Single     Spouse name: None    Number of children: None    Years of education: None    Highest education level: None   Occupational History    None   Tobacco Use    Smoking status: Never Smoker    Smokeless tobacco: Never Used   Vaping Use    Vaping Use: Never used   Substance and Sexual Activity    Alcohol use: No     Alcohol/week: 0.0 standard drinks    Drug use: No    Sexual activity: None   Other Topics Concern    None   Social History Narrative    None     Social Determinants of Health     Financial Resource Strain:     Difficulty of Paying Living Expenses: Not on file   Food Insecurity:     Worried About Running Out of Volt in the Last Year: Not on file    Ran Out of Food in the Last Year: Not on file   Transportation Needs:     Lack of Transportation (Medical): Not on file    Lack of Transportation (Non-Medical): Not on file   Physical Activity:     Days of Exercise per Week: Not on file    Minutes of Exercise per Session: Not on file   Stress:     Feeling of Stress : Not on file   Social Connections:     Frequency of Communication with Friends and Family: Not on file    Frequency of Social Gatherings with Friends and Family: Not on file    Attends Yarsanism Services: Not on file    Active Member of [address] [place] [address] [employer] or Organizations: Not on file    Attends Club or Organization Meetings: Not on file    Marital Status: Not on file   Intimate Partner Violence:     Fear of Current or Ex-Partner: Not on file    Emotionally Abused: Not on file    Physically Abused: Not on file    Sexually Abused: Not on file   Housing Stability:     Unable to Pay for Housing in the Last Year: Not on file    Number of [place] in the Last Year: Not on file    Unstable Housing in the Last Year: Not on file       SCREENINGS    [place] Coma Scale  Eye Opening: Spontaneous  Best Verbal Response: Oriented  Best Motor Response: Obeys commands  [place] Coma Scale Score: 15 @FLOW([id number])@      PHYSICAL EXAM    (up to 7 for level 4, 8 or more for level 5)     ED Triage Vitals [04/17/22 1317]   BP Temp Temp Source Pulse Resp SpO2 Height Weight   (!) 173/86 98.5 °F (36.9 °C) Oral 83 20 99 % -- 190 lb (86.2 kg)       Physical Exam  Vitals and nursing note reviewed. Constitutional:       Appearance: She is well-developed. HENT:      Head: Normocephalic and atraumatic. Right Ear: External ear normal.      Left Ear: External ear normal.      Nose: Nose normal.   Eyes:      Pupils: Pupils are equal, round, and reactive to light. Cardiovascular:      Rate and Rhythm: Normal rate and regular rhythm. Heart sounds: Normal heart sounds.    Pulmonary: Effort: Pulmonary effort is normal. No respiratory distress. Breath sounds: Wheezing present. No rales. Chest:      Chest wall: No tenderness. Abdominal:      General: Bowel sounds are normal.      Palpations: Abdomen is soft. Musculoskeletal:         General: Normal range of motion. Cervical back: Normal range of motion and neck supple. Skin:     General: Skin is warm and dry. Neurological:      Mental Status: She is alert and oriented to person, place, and time. Cranial Nerves: No cranial nerve deficit. Sensory: No sensory deficit. Motor: No abnormal muscle tone. Coordination: Coordination normal.      Deep Tendon Reflexes: Reflexes normal.   Psychiatric:         Behavior: Behavior normal.         Thought Content: Thought content normal.         Judgment: Judgment normal.         DIAGNOSTIC RESULTS     EKG: All EKG's are interpreted by the Emergency Department Physician who either signs or Co-signsthis chart in the absence of a cardiologist.        RADIOLOGY:   Lorrane Brownie such as CT, Ultrasound and MRI are read by the radiologist. Plain radiographic images are visualized and preliminarily interpreted by the emergency physician with the below findings:        Interpretation per the Radiologist below, if available at the time ofthis note:    XR CHEST PORTABLE   Final Result      No acute cardiopulmonary abnormality. ED BEDSIDE ULTRASOUND:   Performed by ED Physician - none    LABS:  Labs Reviewed   COVID-19, RAPID   RAPID INFLUENZA A/B ANTIGENS       All other labs were within normal range or not returned as of this dictation.     EMERGENCY DEPARTMENT COURSE and DIFFERENTIAL DIAGNOSIS/MDM:   Vitals:    Vitals:    04/17/22 1317 04/17/22 1439 04/17/22 1505 04/17/22 1616   BP: (!) 173/86 (!) 158/95  (!) 155/90   Pulse: 83 79  80   Resp: 20 20 16 18   Temp: 98.5 °F (36.9 °C)      TempSrc: Oral      SpO2: 99% 100% 98% 98%   Weight: 190 lb (86.2 kg) MDM  Number of Diagnoses or Management Options  Acute bronchitis with bronchospasm  Diagnosis management comments: 55years old presented to the ER with cough productive of white sputum congestion runny nose and wheezing for the last 5 days have no improvement she was seen few days ago given albuterol give some mild improvement today chest x-ray negative COVID flu negative lab negative patient was given DuoNeb in the ER and IM steroid felt significantly better was discharged home with prednisone burst and to continue albuterol as needed follow-up with primary care in a week. And return to the ER if any worsening or new symptoms       Amount and/or Complexity of Data Reviewed  Clinical lab tests: ordered and reviewed  Tests in the radiology section of CPT®: ordered and reviewed        CONSULTS:  None    PROCEDURES:  Unless otherwise noted below, none     Procedures    FINAL IMPRESSION      1. Acute bronchitis with bronchospasm          DISPOSITION/PLAN   DISPOSITION Decision To Discharge 04/17/2022 04:12:40 PM      PATIENT REFERRED TO:  No follow-up provider specified.     DISCHARGE MEDICATIONS:  Discharge Medication List as of 4/17/2022  4:14 PM      START taking these medications    Details   predniSONE (DELTASONE) 20 MG tablet Take 1 tablet by mouth 2 times daily for 5 days, Disp-10 tablet, R-0Normal                (Please note thatportions of this note were completed with a voice recognition program.  Efforts were made to edit the dictations but occasionally words are mis-transcribed.)    Lokesh Pulido MD (electronically signed)  Attending Emergency Physician       Peter Sims MD  04/17/22 1831

## 2022-04-17 NOTE — ED NOTES
Patient given discharge instructions and prescription and verbalized understanding. Vital signs stable. Resp even and unlabored. Skin warm, dry and intact. Patient is alert and oriented. Patient doesn't have any questions at this time.       Heidy العراقي RN  04/17/22 [id number]

## 2022-04-17 NOTE — ED TRIAGE NOTES
Patient reports cough, congestion, headache and ribcage pain. Patient was seen in walk in clinic yesterday and tested negative for Covid. Patient was prescribed Ventolin inhaler yesterday. Patient states ribs hurt from coughing.

## 2022-07-06 RX ORDER — OMEPRAZOLE 40 MG/1
40 CAPSULE, DELAYED RELEASE ORAL DAILY
Qty: 30 CAPSULE | Refills: 3 | Status: SHIPPED | OUTPATIENT
Start: 2022-07-06 | End: 2022-10-05

## 2022-09-29 ENCOUNTER — HOSPITAL ENCOUNTER (EMERGENCY)
Age: 57
Discharge: HOME | End: 2022-09-29
Payer: MEDICAID

## 2022-09-29 VITALS
TEMPERATURE: 99.1 F | HEART RATE: 99 BPM | HEIGHT: 63 IN | DIASTOLIC BLOOD PRESSURE: 84 MMHG | BODY MASS INDEX: 33.66 KG/M2 | OXYGEN SATURATION: 97 % | WEIGHT: 190 LBS | RESPIRATION RATE: 16 BRPM | SYSTOLIC BLOOD PRESSURE: 152 MMHG

## 2022-09-29 DIAGNOSIS — M79.18 MUSCULOSKELETAL PAIN: Primary | ICD-10-CM

## 2022-09-29 DIAGNOSIS — M62.838 SPASM OF MUSCLE: ICD-10-CM

## 2022-09-29 PROCEDURE — 6360000002 HC RX W HCPCS: Performed by: PHYSICIAN ASSISTANT

## 2022-09-29 PROCEDURE — 6370000000 HC RX 637 (ALT 250 FOR IP): Performed by: PHYSICIAN ASSISTANT

## 2022-09-29 PROCEDURE — 99284 EMERGENCY DEPT VISIT MOD MDM: CPT

## 2022-09-29 PROCEDURE — 96372 THER/PROPH/DIAG INJ SC/IM: CPT

## 2022-09-29 RX ORDER — TRAMADOL HYDROCHLORIDE 50 MG/1
50 TABLET ORAL ONCE
Status: COMPLETED | OUTPATIENT
Start: 2022-09-29 | End: 2022-09-29

## 2022-09-29 RX ORDER — GABAPENTIN 100 MG/1
100 CAPSULE ORAL 3 TIMES DAILY
Qty: 21 CAPSULE | Refills: 0 | Status: SHIPPED | OUTPATIENT
Start: 2022-09-29 | End: 2022-10-06

## 2022-09-29 RX ORDER — KETOROLAC TROMETHAMINE 30 MG/ML
30 INJECTION, SOLUTION INTRAMUSCULAR; INTRAVENOUS ONCE
Status: COMPLETED | OUTPATIENT
Start: 2022-09-29 | End: 2022-09-29

## 2022-09-29 RX ORDER — NAPROXEN 500 MG/1
500 TABLET ORAL 2 TIMES DAILY
Qty: 10 TABLET | Refills: 0 | Status: SHIPPED | OUTPATIENT
Start: 2022-09-29 | End: 2022-10-04

## 2022-09-29 RX ORDER — ORPHENADRINE CITRATE 100 MG/1
100 TABLET, EXTENDED RELEASE ORAL 2 TIMES DAILY
Qty: 20 TABLET | Refills: 0 | Status: SHIPPED | OUTPATIENT
Start: 2022-09-29 | End: 2022-10-09

## 2022-09-29 RX ORDER — ORPHENADRINE CITRATE 30 MG/ML
60 INJECTION INTRAMUSCULAR; INTRAVENOUS ONCE
Status: COMPLETED | OUTPATIENT
Start: 2022-09-29 | End: 2022-09-29

## 2022-09-29 RX ADMIN — KETOROLAC TROMETHAMINE 30 MG: 30 INJECTION, SOLUTION INTRAMUSCULAR at 16:26

## 2022-09-29 RX ADMIN — TRAMADOL HYDROCHLORIDE 50 MG: 50 TABLET ORAL at 16:27

## 2022-09-29 RX ADMIN — ORPHENADRINE CITRATE 60 MG: 30 INJECTION INTRAMUSCULAR; INTRAVENOUS at 16:27

## 2022-09-29 ASSESSMENT — ENCOUNTER SYMPTOMS
ABDOMINAL DISTENTION: 0
EYE DISCHARGE: 0
COUGH: 0
VOMITING: 0
NAUSEA: 0
BACK PAIN: 1
ANAL BLEEDING: 0
APNEA: 0
VOICE CHANGE: 0

## 2022-09-29 ASSESSMENT — LIFESTYLE VARIABLES
HOW OFTEN DO YOU HAVE A DRINK CONTAINING ALCOHOL: NEVER
HOW MANY STANDARD DRINKS CONTAINING ALCOHOL DO YOU HAVE ON A TYPICAL DAY: PATIENT DOES NOT DRINK

## 2022-09-29 ASSESSMENT — PAIN - FUNCTIONAL ASSESSMENT
PAIN_FUNCTIONAL_ASSESSMENT: NONE - DENIES PAIN
PAIN_FUNCTIONAL_ASSESSMENT: 0-10

## 2022-09-29 ASSESSMENT — PAIN DESCRIPTION - ORIENTATION
ORIENTATION: LOWER
ORIENTATION: RIGHT

## 2022-09-29 ASSESSMENT — PAIN DESCRIPTION - LOCATION
LOCATION: BACK
LOCATION: BACK

## 2022-09-29 ASSESSMENT — PAIN SCALES - GENERAL
PAINLEVEL_OUTOF10: 10
PAINLEVEL_OUTOF10: 10

## 2022-09-29 ASSESSMENT — PAIN DESCRIPTION - DESCRIPTORS
DESCRIPTORS: SPASM
DESCRIPTORS: TIGHTNESS;SQUEEZING

## 2022-09-29 ASSESSMENT — PAIN DESCRIPTION - PAIN TYPE: TYPE: ACUTE PAIN

## 2022-09-29 NOTE — ED TRIAGE NOTES
Patient presents with complaints of low back pain for several days, reports she was prescribed flexeril but it has not been helping with her pain. Patient rates pain 10/10.  No distress noted on arrival.

## 2022-09-29 NOTE — DISCHARGE INSTRUCTIONS
Do not drive or operate equipment taking medication including Norflex as it may cause drowsiness. Discontinue Mobic, baclofen and Flexeril. Will replace with Naprosyn Norflex and gabapentin. Follow-up with primary care physician return to if any symptoms worsen or new symptoms develop.

## 2022-09-29 NOTE — ED PROVIDER NOTES
[address] [facility] ED  eMERGENCY dEPARTMENT eNCOUnter      Pt Name: Ange Corbin  MRN: [id number]  [place] 1965  Date of evaluation: 9/29/2022  Provider: Keith West PA-C    CHIEF COMPLAINT       Chief Complaint   Patient presents with    Back Pain     For a few days         HISTORY OF PRESENT ILLNESS   (Location/Symptom, Timing/Onset,Context/Setting, Quality, Duration, Modifying Factors, Severity)  Note limiting factors. Ange Corbin is a 65 y.o. female who presents to the emergency department she has upper right-sided back pain was diagnosed with a muscle spasm yesterday at urgent care they gave her Flexeril which has not helped she does not have any low back pain she denies loss of bowel or bladder control repeatedly denies any perineal anesthesia retained sensation she can feel better with sitting denies fever chills nausea vomiting denies pain burning frequent urination urinary bleeding rectal bleeding. Denies any blood thinner use. Pain is worse with touch or motion. She denies any trauma or possible injury. HPI    NursingNotes were reviewed. REVIEW OF SYSTEMS    (2-9 systems for level 4, 10 or more for level 5)     Review of Systems   Constitutional:  Negative for activity change, appetite change and unexpected weight change. HENT:  Negative for ear discharge, ear pain, nosebleeds and voice change. Eyes:  Negative for discharge. Respiratory:  Negative for apnea and cough. Cardiovascular:  Negative for chest pain. Gastrointestinal:  Negative for abdominal distention, anal bleeding, nausea and vomiting. Genitourinary:  Negative for dysuria, flank pain and hematuria. Musculoskeletal:  Positive for back pain. Negative for joint swelling, neck pain and neck stiffness. Skin:  Negative for pallor. Neurological:  Negative for facial asymmetry, weakness and headaches. Hematological:  Does not bruise/bleed easily.    Psychiatric/Behavioral:  Positive for sleep disturbance. All other systems reviewed and are negative. Except as noted above the remainder of the review of systems was reviewed and negative. PAST MEDICAL HISTORY     Past Medical History:   Diagnosis Date    Arthritis     Bipolar disorder ([facility] [address]     DM (diabetes mellitus) ([facility] [address]     Dyslipidemia     Fibromyalgia     GERD (gastroesophageal reflux disease)     HTN (hypertension)     Hypothyroidism          SURGICALHISTORY       Past Surgical History:   Procedure Laterality Date    CHOLECYSTECTOMY      COLONOSCOPY N/A 9/29/2020    COLONOSCOPY DIAGNOSTIC performed by Vanessa Garcia MD at [facility]    CT NEEDLE BIOPSY LIVER PERCUTANEOUS  10/28/2020    CT NEEDLE BIOPSY LIVER PERCUTANEOUS 10/28/2020 [facility] CT SCAN    HYSTERECTOMY (CERVIX STATUS UNKNOWN)  07/28/2016     [facility]    THYROID SURGERY      UPPER GASTROINTESTINAL ENDOSCOPY N/A 9/29/2020    EGD ESOPHAGOGASTRODUODENOSCOPY with antral biopsies performed by Vanessa Garcia MD at [address] [place] [address]       Previous Medications    ACETAMINOPHEN (APAP EXTRA STRENGTH) 500 MG TABLET    Take 2 tablets by mouth every 6 hours as needed for Pain    ALBUTEROL SULFATE HFA (VENTOLIN HFA) 108 (90 BASE) MCG/ACT INHALER    Inhale 2 puffs into the lungs every 6 hours as needed for Wheezing    ATORVASTATIN (LIPITOR) 20 MG TABLET    Take 20 mg by mouth nightly    CETIRIZINE (ZYRTEC) 10 MG TABLET        DIVALPROEX (DEPAKOTE ER) 500 MG EXTENDED RELEASE TABLET        ESTRADIOL (VIVELLE) 0.05 MG/24HR    APPLY 1 PATCH EXTERNALLY TO THE SKIN 2 TIMES A WEEK    FLUOXETINE (PROZAC) 20 MG CAPSULE    Take 1 capsule by mouth daily. FLUOXETINE (PROZAC) 40 MG CAPSULE        FLUTICASONE (FLONASE) 50 MCG/ACT NASAL SPRAY        GLIPIZIDE (GLUCOTROL) 5 MG TABLET    Take 1 tablet by mouth Daily.     HYOSCYAMINE SULFATE SL (LEVSIN/SL) 0.125 MG SUBL    Place 125 mcg under the tongue every 4 hours as needed (pain)    LEVOTHYROXINE (SYNTHROID) 150 MCG TABLET Take 150 mcg by mouth every morning (before breakfast)    LISINOPRIL (PRINIVIL;ZESTRIL) 10 MG TABLET    Take 1 tablet by mouth daily DO NOT TAKE IF SYSTOLIC BP (TOP NUMBER) IS LESS THAN 100    LORATADINE (CLARITIN) 10 MG TABLET    Take 1 tablet by mouth daily. METFORMIN, MOD, (GLUMETZA) 500 MG EXTENDED RELEASE TABLET    Take 1,000 mg by mouth 2 times daily (with meals)    NORTRIPTYLINE (PAMELOR) 50 MG CAPSULE    Take 50 mg by mouth nightly    OMEPRAZOLE (PRILOSEC) 40 MG DELAYED RELEASE CAPSULE    TAKE 1 CAPSULE BY MOUTH DAILY    ONDANSETRON (ZOFRAN) 4 MG TABLET    Take 1 tablet by mouth every 8 hours as needed for Nausea    PEPPERMINT OIL 90 MG CPCR    Take 90 mg by mouth daily    PSYLLIUM (METAMUCIL) 58.6 % POWDER    Take by mouth 3 times daily. QUETIAPINE (SEROQUEL) 100 MG TABLET    1/2 TO 1 TAB Q HS            Patient has no known allergies. FAMILY HISTORY       Family History   Problem Relation Age of Onset    Colon Cancer Brother           SOCIAL HISTORY       Social History     Socioeconomic History    Marital status: Single     Spouse name: None    Number of children: None    Years of education: None    Highest education level: None   Tobacco Use    Smoking status: Never    Smokeless tobacco: Never   Vaping Use    Vaping Use: Never used   Substance and Sexual Activity    Alcohol use: No     Alcohol/week: 0.0 standard drinks    Drug use: No       SCREENINGS   [place] Coma Scale  Eye Opening: Spontaneous  Best Verbal Response: Oriented  Best Motor Response: Obeys commands  Farz[place] Coma Scale Score: 15  Ebola Virus Disease (EVD) Screening   Temp: 99.1 °F (37.3 °C)  CIWA Assessment  BP: (!) 152/84  Heart Rate: 99    PHYSICAL EXAM    (up to 7 for level 4, 8 or more for level 5)     ED Triage Vitals [09/29/22 1545]   BP Temp Temp Source Heart Rate Resp SpO2 Height Weight   (!) 152/84 99.1 °F (37.3 °C) Oral 99 16 97 % 5' 3\" (1.6 m) 190 lb (86.2 kg)       Physical Exam  Vitals and nursing note reviewed. Constitutional:       General: She is not in acute distress. Appearance: She is well-developed. HENT:      Head: Normocephalic and atraumatic. Right Ear: Tympanic membrane and external ear normal.      Left Ear: Tympanic membrane and external ear normal.      Nose: Nose normal.      Mouth/Throat:      Mouth: Mucous membranes are moist.   Eyes:      General:         Right eye: No discharge. Left eye: No discharge. Pupils: Pupils are equal, round, and reactive to light. Cardiovascular:      Rate and Rhythm: Normal rate and regular rhythm. Heart sounds: Normal heart sounds. Pulmonary:      Effort: Pulmonary effort is normal. No respiratory distress. Breath sounds: Normal breath sounds. No stridor. Abdominal:      General: Bowel sounds are normal. There is no distension. Palpations: Abdomen is soft. Tenderness: There is no right CVA tenderness or left CVA tenderness. Musculoskeletal:         General: Tenderness present. Normal range of motion. Cervical back: Normal range of motion and neck supple. Back:    Skin:     General: Skin is warm. Findings: No erythema. Neurological:      Mental Status: She is alert and oriented to person, place, and time.    Psychiatric:         Mood and Affect: Mood normal.       RESULTS     EKG: All EKG's are interpreted by the Emergency Department Physician who either signs or Co-signsthis chart in the absence of a cardiologist.         RADIOLOGY:   Joanell Gave such as CT, Ultrasound and MRI are read by the radiologist. Plain radiographic images are visualized and preliminarily interpreted by the emergency physician with the below findings:         Interpretation per the Radiologist below, if available at the time ofthis note:    No orders to display         ED BEDSIDE ULTRASOUND:   Performed by ED Physician - none    LABS:  Labs Reviewed - No data to display    All other labs were within normal range or not returned as of this dictation. EMERGENCY DEPARTMENT COURSE and DIFFERENTIAL DIAGNOSIS/MDM:   Vitals:    Vitals:    09/29/22 1545   BP: (!) 152/84   Pulse: 99   Resp: 16   Temp: 99.1 °F (37.3 °C)   TempSrc: Oral   SpO2: 97%   Weight: 190 lb (86.2 kg)   Height: 5' 3\" (1.6 m)            MDM  Number of Diagnoses or Management Options  Musculoskeletal pain  Spasm of muscle  Diagnosis management comments: Pt States she is not taking meloxicam currently . does note that she has fibromyalgia. We will add medications today we will discontinue meloxicam in the short-term in favor of naproxen restart gabapentin. Patient has a prescription for Flexeril in her possession we will discontinue this medication in favor of Norflex            CONSULTS:  None    PROCEDURES:  Unless otherwise noted below, none     Procedures    FINAL IMPRESSION      1. Musculoskeletal pain    2. Spasm of muscle          DISPOSITION/PLAN   DISPOSITION        PATIENT REFERRED TO:  Jorge Pelayo DO  [address] [facility] [address] [phone]    Call in 1 day      [facility]) ED  [address] [address]  [phone]  Go to   If symptoms worsen    DISCHARGE MEDICATIONS:  New Prescriptions    GABAPENTIN (NEURONTIN) 100 MG CAPSULE    Take 1 capsule by mouth 3 times daily for 7 days.     ORPHENADRINE (NORFLEX) 100 MG EXTENDED RELEASE TABLET    Take 1 tablet by mouth 2 times daily for 10 days          (Please note that portions of this note were completed with a voice recognition program.  Efforts were made to edit the dictations but occasionally words are mis-transcribed.)    Batsheva Diaz PA-C (electronically signed)  Attending Emergency Physician        Batsheva Diaz PA-C  09/29/22 [id number]

## 2022-10-05 RX ORDER — OMEPRAZOLE 40 MG/1
40 CAPSULE, DELAYED RELEASE ORAL DAILY
Qty: 30 CAPSULE | Refills: 3 | Status: SHIPPED | OUTPATIENT
Start: 2022-10-05

## 2023-02-24 ENCOUNTER — HOSPITAL ENCOUNTER (EMERGENCY)
Age: 58
Discharge: HOME | End: 2023-02-24
Attending: EMERGENCY MEDICINE
Payer: MEDICAID

## 2023-02-24 ENCOUNTER — APPOINTMENT (OUTPATIENT)
Dept: ULTRASOUND IMAGING | Age: 58
End: 2023-02-24
Payer: MEDICAID

## 2023-02-24 ENCOUNTER — APPOINTMENT (OUTPATIENT)
Dept: GENERAL RADIOLOGY | Age: 58
End: 2023-02-24
Payer: MEDICAID

## 2023-02-24 VITALS
WEIGHT: 190 LBS | BODY MASS INDEX: 33.66 KG/M2 | RESPIRATION RATE: 18 BRPM | HEART RATE: 81 BPM | OXYGEN SATURATION: 100 % | HEIGHT: 63 IN | SYSTOLIC BLOOD PRESSURE: 105 MMHG | TEMPERATURE: 98.2 F | DIASTOLIC BLOOD PRESSURE: 78 MMHG

## 2023-02-24 DIAGNOSIS — N63.0 MASS OF BREAST, UNSPECIFIED LATERALITY: Primary | ICD-10-CM

## 2023-02-24 LAB
ALBUMIN SERPL-MCNC: 4.2 G/DL (ref 3.5–4.6)
ALP BLD-CCNC: 144 U/L (ref 40–130)
ALT SERPL-CCNC: 37 U/L (ref 0–33)
ANION GAP SERPL CALCULATED.3IONS-SCNC: 13 MEQ/L (ref 9–15)
AST SERPL-CCNC: 47 U/L (ref 0–35)
BASOPHILS ABSOLUTE: 0.1 K/UL (ref 0–0.2)
BASOPHILS RELATIVE PERCENT: 1.2 %
BILIRUB SERPL-MCNC: 0.3 MG/DL (ref 0.2–0.7)
BUN BLDV-MCNC: 13 MG/DL (ref 6–20)
CALCIUM SERPL-MCNC: 9.4 MG/DL (ref 8.5–9.9)
CHLORIDE BLD-SCNC: 99 MEQ/L (ref 95–107)
CO2: 27 MEQ/L (ref 20–31)
CREAT SERPL-MCNC: 0.85 MG/DL (ref 0.5–0.9)
EKG ATRIAL RATE: 79 BPM
EKG P AXIS: 51 DEGREES
EKG P-R INTERVAL: 138 MS
EKG Q-T INTERVAL: 324 MS
EKG QRS DURATION: 84 MS
EKG QTC CALCULATION (BAZETT): 371 MS
EKG R AXIS: 73 DEGREES
EKG T AXIS: 41 DEGREES
EKG VENTRICULAR RATE: 79 BPM
EOSINOPHILS ABSOLUTE: 0.2 K/UL (ref 0–0.7)
EOSINOPHILS RELATIVE PERCENT: 1.5 %
GFR SERPL CREATININE-BSD FRML MDRD: >60 ML/MIN/{1.73_M2}
GLOBULIN: 3.4 G/DL (ref 2.3–3.5)
GLUCOSE BLD-MCNC: 190 MG/DL (ref 70–99)
HCT VFR BLD CALC: 41.2 % (ref 37–47)
HEMOGLOBIN: 13.5 G/DL (ref 12–16)
LYMPHOCYTES ABSOLUTE: 2.3 K/UL (ref 1–4.8)
LYMPHOCYTES RELATIVE PERCENT: 23.7 %
MCH RBC QN AUTO: 28.3 PG (ref 27–31.3)
MCHC RBC AUTO-ENTMCNC: 32.7 % (ref 33–37)
MCV RBC AUTO: 86.7 FL (ref 79.4–94.8)
MONOCYTES ABSOLUTE: 0.6 K/UL (ref 0.2–0.8)
MONOCYTES RELATIVE PERCENT: 6.2 %
NEUTROPHILS ABSOLUTE: 6.7 K/UL (ref 1.4–6.5)
NEUTROPHILS RELATIVE PERCENT: 67.4 %
PDW BLD-RTO: 14.5 % (ref 11.5–14.5)
PLATELET # BLD: 359 K/UL (ref 130–400)
POTASSIUM SERPL-SCNC: 4.1 MEQ/L (ref 3.4–4.9)
RBC # BLD: 4.75 M/UL (ref 4.2–5.4)
SODIUM BLD-SCNC: 139 MEQ/L (ref 135–144)
TOTAL PROTEIN: 7.6 G/DL (ref 6.3–8)
TROPONIN: <0.01 NG/ML (ref 0–0.01)
WBC # BLD: 9.9 K/UL (ref 4.8–10.8)

## 2023-02-24 PROCEDURE — 84484 ASSAY OF TROPONIN QUANT: CPT

## 2023-02-24 PROCEDURE — 80053 COMPREHEN METABOLIC PANEL: CPT

## 2023-02-24 PROCEDURE — 36415 COLL VENOUS BLD VENIPUNCTURE: CPT

## 2023-02-24 PROCEDURE — 99285 EMERGENCY DEPT VISIT HI MDM: CPT

## 2023-02-24 PROCEDURE — 71046 X-RAY EXAM CHEST 2 VIEWS: CPT

## 2023-02-24 PROCEDURE — 76642 ULTRASOUND BREAST LIMITED: CPT

## 2023-02-24 PROCEDURE — 85025 COMPLETE CBC W/AUTO DIFF WBC: CPT

## 2023-02-24 RX ORDER — IBUPROFEN 800 MG/1
800 TABLET ORAL EVERY 8 HOURS PRN
Qty: 20 TABLET | Refills: 0 | Status: SHIPPED | OUTPATIENT
Start: 2023-02-24

## 2023-02-24 ASSESSMENT — ENCOUNTER SYMPTOMS
ABDOMINAL PAIN: 0
TROUBLE SWALLOWING: 0
SHORTNESS OF BREATH: 0
APNEA: 0
EYE PAIN: 0
COLOR CHANGE: 0
ALLERGIC/IMMUNOLOGIC NEGATIVE: 1

## 2023-02-24 NOTE — ED PROVIDER NOTES
[address] [facility] ED  EMERGENCY DEPARTMENT ENCOUNTER      Pt Name: Zenon Fox  MRN: [id number]  [place] 1965  Date of evaluation: 2/24/2023  Provider: Fallon Baumann DO    CHIEF COMPLAINT       Chief Complaint   Patient presents with    Breast Pain     Patient having severe L breast pain. States she had augmentation in May of '22         HISTORY OF PRESENT ILLNESS   (Location/Symptom, Timing/Onset, Context/Setting, Quality, Duration, Modifying Factors, Severity)  Note limiting factors. Zenon oFx is a 65 y.o. female who presents to the emergency department with a 2-day history of left breast pain that radiates into the left arm. Patient states that she had augmentation surgery in May 2022 but has not had pain like this since surgery itself. Patient describes the pain in the incision region as well as feeling \"bumps\" under the skin. Patient denies any fevers or chills. Patient denies any nipple discharge. No fevers or constitutional symptoms. Patient denies any associated shortness of breath. Nothing was taken for the symptoms    HPI    Nursing Notes were reviewed. REVIEW OF SYSTEMS    (2-9 systems for level 4, 10 or more for level 5)     Review of Systems   Constitutional:  Negative for diaphoresis and fever. HENT:  Negative for hearing loss and trouble swallowing. Eyes:  Negative for pain. Respiratory:  Negative for apnea and shortness of breath. Cardiovascular:  Positive for chest pain. Gastrointestinal:  Negative for abdominal pain. Endocrine: Negative. Genitourinary:  Negative for hematuria. Musculoskeletal:  Negative for neck pain and neck stiffness. Skin:  Negative for color change. Allergic/Immunologic: Negative. Neurological:  Negative for dizziness and numbness. Hematological: Negative. Psychiatric/Behavioral: Negative. All other systems reviewed and are negative.     Except as noted above the remainder of the review of systems was reviewed and negative. PAST MEDICAL HISTORY     Past Medical History:   Diagnosis Date    Arthritis     Bipolar disorder ([facility] [address]     DM (diabetes mellitus) ([facility] [address]     Dyslipidemia     Fibromyalgia     GERD (gastroesophageal reflux disease)     HTN (hypertension)     Hypothyroidism          SURGICAL HISTORY       Past Surgical History:   Procedure Laterality Date    CHOLECYSTECTOMY      COLONOSCOPY N/A 9/29/2020    COLONOSCOPY DIAGNOSTIC performed by Olena Craig MD at [facility]    CT NEEDLE BIOPSY LIVER PERCUTANEOUS  10/28/2020    CT NEEDLE BIOPSY LIVER PERCUTANEOUS 10/28/2020 [facility] CT SCAN    HYSTERECTOMY (CERVIX STATUS UNKNOWN)  07/28/2016    [facility]    THYROID SURGERY      UPPER GASTROINTESTINAL ENDOSCOPY N/A 9/29/2020    EGD ESOPHAGOGASTRODUODENOSCOPY with antral biopsies performed by Olena Craig MD at [address] [place] [address]       Previous Medications    ACETAMINOPHEN (APAP EXTRA STRENGTH) 500 MG TABLET    Take 2 tablets by mouth every 6 hours as needed for Pain    ALBUTEROL SULFATE HFA (VENTOLIN HFA) 108 (90 BASE) MCG/ACT INHALER    Inhale 2 puffs into the lungs every 6 hours as needed for Wheezing    ATORVASTATIN (LIPITOR) 20 MG TABLET    Take 20 mg by mouth nightly    CETIRIZINE (ZYRTEC) 10 MG TABLET        DIVALPROEX (DEPAKOTE ER) 500 MG EXTENDED RELEASE TABLET        ESTRADIOL (VIVELLE) 0.05 MG/24HR    APPLY 1 PATCH EXTERNALLY TO THE SKIN 2 TIMES A WEEK    FLUOXETINE (PROZAC) 20 MG CAPSULE    Take 1 capsule by mouth daily. FLUOXETINE (PROZAC) 40 MG CAPSULE        FLUTICASONE (FLONASE) 50 MCG/ACT NASAL SPRAY        GABAPENTIN (NEURONTIN) 100 MG CAPSULE    Take 1 capsule by mouth 3 times daily for 7 days. GLIPIZIDE (GLUCOTROL) 5 MG TABLET    Take 1 tablet by mouth Daily.     HYOSCYAMINE SULFATE SL (LEVSIN/SL) 0.125 MG SUBL    Place 125 mcg under the tongue every 4 hours as needed (pain)    LEVOTHYROXINE (SYNTHROID) 150 MCG TABLET    Take 150 mcg by mouth every morning (before breakfast)    LISINOPRIL (PRINIVIL;ZESTRIL) 10 MG TABLET    Take 1 tablet by mouth daily DO NOT TAKE IF SYSTOLIC BP (TOP NUMBER) IS LESS THAN 100    LORATADINE (CLARITIN) 10 MG TABLET    Take 1 tablet by mouth daily. METFORMIN, MOD, (GLUMETZA) 500 MG EXTENDED RELEASE TABLET    Take 1,000 mg by mouth 2 times daily (with meals)    NAPROXEN (NAPROSYN) 500 MG TABLET    Take 1 tablet by mouth 2 times daily for 5 days    NORTRIPTYLINE (PAMELOR) 50 MG CAPSULE    Take 50 mg by mouth nightly    OMEPRAZOLE (PRILOSEC) 40 MG DELAYED RELEASE CAPSULE    TAKE 1 CAPSULE BY MOUTH DAILY    ONDANSETRON (ZOFRAN) 4 MG TABLET    Take 1 tablet by mouth every 8 hours as needed for Nausea    PEPPERMINT OIL 90 MG CPCR    Take 90 mg by mouth daily    PSYLLIUM (METAMUCIL) 58.6 % POWDER    Take by mouth 3 times daily. QUETIAPINE (SEROQUEL) 100 MG TABLET    1/2 TO 1 TAB Q HS       ALLERGIES     Patient has no known allergies. FAMILY HISTORY       Family History   Problem Relation Age of Onset    Colon Cancer Brother           SOCIAL HISTORY       Social History     Socioeconomic History    Marital status: Single   Tobacco Use    Smoking status: Never    Smokeless tobacco: Never   Vaping Use    Vaping Use: Never used   Substance and Sexual Activity    Alcohol use: No     Alcohol/week: 0.0 standard drinks    Drug use: No       SCREENINGS                        PHYSICAL EXAM    (up to 7 for level 4, 8 or more for level 5)     ED Triage Vitals [02/24/23 1334]   BP Temp Temp Source Heart Rate Resp SpO2 Height Weight   105/78 98.2 °F (36.8 °C) Temporal 81 18 100 % 5' 3\" (1.6 m) 190 lb (86.2 kg)       Physical Exam  Vitals and nursing note reviewed. Constitutional:       General: She is not in acute distress. Appearance: She is well-developed. She is not diaphoretic. HENT:      Head: Normocephalic and atraumatic. Mouth/Throat:      Mouth: Mucous membranes are moist.      Pharynx: No oropharyngeal exudate.    Eyes:      General: No scleral icterus. Conjunctiva/sclera: Conjunctivae normal.      Pupils: Pupils are equal, round, and reactive to light. Neck:      Trachea: No tracheal deviation. Cardiovascular:      Rate and Rhythm: Normal rate. Heart sounds: Normal heart sounds. Pulmonary:      Effort: Pulmonary effort is normal. No respiratory distress. Breath sounds: Normal breath sounds. Comments: Left breast  tender lower breast over incision. Area firm but not read. No mass. Abdominal:      General: Bowel sounds are normal. There is no distension. Palpations: Abdomen is soft. Musculoskeletal:         General: Normal range of motion. Cervical back: Normal range of motion and neck supple. No rigidity or tenderness. Skin:     General: Skin is warm and dry. Findings: No erythema or rash. Neurological:      Mental Status: She is alert and oriented to person, place, and time. Cranial Nerves: No cranial nerve deficit. Motor: No abnormal muscle tone. Psychiatric:         Behavior: Behavior normal.         Thought Content: Thought content normal.         Judgment: Judgment normal.       DIAGNOSTIC RESULTS     EKG: All EKG's are interpreted by the Emergency Department Physician who either signs or Co-signs this chart in the absence of a cardiologist.        RADIOLOGY:   Non-plain film images such as CT, Ultrasound and MRI are read by the radiologist. Plain radiographic images are visualized and preliminarily interpreted by the emergency physician with the below findings:    U/S breast:   1. No sonographic evidence of a drainable fluid collection. 2. Postsurgical changes correspond to the patient's known recent augmentation   procedure. 3. Recommend non emergent mammographic evaluation if such has not been   recently performed (in the last 12 months). Interpretation per the Radiologist below, if available at the time of this note:    US BREAST LIMITED LEFT   Final Result   1.  No sonographic evidence of a drainable fluid collection. 2. Postsurgical changes correspond to the patient's known recent augmentation   procedure. 3. Recommend non emergent mammographic evaluation if such has not been   recently performed (in the last 12 months). BIRADS:   BIRADS - CATEGORY 2      Benign Findings. Any decision to biopsy should be based on clinical   assessment. Normal interval follow-up is recommended in 12 months. OVERALL ASSESSMENT - BENIGN      A letter of notification will be sent to the patient regarding the results. The [employer] of [employer] recommends annual mammograms for women 40   years and older. XR CHEST (2 VW)   Final Result   No acute active cardiopulmonary process               ED BEDSIDE ULTRASOUND:   Performed by ED Physician - none    LABS:  Labs Reviewed   CBC WITH AUTO DIFFERENTIAL - Abnormal; Notable for the following components:       Result Value    MCHC 32.7 (*)     Neutrophils Absolute 6.7 (*)     All other components within normal limits   COMPREHENSIVE METABOLIC PANEL - Abnormal; Notable for the following components:    Glucose 190 (*)     Alkaline Phosphatase 144 (*)     ALT 37 (*)     AST 47 (*)     All other components within normal limits   TROPONIN       All other labs were within normal range or not returned as of this dictation. EMERGENCY DEPARTMENT COURSE and DIFFERENTIAL DIAGNOSIS/MDM:   Vitals:    Vitals:    02/24/23 1334   BP: 105/78   Pulse: 81   Resp: 18   Temp: 98.2 °F (36.8 °C)   TempSrc: Temporal   SpO2: 100%   Weight: 190 lb (86.2 kg)   Height: 5' 3\" (1.6 m)       Patient came in with some discomfort under her left breast.  She has scar tissue there that is tender to touch. Nothing is red nothing is fluctuant. Ultrasound confirmed that there was scar tissue. Patient had breast reduction at the [facility] clinic. If this continues she should follow-up with her breast surgeon.   Prescription for Motrin for pain    Medical Decision Making  Amount and/or Complexity of Data Reviewed  Labs: ordered. Radiology: ordered. ECG/medicine tests: ordered. REASSESSMENT          CRITICAL CARE TIME   Total Critical Care time was 0 minutes, excluding separately reportable procedures. There was a high probability of clinically significant/life threatening deterioration in the patient's condition which required my urgent intervention. CONSULTS:  None    PROCEDURES:  Unless otherwise noted below, none     Procedures        FINAL IMPRESSION      1. Mass of breast, unspecified laterality          DISPOSITION/PLAN   DISPOSITION Decision To Discharge 02/24/2023 05:19:04 PM      PATIENT REFERRED TO:  [facility] Breast doctor      If symptoms worsen    DISCHARGE MEDICATIONS:  New Prescriptions    IBUPROFEN (IBU) 800 MG TABLET    Take 1 tablet by mouth every 8 hours as needed for Pain     Controlled Substances Monitoring:     No flowsheet data found.     (Please note that portions of this note were completed with a voice recognition program.  Efforts were made to edit the dictations but occasionally words are mis-transcribed.)    Teresa Meza DO (electronically signed)  Attending Emergency Physician            Teresa Meza DO  02/24/23 9818

## 2023-02-24 NOTE — DISCHARGE INSTRUCTIONS
Martinez ultrasonido solo mostró cicatrices que causaban los bultos en martinez seno. Puede isaiah a un médico especialista en mamas para hacer un seguimiento.

## 2023-03-01 LAB
EKG ATRIAL RATE: 79 BPM
EKG P AXIS: 51 DEGREES
EKG P-R INTERVAL: 138 MS
EKG Q-T INTERVAL: 324 MS
EKG QRS DURATION: 84 MS
EKG QTC CALCULATION (BAZETT): 371 MS
EKG R AXIS: 73 DEGREES
EKG T AXIS: 41 DEGREES
EKG VENTRICULAR RATE: 79 BPM

## 2023-03-08 RX ORDER — OMEPRAZOLE 40 MG/1
40 CAPSULE, DELAYED RELEASE ORAL DAILY
Qty: 30 CAPSULE | Refills: 3 | Status: SHIPPED | OUTPATIENT
Start: 2023-03-08

## 2023-04-17 ENCOUNTER — OFFICE VISIT (OUTPATIENT)
Dept: GASTROENTEROLOGY | Age: 58
End: 2023-04-17

## 2023-04-17 VITALS — HEIGHT: 63 IN | OXYGEN SATURATION: 99 % | WEIGHT: 200.6 LBS | HEART RATE: 82 BPM | BODY MASS INDEX: 35.54 KG/M2

## 2023-04-17 DIAGNOSIS — R10.11 RIGHT UPPER QUADRANT ABDOMINAL PAIN: Primary | ICD-10-CM

## 2023-04-17 DIAGNOSIS — R79.89 ELEVATED LFTS: ICD-10-CM

## 2023-04-17 DIAGNOSIS — R19.7 INTERMITTENT DIARRHEA: ICD-10-CM

## 2023-04-17 DIAGNOSIS — R11.2 NAUSEA AND VOMITING, UNSPECIFIED VOMITING TYPE: ICD-10-CM

## 2023-04-17 DIAGNOSIS — R76.8 HEPATITIS B CORE ANTIBODY POSITIVE: ICD-10-CM

## 2023-04-17 DIAGNOSIS — K75.81 NASH (NONALCOHOLIC STEATOHEPATITIS): ICD-10-CM

## 2023-04-17 DIAGNOSIS — E11.9 TYPE 2 DIABETES MELLITUS WITHOUT COMPLICATION, WITHOUT LONG-TERM CURRENT USE OF INSULIN (HCC): ICD-10-CM

## 2023-04-17 DIAGNOSIS — K21.9 GASTROESOPHAGEAL REFLUX DISEASE, UNSPECIFIED WHETHER ESOPHAGITIS PRESENT: ICD-10-CM

## 2023-04-17 RX ORDER — SIMETHICONE 80 MG
80 TABLET,CHEWABLE ORAL 4 TIMES DAILY PRN
Qty: 180 TABLET | Refills: 3 | Status: SHIPPED | OUTPATIENT
Start: 2023-04-17

## 2023-04-17 NOTE — PROGRESS NOTES
Currently no weight loss or loss of appetite. No NSAID use, alcohol or recreational drug use. FibroScan was performed in September, 2020 with low probability of fibrosis stage IV. Slightly elevated transaminases. Patient wanted to proceed with liver biopsy for definitive diagnosis. She  denies chest pain, shortness of breath, or palpitations. She denies jaundice. She denies any syncopal episodes. Previous GI work up/Endoscopic investigations:   Gastric emptying study 10/9/2021: Accelerated rate of gastric emptying. Liver biopsy 10/28/2020-mild chronic hepatitis with macrovesicular steatosis. The histologic findings are consistent with the clinical impression of Jayesh Richardson. NAFLD activity score and fibrosis staging: Steatosis score 2, lobular inflammation score 2, hepatocyte ballooning score 1, fibrosis stage IA. Colonoscopy  9/20-sigmoid diverticulosis. External hemorrhoids. Otherwise a normal exam.      EGD 9/20- patchy antral gastritis. Irregular Z line. Histology negative for HP. Review of Systems   All other systems reviewed and are negative. Past medical history, past surgical history, medication list, social and familyhistory reviewed    Pulse 82, height 5' 3\" (1.6 m), weight 200 lb 9.6 oz (91 kg), last menstrual period 03/27/2016, SpO2 99 %, not currently breastfeeding. Physical Exam  Constitutional:       General: She is not in acute distress. Appearance: Normal appearance. She is normal weight. She is not ill-appearing. HENT:      Head: Normocephalic and atraumatic. Eyes:      General: No scleral icterus. Cardiovascular:      Rate and Rhythm: Normal rate and regular rhythm. Pulses: Normal pulses. Pulmonary:      Effort: Pulmonary effort is normal. No respiratory distress. Breath sounds: Normal breath sounds. Abdominal:      General: Bowel sounds are normal. There is no distension. Palpations: Abdomen is soft. There is no mass. Tenderness:  There is

## 2023-04-25 ENCOUNTER — APPOINTMENT (OUTPATIENT)
Dept: CT IMAGING | Age: 58
End: 2023-04-25
Payer: MEDICAID

## 2023-04-25 ENCOUNTER — HOSPITAL ENCOUNTER (EMERGENCY)
Age: 58
Discharge: HOME | End: 2023-04-25
Payer: MEDICAID

## 2023-04-25 VITALS
HEART RATE: 79 BPM | WEIGHT: 200 LBS | RESPIRATION RATE: 16 BRPM | DIASTOLIC BLOOD PRESSURE: 73 MMHG | OXYGEN SATURATION: 98 % | SYSTOLIC BLOOD PRESSURE: 129 MMHG | BODY MASS INDEX: 35.43 KG/M2 | TEMPERATURE: 97.6 F

## 2023-04-25 DIAGNOSIS — R11.2 NAUSEA AND VOMITING, UNSPECIFIED VOMITING TYPE: ICD-10-CM

## 2023-04-25 DIAGNOSIS — R10.11 RIGHT UPPER QUADRANT ABDOMINAL PAIN: Primary | ICD-10-CM

## 2023-04-25 LAB
ALBUMIN SERPL-MCNC: 4.6 G/DL (ref 3.5–4.6)
ALP SERPL-CCNC: 151 U/L (ref 40–130)
ALT SERPL-CCNC: 47 U/L (ref 0–33)
ANION GAP SERPL CALCULATED.3IONS-SCNC: 14 MEQ/L (ref 9–15)
AST SERPL-CCNC: 63 U/L (ref 0–35)
BASOPHILS # BLD: 0.1 K/UL (ref 0–0.2)
BASOPHILS NFR BLD: 1.2 %
BILIRUB SERPL-MCNC: 0.3 MG/DL (ref 0.2–0.7)
BILIRUB UR QL STRIP: NEGATIVE
BUN SERPL-MCNC: 16 MG/DL (ref 6–20)
CALCIUM SERPL-MCNC: 9.8 MG/DL (ref 8.5–9.9)
CHLORIDE SERPL-SCNC: 100 MEQ/L (ref 95–107)
CLARITY UR: ABNORMAL
CO2 SERPL-SCNC: 26 MEQ/L (ref 20–31)
COLOR UR: YELLOW
CREAT SERPL-MCNC: 0.87 MG/DL (ref 0.5–0.9)
EOSINOPHIL # BLD: 0.2 K/UL (ref 0–0.7)
EOSINOPHIL NFR BLD: 1.7 %
ERYTHROCYTE [DISTWIDTH] IN BLOOD BY AUTOMATED COUNT: 14.9 % (ref 11.5–14.5)
GLOBULIN SER CALC-MCNC: 3.6 G/DL (ref 2.3–3.5)
GLUCOSE SERPL-MCNC: 133 MG/DL (ref 70–99)
GLUCOSE UR STRIP-MCNC: NEGATIVE MG/DL
HCT VFR BLD AUTO: 43 % (ref 37–47)
HGB BLD-MCNC: 14.2 G/DL (ref 12–16)
HGB UR QL STRIP: NEGATIVE
KETONES UR STRIP-MCNC: NEGATIVE MG/DL
LEUKOCYTE ESTERASE UR QL STRIP: NEGATIVE
LIPASE SERPL-CCNC: 39 U/L (ref 12–95)
LYMPHOCYTES # BLD: 2.4 K/UL (ref 1–4.8)
LYMPHOCYTES NFR BLD: 23.8 %
MCH RBC QN AUTO: 28.4 PG (ref 27–31.3)
MCHC RBC AUTO-ENTMCNC: 33 % (ref 33–37)
MCV RBC AUTO: 85.8 FL (ref 79.4–94.8)
MONOCYTES # BLD: 0.6 K/UL (ref 0.2–0.8)
MONOCYTES NFR BLD: 6.2 %
NEUTROPHILS # BLD: 6.9 K/UL (ref 1.4–6.5)
NEUTS SEG NFR BLD: 67.1 %
NITRITE UR QL STRIP: NEGATIVE
PERFORMED ON: NORMAL
PH UR STRIP: 5.5 [PH] (ref 5–9)
PLATELET # BLD AUTO: 347 K/UL (ref 130–400)
POC CREATININE WHOLE BLOOD: 0.9
POC CREATININE: 0.9 MG/DL (ref 0.6–1.2)
POC SAMPLE TYPE: NORMAL
POTASSIUM SERPL-SCNC: 3.9 MEQ/L (ref 3.4–4.9)
PROT SERPL-MCNC: 8.2 G/DL (ref 6.3–8)
PROT UR STRIP-MCNC: NEGATIVE MG/DL
RBC # BLD AUTO: 5.01 M/UL (ref 4.2–5.4)
SODIUM SERPL-SCNC: 140 MEQ/L (ref 135–144)
SP GR UR STRIP: 1.01 (ref 1–1.03)
URINE REFLEX TO CULTURE: ABNORMAL
UROBILINOGEN UR STRIP-ACNC: 0.2 E.U./DL
WBC # BLD AUTO: 10.2 K/UL (ref 4.8–10.8)

## 2023-04-25 PROCEDURE — 6360000004 HC RX CONTRAST MEDICATION: Performed by: PHYSICIAN ASSISTANT

## 2023-04-25 PROCEDURE — 80053 COMPREHEN METABOLIC PANEL: CPT

## 2023-04-25 PROCEDURE — 36415 COLL VENOUS BLD VENIPUNCTURE: CPT

## 2023-04-25 PROCEDURE — 6360000002 HC RX W HCPCS: Performed by: PHYSICIAN ASSISTANT

## 2023-04-25 PROCEDURE — 96375 TX/PRO/DX INJ NEW DRUG ADDON: CPT

## 2023-04-25 PROCEDURE — 99285 EMERGENCY DEPT VISIT HI MDM: CPT

## 2023-04-25 PROCEDURE — 85025 COMPLETE CBC W/AUTO DIFF WBC: CPT

## 2023-04-25 PROCEDURE — 83690 ASSAY OF LIPASE: CPT

## 2023-04-25 PROCEDURE — 96374 THER/PROPH/DIAG INJ IV PUSH: CPT

## 2023-04-25 PROCEDURE — 74177 CT ABD & PELVIS W/CONTRAST: CPT

## 2023-04-25 PROCEDURE — 81003 URINALYSIS AUTO W/O SCOPE: CPT

## 2023-04-25 RX ORDER — ONDANSETRON 4 MG/1
4 TABLET, FILM COATED ORAL EVERY 8 HOURS PRN
Qty: 20 TABLET | Refills: 0 | Status: SHIPPED | OUTPATIENT
Start: 2023-04-25

## 2023-04-25 RX ORDER — KETOROLAC TROMETHAMINE 30 MG/ML
30 INJECTION, SOLUTION INTRAMUSCULAR; INTRAVENOUS ONCE
Status: COMPLETED | OUTPATIENT
Start: 2023-04-25 | End: 2023-04-25

## 2023-04-25 RX ORDER — MELOXICAM 7.5 MG/1
7.5 TABLET ORAL DAILY
Qty: 7 TABLET | Refills: 0 | Status: SHIPPED | OUTPATIENT
Start: 2023-04-25

## 2023-04-25 RX ORDER — MORPHINE SULFATE 4 MG/ML
4 INJECTION, SOLUTION INTRAMUSCULAR; INTRAVENOUS ONCE
Status: COMPLETED | OUTPATIENT
Start: 2023-04-25 | End: 2023-04-25

## 2023-04-25 RX ORDER — ONDANSETRON 2 MG/ML
4 INJECTION INTRAMUSCULAR; INTRAVENOUS ONCE
Status: COMPLETED | OUTPATIENT
Start: 2023-04-25 | End: 2023-04-25

## 2023-04-25 RX ORDER — DICYCLOMINE HYDROCHLORIDE 10 MG/1
10 CAPSULE ORAL 4 TIMES DAILY
Qty: 20 CAPSULE | Refills: 0 | Status: SHIPPED | OUTPATIENT
Start: 2023-04-25

## 2023-04-25 RX ADMIN — ONDANSETRON 4 MG: 2 INJECTION INTRAMUSCULAR; INTRAVENOUS at 18:20

## 2023-04-25 RX ADMIN — MORPHINE SULFATE 4 MG: 4 INJECTION, SOLUTION INTRAMUSCULAR; INTRAVENOUS at 18:21

## 2023-04-25 RX ADMIN — IOPAMIDOL 50 ML: 612 INJECTION, SOLUTION INTRAVENOUS at 18:55

## 2023-04-25 RX ADMIN — KETOROLAC TROMETHAMINE 30 MG: 30 INJECTION, SOLUTION INTRAMUSCULAR; INTRAVENOUS at 20:15

## 2023-04-25 ASSESSMENT — PAIN DESCRIPTION - ORIENTATION: ORIENTATION: RIGHT;LOWER

## 2023-04-25 ASSESSMENT — ENCOUNTER SYMPTOMS
VOICE CHANGE: 0
ANAL BLEEDING: 0
ABDOMINAL PAIN: 1
BLOOD IN STOOL: 0
BACK PAIN: 1
NAUSEA: 1
VOMITING: 1
COUGH: 0
EYE DISCHARGE: 0
APNEA: 0
DIARRHEA: 1
ABDOMINAL DISTENTION: 0

## 2023-04-25 ASSESSMENT — PAIN SCALES - GENERAL
PAINLEVEL_OUTOF10: 9
PAINLEVEL_OUTOF10: 9
PAINLEVEL_OUTOF10: 6

## 2023-04-25 ASSESSMENT — PAIN - FUNCTIONAL ASSESSMENT: PAIN_FUNCTIONAL_ASSESSMENT: 0-10

## 2023-04-25 ASSESSMENT — PAIN DESCRIPTION - DESCRIPTORS: DESCRIPTORS: BURNING;SHARP

## 2023-04-25 ASSESSMENT — PAIN DESCRIPTION - LOCATION
LOCATION: ABDOMEN
LOCATION: ABDOMEN

## 2023-04-25 NOTE — ED NOTES
Patient states her pain is decreasing. Respirations even and unlabored.         Rob Chen RN  04/25/23 [id number]

## 2023-04-25 NOTE — ED NOTES
Patient resting in bed complaining of RLQ ABD pain rating 10/10. Patient states she has had 2 days of diarrhea for which she took one immodium.         General Gopi RN  04/25/23 8299

## 2023-04-25 NOTE — ED TRIAGE NOTES
Pt to ER today c/o RT flank and ABD pain that started 4 days ago. Pt reported she has had n/v/d denied fevers  Pt reported the pain feels burning and sharp. Pt is accompanied with family member. Pt reported 9/10 pain.

## 2023-04-25 NOTE — ED PROVIDER NOTES
[facility] ED  eMERGENCY dEPARTMENT eNCOUnter      Pt Name: Luiza Solis  MRN: [id number]  [place] 1965  Date of evaluation: 4/25/2023  Provider: Batsheva Diaz PA-C    CHIEF COMPLAINT       Chief Complaint   Patient presents with    Abdominal Pain         HISTORY OF PRESENT ILLNESS   (Location/Symptom, Timing/Onset,Context/Setting, Quality, Duration, Modifying Factors, Severity)  Note limiting factors. Luiza Solis is a 65 y.o. female who presents to the emergency department pt presents with 4 dya hx abd/flank and back pain with hx diabetes and liver issues. She denies fever chills/rectal bleeding dysuria/hematuria. Symptoms mild to moderate in severity nothing improves sx touch and motion worsens sx. HPI    NursingNotes were reviewed. REVIEW OF SYSTEMS    (2-9 systems for level 4, 10 or more for level 5)     Review of Systems   Constitutional:  Negative for activity change, appetite change, fever and unexpected weight change. HENT:  Negative for ear discharge, nosebleeds and voice change. Eyes:  Negative for discharge. Respiratory:  Negative for apnea and cough. Cardiovascular:  Negative for chest pain. Gastrointestinal:  Positive for abdominal pain, diarrhea, nausea and vomiting. Negative for abdominal distention, anal bleeding and blood in stool. Genitourinary:  Positive for flank pain. Negative for dysuria. Musculoskeletal:  Positive for back pain. Negative for joint swelling. Skin:  Negative for pallor. Neurological:  Negative for seizures and facial asymmetry. Hematological:  Does not bruise/bleed easily. Psychiatric/Behavioral:  Negative for behavioral problems, self-injury and sleep disturbance. All other systems reviewed and are negative. Except as noted above the remainder of the review of systems was reviewed and negative.        PAST MEDICAL HISTORY     Past Medical History:   Diagnosis Date    Arthritis     Bipolar disorder ([facility] [address]     DM

## 2023-04-26 NOTE — DISCHARGE INSTRUCTIONS
Clear liquid diet for 24 hours including water, Jell-O, pops, clear broth, Gatorade follow-up primary care return to if any symptoms worsen or new symptoms develop. Sreedhar Parada

## 2023-07-13 RX ORDER — OMEPRAZOLE 40 MG/1
40 CAPSULE, DELAYED RELEASE ORAL DAILY
Qty: 30 CAPSULE | Refills: 3 | Status: SHIPPED | OUTPATIENT
Start: 2023-07-13

## 2023-08-12 ENCOUNTER — APPOINTMENT (OUTPATIENT)
Dept: CT IMAGING | Age: 58
End: 2023-08-12
Payer: MEDICAID

## 2023-08-12 ENCOUNTER — HOSPITAL ENCOUNTER (EMERGENCY)
Age: 58
Discharge: HOME | End: 2023-08-12
Payer: MEDICAID

## 2023-08-12 VITALS
WEIGHT: 200 LBS | HEIGHT: 63 IN | RESPIRATION RATE: 17 BRPM | OXYGEN SATURATION: 99 % | BODY MASS INDEX: 35.44 KG/M2 | TEMPERATURE: 98.6 F | SYSTOLIC BLOOD PRESSURE: 139 MMHG | DIASTOLIC BLOOD PRESSURE: 68 MMHG | HEART RATE: 72 BPM

## 2023-08-12 DIAGNOSIS — R10.9 ABDOMINAL PAIN, UNSPECIFIED ABDOMINAL LOCATION: ICD-10-CM

## 2023-08-12 DIAGNOSIS — R19.7 NAUSEA VOMITING AND DIARRHEA: Primary | ICD-10-CM

## 2023-08-12 DIAGNOSIS — R11.2 NAUSEA VOMITING AND DIARRHEA: Primary | ICD-10-CM

## 2023-08-12 LAB
ADV 40+41 DNA STL QL NAA+NON-PROBE: NOT DETECTED
ALBUMIN SERPL-MCNC: 4.4 G/DL (ref 3.5–4.6)
ALP SERPL-CCNC: 120 U/L (ref 40–130)
ALT SERPL-CCNC: 30 U/L (ref 0–33)
ANION GAP SERPL CALCULATED.3IONS-SCNC: 12 MEQ/L (ref 9–15)
AST SERPL-CCNC: 32 U/L (ref 0–35)
BASOPHILS # BLD: 0.1 K/UL (ref 0–0.2)
BASOPHILS NFR BLD: 1 %
BILIRUB SERPL-MCNC: 0.4 MG/DL (ref 0.2–0.7)
BILIRUB UR QL STRIP: NEGATIVE
BUN SERPL-MCNC: 13 MG/DL (ref 6–20)
C CAYETANENSIS DNA STL QL NAA+NON-PROBE: NOT DETECTED
C COLI+JEJ+UPSA DNA STL QL NAA+NON-PROBE: NOT DETECTED
CALCIUM SERPL-MCNC: 10 MG/DL (ref 8.5–9.9)
CHLORIDE SERPL-SCNC: 98 MEQ/L (ref 95–107)
CLARITY UR: CLEAR
CO2 SERPL-SCNC: 31 MEQ/L (ref 20–31)
COLOR UR: YELLOW
CREAT SERPL-MCNC: 0.99 MG/DL (ref 0.5–0.9)
CRYPTOSP DNA STL QL NAA+NON-PROBE: NOT DETECTED
E HISTOLYT DNA STL QL NAA+NON-PROBE: NOT DETECTED
EAEC PAA PLAS AGGR+AATA ST NAA+NON-PRB: NOT DETECTED
EC STX1+STX2 GENES STL QL NAA+NON-PROBE: NOT DETECTED
EKG ATRIAL RATE: 84 BPM
EKG P AXIS: 43 DEGREES
EKG P-R INTERVAL: 142 MS
EKG Q-T INTERVAL: 374 MS
EKG QRS DURATION: 72 MS
EKG QTC CALCULATION (BAZETT): 441 MS
EKG R AXIS: 13 DEGREES
EKG T AXIS: 35 DEGREES
EKG VENTRICULAR RATE: 84 BPM
EOSINOPHIL # BLD: 0.3 K/UL (ref 0–0.7)
EOSINOPHIL NFR BLD: 3.2 %
EPEC EAE GENE STL QL NAA+NON-PROBE: NOT DETECTED
ERYTHROCYTE [DISTWIDTH] IN BLOOD BY AUTOMATED COUNT: 15 % (ref 11.5–14.5)
ETEC LTA+ST1A+ST1B TOX ST NAA+NON-PROBE: NOT DETECTED
G LAMBLIA AG STL QL IA: NORMAL
G LAMBLIA DNA STL QL NAA+NON-PROBE: NOT DETECTED
GI PATH DNA+RNA PNL STL NAA+NON-PROBE: NOT DETECTED
GLOBULIN SER CALC-MCNC: 3.3 G/DL (ref 2.3–3.5)
GLUCOSE SERPL-MCNC: 193 MG/DL (ref 70–99)
GLUCOSE UR STRIP-MCNC: NEGATIVE MG/DL
HCT VFR BLD AUTO: 40.4 % (ref 37–47)
HEMOCCULT STL QL IA: ABNORMAL
HGB BLD-MCNC: 13.2 G/DL (ref 12–16)
HGB UR QL STRIP: NEGATIVE
KETONES UR STRIP-MCNC: NEGATIVE MG/DL
LACTATE BLDV-SCNC: 2.6 MMOL/L (ref 0.5–2.2)
LACTATE BLDV-SCNC: 3 MMOL/L (ref 0.5–2.2)
LEUKOCYTE ESTERASE UR QL STRIP: NEGATIVE
LIPASE SERPL-CCNC: 34 U/L (ref 12–95)
LYMPHOCYTES # BLD: 3.4 K/UL (ref 1–4.8)
LYMPHOCYTES NFR BLD: 32.8 %
MAGNESIUM SERPL-MCNC: 1.8 MG/DL (ref 1.7–2.4)
MCH RBC QN AUTO: 28.6 PG (ref 27–31.3)
MCHC RBC AUTO-ENTMCNC: 32.7 % (ref 33–37)
MCV RBC AUTO: 87.5 FL (ref 79.4–94.8)
MONOCYTES # BLD: 0.9 K/UL (ref 0.2–0.8)
MONOCYTES NFR BLD: 9 %
NEUTROPHILS # BLD: 5.6 K/UL (ref 1.4–6.5)
NEUTS SEG NFR BLD: 54 %
NITRITE UR QL STRIP: NEGATIVE
NOROVIRUS GI+II RNA STL QL NAA+NON-PROBE: NOT DETECTED
P SHIGELLOIDES DNA STL QL NAA+NON-PROBE: NOT DETECTED
PH UR STRIP: 7 [PH] (ref 5–9)
PLATELET # BLD AUTO: 307 K/UL (ref 130–400)
POC CREATININE WHOLE BLOOD: 1
POTASSIUM SERPL-SCNC: 4.1 MEQ/L (ref 3.4–4.9)
PROT SERPL-MCNC: 7.7 G/DL (ref 6.3–8)
PROT UR STRIP-MCNC: NEGATIVE MG/DL
RBC # BLD AUTO: 4.62 M/UL (ref 4.2–5.4)
REASON FOR REJECTION: NORMAL
REJECTED TEST: NORMAL
RVA RNA STL QL NAA+NON-PROBE: NOT DETECTED
S ENT+BONG DNA STL QL NAA+NON-PROBE: NOT DETECTED
SAPO I+II+IV+V RNA STL QL NAA+NON-PROBE: NOT DETECTED
SARS-COV-2 RDRP RESP QL NAA+PROBE: NOT DETECTED
SHIGELLA SP+EIEC IPAH ST NAA+NON-PROBE: NOT DETECTED
SODIUM SERPL-SCNC: 141 MEQ/L (ref 135–144)
SP GR UR STRIP: 1.05 (ref 1–1.03)
TROPONIN T SERPL-MCNC: <0.01 NG/ML (ref 0–0.01)
URINE REFLEX TO CULTURE: NORMAL
UROBILINOGEN UR STRIP-ACNC: 0.2 E.U./DL
V CHOL+PARA+VUL DNA STL QL NAA+NON-PROBE: NOT DETECTED
V CHOLERAE DNA STL QL NAA+NON-PROBE: NOT DETECTED
WBC # BLD AUTO: 10.3 K/UL (ref 4.8–10.8)
Y ENTEROCOL DNA STL QL NAA+NON-PROBE: NOT DETECTED

## 2023-08-12 PROCEDURE — 81003 URINALYSIS AUTO W/O SCOPE: CPT

## 2023-08-12 PROCEDURE — 96375 TX/PRO/DX INJ NEW DRUG ADDON: CPT

## 2023-08-12 PROCEDURE — 83690 ASSAY OF LIPASE: CPT

## 2023-08-12 PROCEDURE — 99285 EMERGENCY DEPT VISIT HI MDM: CPT

## 2023-08-12 PROCEDURE — 96374 THER/PROPH/DIAG INJ IV PUSH: CPT

## 2023-08-12 PROCEDURE — 83605 ASSAY OF LACTIC ACID: CPT

## 2023-08-12 PROCEDURE — 93010 ELECTROCARDIOGRAM REPORT: CPT | Performed by: INTERNAL MEDICINE

## 2023-08-12 PROCEDURE — 96361 HYDRATE IV INFUSION ADD-ON: CPT

## 2023-08-12 PROCEDURE — 93005 ELECTROCARDIOGRAM TRACING: CPT | Performed by: STUDENT IN AN ORGANIZED HEALTH CARE EDUCATION/TRAINING PROGRAM

## 2023-08-12 PROCEDURE — 2580000003 HC RX 258: Performed by: STUDENT IN AN ORGANIZED HEALTH CARE EDUCATION/TRAINING PROGRAM

## 2023-08-12 PROCEDURE — 82274 ASSAY TEST FOR BLOOD FECAL: CPT

## 2023-08-12 PROCEDURE — 87635 SARS-COV-2 COVID-19 AMP PRB: CPT

## 2023-08-12 PROCEDURE — 96372 THER/PROPH/DIAG INJ SC/IM: CPT

## 2023-08-12 PROCEDURE — 6360000004 HC RX CONTRAST MEDICATION: Performed by: RADIOLOGY

## 2023-08-12 PROCEDURE — 96376 TX/PRO/DX INJ SAME DRUG ADON: CPT

## 2023-08-12 PROCEDURE — 80053 COMPREHEN METABOLIC PANEL: CPT

## 2023-08-12 PROCEDURE — 87507 IADNA-DNA/RNA PROBE TQ 12-25: CPT

## 2023-08-12 PROCEDURE — 36415 COLL VENOUS BLD VENIPUNCTURE: CPT

## 2023-08-12 PROCEDURE — 83735 ASSAY OF MAGNESIUM: CPT

## 2023-08-12 PROCEDURE — 87329 GIARDIA AG IA: CPT

## 2023-08-12 PROCEDURE — 84484 ASSAY OF TROPONIN QUANT: CPT

## 2023-08-12 PROCEDURE — 74177 CT ABD & PELVIS W/CONTRAST: CPT

## 2023-08-12 PROCEDURE — 6360000002 HC RX W HCPCS: Performed by: STUDENT IN AN ORGANIZED HEALTH CARE EDUCATION/TRAINING PROGRAM

## 2023-08-12 PROCEDURE — 85025 COMPLETE CBC W/AUTO DIFF WBC: CPT

## 2023-08-12 RX ORDER — DICYCLOMINE HYDROCHLORIDE 10 MG/ML
20 INJECTION INTRAMUSCULAR ONCE
Status: COMPLETED | OUTPATIENT
Start: 2023-08-12 | End: 2023-08-12

## 2023-08-12 RX ORDER — ONDANSETRON 4 MG/1
4 TABLET, ORALLY DISINTEGRATING ORAL EVERY 8 HOURS PRN
Qty: 21 TABLET | Refills: 0 | Status: SHIPPED | OUTPATIENT
Start: 2023-08-12 | End: 2023-08-19

## 2023-08-12 RX ORDER — MORPHINE SULFATE 4 MG/ML
4 INJECTION, SOLUTION INTRAMUSCULAR; INTRAVENOUS ONCE
Status: COMPLETED | OUTPATIENT
Start: 2023-08-12 | End: 2023-08-12

## 2023-08-12 RX ORDER — DICYCLOMINE HCL 20 MG
20 TABLET ORAL 4 TIMES DAILY PRN
Qty: 56 TABLET | Refills: 0 | Status: SHIPPED | OUTPATIENT
Start: 2023-08-12 | End: 2023-08-26

## 2023-08-12 RX ORDER — ONDANSETRON 2 MG/ML
4 INJECTION INTRAMUSCULAR; INTRAVENOUS ONCE
Status: COMPLETED | OUTPATIENT
Start: 2023-08-12 | End: 2023-08-12

## 2023-08-12 RX ORDER — 0.9 % SODIUM CHLORIDE 0.9 %
1000 INTRAVENOUS SOLUTION INTRAVENOUS ONCE
Status: COMPLETED | OUTPATIENT
Start: 2023-08-12 | End: 2023-08-12

## 2023-08-12 RX ADMIN — MORPHINE SULFATE 4 MG: 4 INJECTION, SOLUTION INTRAMUSCULAR; INTRAVENOUS at 09:14

## 2023-08-12 RX ADMIN — MORPHINE SULFATE 4 MG: 4 INJECTION, SOLUTION INTRAMUSCULAR; INTRAVENOUS at 07:13

## 2023-08-12 RX ADMIN — IOPAMIDOL 65 ML: 612 INJECTION, SOLUTION INTRAVENOUS at 07:29

## 2023-08-12 RX ADMIN — ONDANSETRON 4 MG: 2 INJECTION INTRAMUSCULAR; INTRAVENOUS at 07:12

## 2023-08-12 RX ADMIN — SODIUM CHLORIDE 1000 ML: 9 INJECTION, SOLUTION INTRAVENOUS at 07:10

## 2023-08-12 RX ADMIN — DICYCLOMINE HYDROCHLORIDE 20 MG: 10 INJECTION, SOLUTION INTRAMUSCULAR at 07:15

## 2023-08-12 ASSESSMENT — ENCOUNTER SYMPTOMS
VOMITING: 1
WHEEZING: 0
COUGH: 0
ABDOMINAL PAIN: 1
CONSTIPATION: 0
DIARRHEA: 1
ANAL BLEEDING: 0
BLOOD IN STOOL: 0
SHORTNESS OF BREATH: 0
NAUSEA: 1
PHOTOPHOBIA: 0
RECTAL PAIN: 0
ABDOMINAL DISTENTION: 0

## 2023-08-12 ASSESSMENT — PAIN SCALES - GENERAL: PAINLEVEL_OUTOF10: 8

## 2023-08-12 ASSESSMENT — LIFESTYLE VARIABLES
HOW MANY STANDARD DRINKS CONTAINING ALCOHOL DO YOU HAVE ON A TYPICAL DAY: PATIENT DOES NOT DRINK
HOW OFTEN DO YOU HAVE A DRINK CONTAINING ALCOHOL: NEVER

## 2023-08-12 ASSESSMENT — PAIN - FUNCTIONAL ASSESSMENT: PAIN_FUNCTIONAL_ASSESSMENT: 0-10

## 2023-08-12 ASSESSMENT — PAIN DESCRIPTION - LOCATION: LOCATION: ABDOMEN

## 2023-08-12 NOTE — ED PROVIDER NOTES
[facility] ED  EMERGENCY DEPARTMENT ENCOUNTER      Pt Name: Leida Ho  MRN: [id number]  [address] [facility] 1965  Date of evaluation: 8/12/2023  Provider: Judie Lisa       Chief Complaint   Patient presents with    Abdominal Pain    Nausea    Diarrhea         HISTORY OF PRESENT ILLNESS   (Location/Symptom, Timing/Onset, Context/Setting, Quality, Duration, Modifying Factors, Severity)  Note limiting factors. Leida Ho is a 63 y.o. female who per chart review has a past medical history of hypertension dyslipidemia hypothyroidism GERD arthritis fibromyalgia depression bipolar 2 disorder vitamin D deficiency type 2 diabetes chronic constipation presents to the emergency department for evaluation of abdominal pain nausea and diarrhea. Patient states that symptoms started about 1 week ago initially improved until 2 days ago. She reports several episodes of nonbloody diarrhea per day. Associated diffuse intermittent abdominal cramping worse in the periumbilical region. She has tried Mylanta and omeprazole without relief of her symptoms. She states she had similar symptoms several years ago was diagnosed with a virus. She denies aggravating and alleviating factors. There is no associated nausea and 2 episodes of nonbloody nonbilious emesis. She denies recent travel antibiotic use sick contact exposure to undercooked or spoiled foods. She is still eating and drinking. She states she has a very mild frontal throbbing nonradiating headache. HA not sudden onset, not worst of life. No recent head injuries. She denies fever chills chest pain shortness of breath upper respiratory symptoms back or flank pain constipation urinary symptoms dizziness or lightheadedness abdominal distention pelvic pain. HPI    Nursing Notes were reviewed.     REVIEW OF SYSTEMS    (2-9 systems for level 4, 10 or more for level 5)     Review of Systems   Constitutional:  Negative for chills, fatigue

## 2023-08-12 NOTE — ED TRIAGE NOTES
Patient presents to ED for stomach pain, nausea, and diarrhea for 2 day. Pain comes and goes.   Patient states she has taken Mylanta and Omeprazole

## 2023-08-14 LAB
PERFORMED ON: NORMAL
POC CREATININE: 1 MG/DL (ref 0.6–1.2)
POC SAMPLE TYPE: NORMAL

## 2023-08-18 ENCOUNTER — APPOINTMENT (OUTPATIENT)
Dept: GENERAL RADIOLOGY | Age: 58
End: 2023-08-18
Payer: MEDICAID

## 2023-08-18 ENCOUNTER — APPOINTMENT (OUTPATIENT)
Dept: CT IMAGING | Age: 58
End: 2023-08-18
Attending: EMERGENCY MEDICINE
Payer: MEDICAID

## 2023-08-18 ENCOUNTER — HOSPITAL ENCOUNTER (EMERGENCY)
Age: 58
Discharge: HOME | End: 2023-08-18
Attending: EMERGENCY MEDICINE
Payer: MEDICAID

## 2023-08-18 VITALS
SYSTOLIC BLOOD PRESSURE: 124 MMHG | HEIGHT: 63 IN | DIASTOLIC BLOOD PRESSURE: 83 MMHG | BODY MASS INDEX: 35.44 KG/M2 | OXYGEN SATURATION: 94 % | HEART RATE: 79 BPM | WEIGHT: 200 LBS | TEMPERATURE: 98.7 F | RESPIRATION RATE: 17 BRPM

## 2023-08-18 DIAGNOSIS — R10.13 ABDOMINAL PAIN, EPIGASTRIC: ICD-10-CM

## 2023-08-18 DIAGNOSIS — R07.89 ATYPICAL CHEST PAIN: Primary | ICD-10-CM

## 2023-08-18 LAB
ALBUMIN SERPL-MCNC: 4.2 G/DL (ref 3.5–4.6)
ALP SERPL-CCNC: 110 U/L (ref 40–130)
ALT SERPL-CCNC: 29 U/L (ref 0–33)
ANION GAP SERPL CALCULATED.3IONS-SCNC: 10 MEQ/L (ref 9–15)
AST SERPL-CCNC: 38 U/L (ref 0–35)
BASOPHILS # BLD: 0.1 K/UL (ref 0–0.2)
BASOPHILS NFR BLD: 1.2 %
BILIRUB SERPL-MCNC: 0.3 MG/DL (ref 0.2–0.7)
BUN SERPL-MCNC: 12 MG/DL (ref 6–20)
CALCIUM SERPL-MCNC: 9.1 MG/DL (ref 8.5–9.9)
CHLORIDE SERPL-SCNC: 100 MEQ/L (ref 95–107)
CO2 SERPL-SCNC: 28 MEQ/L (ref 20–31)
CREAT SERPL-MCNC: 0.72 MG/DL (ref 0.5–0.9)
EKG ATRIAL RATE: 72 BPM
EKG P AXIS: 58 DEGREES
EKG P-R INTERVAL: 154 MS
EKG Q-T INTERVAL: 412 MS
EKG QRS DURATION: 84 MS
EKG QTC CALCULATION (BAZETT): 451 MS
EKG R AXIS: 21 DEGREES
EKG T AXIS: 31 DEGREES
EKG VENTRICULAR RATE: 72 BPM
EOSINOPHIL # BLD: 0.2 K/UL (ref 0–0.7)
EOSINOPHIL NFR BLD: 2.2 %
ERYTHROCYTE [DISTWIDTH] IN BLOOD BY AUTOMATED COUNT: 14.7 % (ref 11.5–14.5)
GLOBULIN SER CALC-MCNC: 3.4 G/DL (ref 2.3–3.5)
GLUCOSE SERPL-MCNC: 151 MG/DL (ref 70–99)
HCT VFR BLD AUTO: 38.3 % (ref 37–47)
HGB BLD-MCNC: 12.8 G/DL (ref 12–16)
INR PPP: 0.9
LYMPHOCYTES # BLD: 1.8 K/UL (ref 1–4.8)
LYMPHOCYTES NFR BLD: 23 %
MCH RBC QN AUTO: 29 PG (ref 27–31.3)
MCHC RBC AUTO-ENTMCNC: 33.4 % (ref 33–37)
MCV RBC AUTO: 86.6 FL (ref 79.4–94.8)
MONOCYTES # BLD: 0.6 K/UL (ref 0.2–0.8)
MONOCYTES NFR BLD: 7.7 %
NEUTROPHILS # BLD: 5.2 K/UL (ref 1.4–6.5)
NEUTS SEG NFR BLD: 65.9 %
PLATELET # BLD AUTO: 256 K/UL (ref 130–400)
POTASSIUM SERPL-SCNC: 4.2 MEQ/L (ref 3.4–4.9)
PROT SERPL-MCNC: 7.6 G/DL (ref 6.3–8)
PROTHROMBIN TIME: 12.4 SEC (ref 12.3–14.9)
RBC # BLD AUTO: 4.42 M/UL (ref 4.2–5.4)
SODIUM SERPL-SCNC: 138 MEQ/L (ref 135–144)
TROPONIN T SERPL-MCNC: <0.01 NG/ML (ref 0–0.01)
TROPONIN T SERPL-MCNC: <0.01 NG/ML (ref 0–0.01)
TSH SERPL-MCNC: 0.39 UIU/ML (ref 0.44–3.86)
WBC # BLD AUTO: 7.9 K/UL (ref 4.8–10.8)

## 2023-08-18 PROCEDURE — 84484 ASSAY OF TROPONIN QUANT: CPT

## 2023-08-18 PROCEDURE — 84443 ASSAY THYROID STIM HORMONE: CPT

## 2023-08-18 PROCEDURE — 85025 COMPLETE CBC W/AUTO DIFF WBC: CPT

## 2023-08-18 PROCEDURE — 6370000000 HC RX 637 (ALT 250 FOR IP): Performed by: EMERGENCY MEDICINE

## 2023-08-18 PROCEDURE — 96374 THER/PROPH/DIAG INJ IV PUSH: CPT

## 2023-08-18 PROCEDURE — 36415 COLL VENOUS BLD VENIPUNCTURE: CPT

## 2023-08-18 PROCEDURE — 93005 ELECTROCARDIOGRAM TRACING: CPT | Performed by: EMERGENCY MEDICINE

## 2023-08-18 PROCEDURE — 6360000002 HC RX W HCPCS: Performed by: EMERGENCY MEDICINE

## 2023-08-18 PROCEDURE — 70450 CT HEAD/BRAIN W/O DYE: CPT

## 2023-08-18 PROCEDURE — 99285 EMERGENCY DEPT VISIT HI MDM: CPT

## 2023-08-18 PROCEDURE — 80053 COMPREHEN METABOLIC PANEL: CPT

## 2023-08-18 PROCEDURE — 96375 TX/PRO/DX INJ NEW DRUG ADDON: CPT

## 2023-08-18 PROCEDURE — 71045 X-RAY EXAM CHEST 1 VIEW: CPT

## 2023-08-18 PROCEDURE — 85610 PROTHROMBIN TIME: CPT

## 2023-08-18 RX ORDER — NITROGLYCERIN 0.4 MG/1
0.4 TABLET SUBLINGUAL EVERY 5 MIN PRN
Status: DISCONTINUED | OUTPATIENT
Start: 2023-08-18 | End: 2023-08-18 | Stop reason: HOSPADM

## 2023-08-18 RX ORDER — METOCLOPRAMIDE HYDROCHLORIDE 5 MG/ML
10 INJECTION INTRAMUSCULAR; INTRAVENOUS ONCE
Status: COMPLETED | OUTPATIENT
Start: 2023-08-18 | End: 2023-08-18

## 2023-08-18 RX ORDER — DIPHENHYDRAMINE HYDROCHLORIDE 50 MG/ML
12.5 INJECTION INTRAMUSCULAR; INTRAVENOUS ONCE
Status: COMPLETED | OUTPATIENT
Start: 2023-08-18 | End: 2023-08-18

## 2023-08-18 RX ORDER — ONDANSETRON 2 MG/ML
4 INJECTION INTRAMUSCULAR; INTRAVENOUS ONCE
Status: COMPLETED | OUTPATIENT
Start: 2023-08-18 | End: 2023-08-18

## 2023-08-18 RX ADMIN — NITROGLYCERIN 0.4 MG: 0.4 TABLET, ORALLY DISINTEGRATING SUBLINGUAL at 15:36

## 2023-08-18 RX ADMIN — METOCLOPRAMIDE HYDROCHLORIDE 10 MG: 5 INJECTION INTRAMUSCULAR; INTRAVENOUS at 16:14

## 2023-08-18 RX ADMIN — NITROGLYCERIN 0.4 MG: 0.4 TABLET, ORALLY DISINTEGRATING SUBLINGUAL at 15:17

## 2023-08-18 RX ADMIN — ONDANSETRON 4 MG: 2 INJECTION INTRAMUSCULAR; INTRAVENOUS at 15:18

## 2023-08-18 RX ADMIN — DIPHENHYDRAMINE HYDROCHLORIDE 12.5 MG: 50 INJECTION INTRAMUSCULAR; INTRAVENOUS at 16:14

## 2023-08-18 RX ADMIN — NITROGLYCERIN 0.4 MG: 0.4 TABLET, ORALLY DISINTEGRATING SUBLINGUAL at 15:46

## 2023-08-18 ASSESSMENT — ENCOUNTER SYMPTOMS
EYE PAIN: 0
ABDOMINAL PAIN: 1
VOMITING: 0
SORE THROAT: 0
SHORTNESS OF BREATH: 0
NAUSEA: 0
CHEST TIGHTNESS: 0

## 2023-08-18 ASSESSMENT — PAIN SCALES - GENERAL: PAINLEVEL_OUTOF10: 10

## 2023-08-18 ASSESSMENT — PAIN - FUNCTIONAL ASSESSMENT: PAIN_FUNCTIONAL_ASSESSMENT: 0-10

## 2023-08-18 ASSESSMENT — PAIN DESCRIPTION - LOCATION: LOCATION: CHEST

## 2023-08-18 NOTE — ED NOTES
Pt states her CP dropped from a 10 out of 10 to 9 out of 10 after the first nitro, pt now C/O headache.      Alex Cat RN  08/18/23 8260

## 2023-08-18 NOTE — ED NOTES
Pt states that after the second nitro her CP decreased for 9 to 8.5, a third nitro will be given.      Tavon Brock, RN  08/18/23 8010

## 2023-08-18 NOTE — ED TRIAGE NOTES
Pt presents to ER via home with chest pain that started  half hour ago along with left side facial numbness. Pt had some nausea with three episodes of vomiting. Friend is translating for the patient this time; English speaking. Pt is A&Ox4, warm and dry.

## 2023-08-18 NOTE — ED PROVIDER NOTES
[facility] ED  EMERGENCY DEPARTMENT ENCOUNTER      Pt Name: Yaritza Frank  MRN: [id number]  [address] [facility] [place] 1965  Date of evaluation: 8/18/2023  Provider: Deana Ward DO    CHIEF COMPLAINT       Chief Complaint   Patient presents with    Chest Pain         HISTORY OF PRESENT ILLNESS   (Location/Symptom, Timing/Onset, Context/Setting, Quality, Duration, Modifying Factors, Severity)  Note limiting factors. Yaritza Frank is a 63 y.o. female who presents to the emergency department . Patient presents with epigastric/midsternal discomfort that just came while sitting at home. It is not worse with breathing and she is not short of breath. Patient has some mild nausea. She also denies palpitations. History of bipolar disorder diabetes hyperlipidemia GERD hypertension. Patient has come to the ER frequently for upper abdominal pain but tells me that this is not the same pain as usual.  She also mentioned some tingling on the left side of her face. HPI    Nursing Notes were reviewed. REVIEW OF SYSTEMS    (2-9 systems for level 4, 10 or more for level 5)     Review of Systems   Constitutional:  Negative for activity change, appetite change and fatigue. HENT:  Negative for congestion and sore throat. Eyes:  Negative for pain and visual disturbance. Respiratory:  Negative for chest tightness and shortness of breath. Cardiovascular:  Positive for chest pain. Gastrointestinal:  Positive for abdominal pain. Negative for nausea and vomiting. Endocrine: Negative for polydipsia. Genitourinary:  Negative for flank pain and urgency. Musculoskeletal:  Negative for gait problem and neck stiffness. Skin:  Negative for rash. Neurological:  Negative for weakness, light-headedness and headaches. Psychiatric/Behavioral:  Negative for confusion and sleep disturbance. Except as noted above the remainder of the review of systems was reviewed and negative.        PAST MEDICAL HISTORY     Past

## 2023-08-21 LAB
EKG ATRIAL RATE: 72 BPM
EKG P AXIS: 58 DEGREES
EKG P-R INTERVAL: 154 MS
EKG Q-T INTERVAL: 412 MS
EKG QRS DURATION: 84 MS
EKG QTC CALCULATION (BAZETT): 451 MS
EKG R AXIS: 21 DEGREES
EKG T AXIS: 31 DEGREES
EKG VENTRICULAR RATE: 72 BPM

## 2023-08-21 PROCEDURE — 93010 ELECTROCARDIOGRAM REPORT: CPT | Performed by: INTERNAL MEDICINE

## 2023-09-06 ENCOUNTER — PREP FOR PROCEDURE (OUTPATIENT)
Dept: GASTROENTEROLOGY | Age: 58
End: 2023-09-06

## 2023-10-23 RX ORDER — OMEPRAZOLE 40 MG/1
40 CAPSULE, DELAYED RELEASE ORAL DAILY
Qty: 30 CAPSULE | Refills: 3 | Status: SHIPPED | OUTPATIENT
Start: 2023-10-23

## 2023-11-07 ENCOUNTER — PREP FOR PROCEDURE (OUTPATIENT)
Dept: GASTROENTEROLOGY | Age: 58
End: 2023-11-07

## 2023-11-09 PROBLEM — K21.9 GASTROESOPHAGEAL REFLUX DISEASE: Status: ACTIVE | Noted: 2023-09-06

## 2023-11-09 RX ORDER — SODIUM CHLORIDE 9 MG/ML
INJECTION, SOLUTION INTRAVENOUS PRN
Status: CANCELLED | OUTPATIENT
Start: 2023-11-09

## 2023-11-09 RX ORDER — SODIUM CHLORIDE 9 MG/ML
INJECTION, SOLUTION INTRAVENOUS CONTINUOUS
Status: CANCELLED | OUTPATIENT
Start: 2023-11-09

## 2023-11-09 RX ORDER — SODIUM CHLORIDE 0.9 % (FLUSH) 0.9 %
5-40 SYRINGE (ML) INJECTION EVERY 12 HOURS SCHEDULED
Status: CANCELLED | OUTPATIENT
Start: 2023-11-09

## 2023-11-10 ENCOUNTER — HOSPITAL ENCOUNTER (OUTPATIENT)
Age: 58
Setting detail: OUTPATIENT SURGERY
Discharge: HOME | End: 2023-11-10
Attending: INTERNAL MEDICINE | Admitting: INTERNAL MEDICINE
Payer: MEDICAID

## 2023-11-10 ENCOUNTER — ANESTHESIA (OUTPATIENT)
Dept: ENDOSCOPY | Age: 58
End: 2023-11-10
Payer: MEDICAID

## 2023-11-10 ENCOUNTER — ANESTHESIA EVENT (OUTPATIENT)
Dept: ENDOSCOPY | Age: 58
End: 2023-11-10
Payer: MEDICAID

## 2023-11-10 VITALS
TEMPERATURE: 97.1 F | OXYGEN SATURATION: 96 % | HEIGHT: 63 IN | BODY MASS INDEX: 35.44 KG/M2 | DIASTOLIC BLOOD PRESSURE: 76 MMHG | RESPIRATION RATE: 18 BRPM | SYSTOLIC BLOOD PRESSURE: 131 MMHG | HEART RATE: 84 BPM | WEIGHT: 200 LBS

## 2023-11-10 DIAGNOSIS — K21.9 GASTROESOPHAGEAL REFLUX DISEASE: ICD-10-CM

## 2023-11-10 LAB
GLUCOSE BLD-MCNC: 150 MG/DL (ref 70–99)
PERFORMED ON: ABNORMAL

## 2023-11-10 PROCEDURE — 6360000002 HC RX W HCPCS: Performed by: NURSE ANESTHETIST, CERTIFIED REGISTERED

## 2023-11-10 PROCEDURE — 7100000011 HC PHASE II RECOVERY - ADDTL 15 MIN: Performed by: INTERNAL MEDICINE

## 2023-11-10 PROCEDURE — 6370000000 HC RX 637 (ALT 250 FOR IP): Performed by: INTERNAL MEDICINE

## 2023-11-10 PROCEDURE — 88305 TISSUE EXAM BY PATHOLOGIST: CPT

## 2023-11-10 PROCEDURE — 43239 EGD BIOPSY SINGLE/MULTIPLE: CPT | Performed by: INTERNAL MEDICINE

## 2023-11-10 PROCEDURE — 3700000000 HC ANESTHESIA ATTENDED CARE: Performed by: INTERNAL MEDICINE

## 2023-11-10 PROCEDURE — 3700000001 HC ADD 15 MINUTES (ANESTHESIA): Performed by: INTERNAL MEDICINE

## 2023-11-10 PROCEDURE — 88342 IMHCHEM/IMCYTCHM 1ST ANTB: CPT

## 2023-11-10 PROCEDURE — 2580000003 HC RX 258: Performed by: INTERNAL MEDICINE

## 2023-11-10 PROCEDURE — 2709999900 HC NON-CHARGEABLE SUPPLY: Performed by: INTERNAL MEDICINE

## 2023-11-10 PROCEDURE — 7100000010 HC PHASE II RECOVERY - FIRST 15 MIN: Performed by: INTERNAL MEDICINE

## 2023-11-10 PROCEDURE — 3609017100 HC EGD: Performed by: INTERNAL MEDICINE

## 2023-11-10 RX ORDER — ONDANSETRON 2 MG/ML
4 INJECTION INTRAMUSCULAR; INTRAVENOUS
Status: DISCONTINUED | OUTPATIENT
Start: 2023-11-10 | End: 2023-11-10 | Stop reason: HOSPADM

## 2023-11-10 RX ORDER — SODIUM CHLORIDE 9 MG/ML
INJECTION, SOLUTION INTRAVENOUS PRN
Status: DISCONTINUED | OUTPATIENT
Start: 2023-11-10 | End: 2023-11-10 | Stop reason: HOSPADM

## 2023-11-10 RX ORDER — SODIUM CHLORIDE 0.9 % (FLUSH) 0.9 %
5-40 SYRINGE (ML) INJECTION PRN
Status: DISCONTINUED | OUTPATIENT
Start: 2023-11-10 | End: 2023-11-10 | Stop reason: HOSPADM

## 2023-11-10 RX ORDER — SODIUM CHLORIDE 9 MG/ML
INJECTION, SOLUTION INTRAVENOUS
Status: COMPLETED
Start: 2023-11-10 | End: 2023-11-10

## 2023-11-10 RX ORDER — SODIUM CHLORIDE 9 MG/ML
INJECTION, SOLUTION INTRAVENOUS CONTINUOUS
Status: DISCONTINUED | OUTPATIENT
Start: 2023-11-10 | End: 2023-11-10 | Stop reason: HOSPADM

## 2023-11-10 RX ORDER — PROPOFOL 10 MG/ML
INJECTION, EMULSION INTRAVENOUS PRN
Status: DISCONTINUED | OUTPATIENT
Start: 2023-11-10 | End: 2023-11-10 | Stop reason: SDUPTHER

## 2023-11-10 RX ORDER — MAGNESIUM HYDROXIDE 1200 MG/15ML
LIQUID ORAL PRN
Status: DISCONTINUED | OUTPATIENT
Start: 2023-11-10 | End: 2023-11-10 | Stop reason: ALTCHOICE

## 2023-11-10 RX ORDER — SIMETHICONE 20 MG/.3ML
EMULSION ORAL PRN
Status: DISCONTINUED | OUTPATIENT
Start: 2023-11-10 | End: 2023-11-10 | Stop reason: ALTCHOICE

## 2023-11-10 RX ORDER — SODIUM CHLORIDE 0.9 % (FLUSH) 0.9 %
5-40 SYRINGE (ML) INJECTION EVERY 12 HOURS SCHEDULED
Status: DISCONTINUED | OUTPATIENT
Start: 2023-11-10 | End: 2023-11-10 | Stop reason: HOSPADM

## 2023-11-10 RX ADMIN — SODIUM CHLORIDE: 9 INJECTION, SOLUTION INTRAVENOUS at 09:57

## 2023-11-10 RX ADMIN — PROPOFOL 200 MG: 10 INJECTION, EMULSION INTRAVENOUS at 10:11

## 2023-11-10 ASSESSMENT — PAIN SCALES - GENERAL: PAINLEVEL_OUTOF10: 0

## 2023-11-10 ASSESSMENT — PAIN - FUNCTIONAL ASSESSMENT: PAIN_FUNCTIONAL_ASSESSMENT: 0-10

## 2023-11-10 NOTE — PROGRESS NOTES
Discharge instructions reviewed with the patient with assist from [place] and [place] #[id number]. Patient verbalized understanding and had no questions.

## 2023-11-10 NOTE — ANESTHESIA PRE PROCEDURE
given: Not Answered      Vital Signs (Current): There were no vitals filed for this visit. BP Readings from Last 3 Encounters:   08/18/23 124/83   08/12/23 139/68   04/25/23 129/73       NPO Status:                                                                                 BMI:   Wt Readings from Last 3 Encounters:   08/18/23 90.7 kg (200 lb)   08/12/23 90.7 kg (200 lb)   04/25/23 90.7 kg (200 lb)     There is no height or weight on file to calculate BMI.    CBC:   Lab Results   Component Value Date/Time    WBC 7.9 08/18/2023 03:00 PM    RBC 4.42 08/18/2023 03:00 PM    HGB 12.8 08/18/2023 03:00 PM    HCT 38.3 08/18/2023 03:00 PM    MCV 86.6 08/18/2023 03:00 PM    RDW 14.7 08/18/2023 03:00 PM     08/18/2023 03:00 PM       CMP:   Lab Results   Component Value Date/Time     08/18/2023 03:00 PM    K 4.2 08/18/2023 03:00 PM    K 4.0 08/13/2020 02:00 AM     08/18/2023 03:00 PM    CO2 28 08/18/2023 03:00 PM    BUN 12 08/18/2023 03:00 PM    CREATININE 0.72 08/18/2023 03:00 PM    GFRAA >60.0 08/23/2021 08:45 AM    LABGLOM >60.0 08/18/2023 03:00 PM    GLUCOSE 151 08/18/2023 03:00 PM    PROT 7.6 08/18/2023 03:00 PM    CALCIUM 9.1 08/18/2023 03:00 PM    BILITOT 0.3 08/18/2023 03:00 PM    ALKPHOS 110 08/18/2023 03:00 PM    AST 38 08/18/2023 03:00 PM    ALT 29 08/18/2023 03:00 PM       POC Tests: No results for input(s): \"POCGLU\", \"POCNA\", \"POCK\", \"POCCL\", \"POCBUN\", \"POCHEMO\", \"POCHCT\" in the last 72 hours.     Coags:   Lab Results   Component Value Date/Time    PROTIME 12.4 08/18/2023 03:00 PM    INR 0.9 08/18/2023 03:00 PM    APTT 29.0 07/30/2020 08:00 PM       HCG (If Applicable):   Lab Results   Component Value Date    PREGTESTUR Negative 10/28/2016        ABGs: No results found for: \"PHART\", \"PO2ART\", \"DMY5LON\", \"JKY7RXC\", \"BEART\", \"H7EMXBVU\"     Type & Screen (If Applicable):  No results found for: \"LABABO\", \"LABRH\"    Drug/Infectious Status (If

## 2023-11-10 NOTE — ANESTHESIA POSTPROCEDURE EVALUATION
Department of Anesthesiology  Postprocedure Note    Patient: Mariann Groves  MRN: [id number]  YOB: 1965  Date of evaluation: 11/10/2023      Procedure Summary     Date: 11/10/23 Room / Location: [address] [place] [address]    Anesthesia Start: 1007 Anesthesia Stop: 3208    Procedure: EGD W/ BIOPSIES Diagnosis:       Gastroesophageal reflux disease      (Gastroesophageal reflux disease [K21.9])    Surgeons: Lucas Corey MD Responsible Provider: FRANCA Bermeo CRNA    Anesthesia Type: MAC ASA Status: 3          Anesthesia Type: No value filed.     Marcelle Phase I: Marcelle Score: 10    Marcelle Phase II:        Anesthesia Post Evaluation    Patient location during evaluation: bedside  Patient participation: complete - patient participated  Level of consciousness: awake and awake and alert  Airway patency: patent  Nausea & Vomiting: no nausea and no vomiting  Complications: no  Cardiovascular status: blood pressure returned to baseline and hemodynamically stable  Respiratory status: acceptable  Hydration status: euvolemic  Pain management: adequate

## 2023-11-27 ENCOUNTER — OFFICE VISIT (OUTPATIENT)
Dept: GASTROENTEROLOGY | Age: 58
End: 2023-11-27

## 2023-11-27 VITALS — HEART RATE: 63 BPM | OXYGEN SATURATION: 98 % | HEIGHT: 63 IN | WEIGHT: 183 LBS | BODY MASS INDEX: 32.43 KG/M2

## 2023-11-27 DIAGNOSIS — R14.0 ABDOMINAL BLOATING: ICD-10-CM

## 2023-11-27 DIAGNOSIS — R11.0 NAUSEA WITHOUT VOMITING: ICD-10-CM

## 2023-11-27 DIAGNOSIS — K21.9 GASTROESOPHAGEAL REFLUX DISEASE WITHOUT ESOPHAGITIS: ICD-10-CM

## 2023-11-27 DIAGNOSIS — R19.7 INTERMITTENT DIARRHEA: ICD-10-CM

## 2023-11-27 DIAGNOSIS — K75.81 NASH (NONALCOHOLIC STEATOHEPATITIS): Primary | ICD-10-CM

## 2023-11-27 DIAGNOSIS — E11.9 TYPE 2 DIABETES MELLITUS WITHOUT COMPLICATION, WITHOUT LONG-TERM CURRENT USE OF INSULIN (HCC): ICD-10-CM

## 2023-11-27 RX ORDER — SIMETHICONE 80 MG
80 TABLET,CHEWABLE ORAL 4 TIMES DAILY PRN
Qty: 180 TABLET | Refills: 3 | Status: SHIPPED | OUTPATIENT
Start: 2023-11-27

## 2023-11-27 ASSESSMENT — ENCOUNTER SYMPTOMS
NAUSEA: 1
ABDOMINAL DISTENTION: 1

## 2023-11-27 NOTE — PROGRESS NOTES
Gastroenterology Clinic Follow up Visit    Emily Weber  [id number]  Chief Complaint   Patient presents with    Follow-up     *Of note, [facility]  services utilized during today's visit    HPI: 63 y.o. female following up after EGD 11/10/2023. Interval change:  Patient s/p EGD 11/10/2023 with normal results. Patient has history of CARBAJAL and GERD. She reports GERD is well controlled on Omeprazole 40 mg. Breakthrough symptoms with certain foods, she tries to avoid, mostly fatty foods. Continues Trulicity as prescribed with a patient reported increase in dose. Change in diet and medication has shown good DM control as shown by last A1C of 7.5 in 9/2023. Continues to have nausea without vomiting which improves 1 to 2 days after injection. She endorses an intentionally lost weight since starting Trulicity of 20 lbs over 1 year. Patient reports increased gas, taking OTC gas relief medication 5 of 7 days. She reports her bowel movements include diarrhea with days of constipation and formed stools occurring daily. Her labs from 8/2023 shown continued  improvement of LFTs from 4/2023. Following a healthier diet, lower in fat and amount. Denies hematemesis, hematochezia nor melena. HPI from last GI clinic 4/17/2023 summarized below:  Patient presents to the GI clinic, states she is not feeling well. She continues to endorse GERD symptoms despite taking omeprazole 40 mg daily. States she has to utilize Pepcid on occasion to help relieve her symptoms, which does help. Reports her last EGD was in 2020. She endorses occasional nausea/vomiting, attributes this to her Trulicity injections, states the symptoms usually resolve in approximately 1 day after her injection. She additionally continues to report chronic right upper quadrant abdominal pain, hx of CARBAJAL. Upon review of medical record patient's weight is up 7 pounds since her last GI clinic visit October 2021.   She denies hematemesis,

## 2024-01-08 ENCOUNTER — HOSPITAL ENCOUNTER (EMERGENCY)
Age: 59
Discharge: HOME | End: 2024-01-08
Payer: MEDICAID

## 2024-01-08 ENCOUNTER — APPOINTMENT (OUTPATIENT)
Dept: GENERAL RADIOLOGY | Age: 59
End: 2024-01-08
Payer: MEDICAID

## 2024-01-08 VITALS
RESPIRATION RATE: 18 BRPM | BODY MASS INDEX: 32.43 KG/M2 | WEIGHT: 183 LBS | TEMPERATURE: 99.1 F | SYSTOLIC BLOOD PRESSURE: 140 MMHG | DIASTOLIC BLOOD PRESSURE: 84 MMHG | HEIGHT: 63 IN | OXYGEN SATURATION: 98 % | HEART RATE: 87 BPM

## 2024-01-08 DIAGNOSIS — J34.89 SINUS PAIN: ICD-10-CM

## 2024-01-08 DIAGNOSIS — U07.1 COVID-19: Primary | ICD-10-CM

## 2024-01-08 LAB
INFLUENZA A BY PCR: NEGATIVE
INFLUENZA B BY PCR: NEGATIVE
SARS-COV-2 RDRP RESP QL NAA+PROBE: DETECTED
STREP GRP A PCR: NEGATIVE

## 2024-01-08 PROCEDURE — 6360000002 HC RX W HCPCS: Performed by: PHYSICIAN ASSISTANT

## 2024-01-08 PROCEDURE — 87651 STREP A DNA AMP PROBE: CPT

## 2024-01-08 PROCEDURE — 87635 SARS-COV-2 COVID-19 AMP PRB: CPT

## 2024-01-08 PROCEDURE — 96372 THER/PROPH/DIAG INJ SC/IM: CPT

## 2024-01-08 PROCEDURE — 87502 INFLUENZA DNA AMP PROBE: CPT

## 2024-01-08 PROCEDURE — 99284 EMERGENCY DEPT VISIT MOD MDM: CPT

## 2024-01-08 PROCEDURE — 71046 X-RAY EXAM CHEST 2 VIEWS: CPT

## 2024-01-08 RX ORDER — KETOROLAC TROMETHAMINE 15 MG/ML
30 INJECTION, SOLUTION INTRAMUSCULAR; INTRAVENOUS ONCE
Status: COMPLETED | OUTPATIENT
Start: 2024-01-08 | End: 2024-01-08

## 2024-01-08 RX ORDER — MELOXICAM 7.5 MG/1
7.5 TABLET ORAL DAILY
Qty: 7 TABLET | Refills: 0 | Status: SHIPPED | OUTPATIENT
Start: 2024-01-08

## 2024-01-08 RX ADMIN — KETOROLAC TROMETHAMINE 30 MG: 15 INJECTION, SOLUTION INTRAMUSCULAR; INTRAVENOUS at 18:59

## 2024-01-08 ASSESSMENT — ENCOUNTER SYMPTOMS
VOICE CHANGE: 0
ANAL BLEEDING: 0
VOMITING: 0
NAUSEA: 0
APNEA: 0
SORE THROAT: 1
BLOOD IN STOOL: 0
ABDOMINAL DISTENTION: 0
COUGH: 1
EYE DISCHARGE: 0

## 2024-01-08 ASSESSMENT — PAIN SCALES - GENERAL
PAINLEVEL_OUTOF10: 5
PAINLEVEL_OUTOF10: 10

## 2024-01-08 ASSESSMENT — PAIN DESCRIPTION - DESCRIPTORS: DESCRIPTORS: ACHING

## 2024-01-08 ASSESSMENT — PAIN DESCRIPTION - FREQUENCY: FREQUENCY: CONTINUOUS

## 2024-01-08 ASSESSMENT — PAIN DESCRIPTION - PAIN TYPE: TYPE: ACUTE PAIN

## 2024-01-08 ASSESSMENT — PAIN - FUNCTIONAL ASSESSMENT: PAIN_FUNCTIONAL_ASSESSMENT: 0-10

## 2024-01-08 ASSESSMENT — PAIN DESCRIPTION - ONSET: ONSET: SUDDEN

## 2024-01-08 NOTE — ED PROVIDER NOTES
maxillary and frontal sinus tenderness to palpation     Right Ear: Tympanic membrane and external ear normal.      Left Ear: Tympanic membrane and external ear normal.      Nose: Nose normal.      Mouth/Throat:      Mouth: Mucous membranes are moist.   Eyes:      General:         Right eye: No discharge.         Left eye: No discharge.      Pupils: Pupils are equal, round, and reactive to light.   Cardiovascular:      Rate and Rhythm: Normal rate and regular rhythm.      Pulses: Normal pulses.      Heart sounds: Normal heart sounds.   Pulmonary:      Effort: Pulmonary effort is normal. No respiratory distress.      Breath sounds: Normal breath sounds. No stridor. No wheezing, rhonchi or rales.   Abdominal:      General: Bowel sounds are normal. There is no distension.      Palpations: Abdomen is soft.      Tenderness: There is no abdominal tenderness.   Musculoskeletal:         General: Normal range of motion.      Cervical back: Normal range of motion and neck supple.   Skin:     General: Skin is warm.      Findings: No erythema.   Neurological:      Mental Status: She is alert and oriented to person, place, and time.   Psychiatric:         Mood and Affect: Mood normal.         DIAGNOSTIC RESULTS     EKG: All EKG's are interpreted by the Emergency Department Physician who either signs or Co-signs this chart in the absence of a cardiologist.         RADIOLOGY:   Non-plain film images such as CT, Ultrasound and MRI are read by the radiologist. Plain radiographic images are visualized and preliminarily interpreted by the emergency physician with the below findings:         Interpretation per the Radiologist below, if available at the time of this note:    XR CHEST (2 VW)   Final Result   No acute process.               ED BEDSIDE ULTRASOUND:   Performed by ED Physician - none    LABS:  Labs Reviewed   COVID-19, RAPID - Abnormal; Notable for the following components:       Result Value    SARS-CoV-2, NAAT DETECTED (*)

## 2024-01-09 NOTE — DISCHARGE INSTRUCTIONS
Follow-up primary care physician.  Follow isolation guidelines.  Return to if any symptoms worsen or new symptoms develop

## 2024-03-06 ENCOUNTER — HOSPITAL ENCOUNTER (EMERGENCY)
Age: 59
Discharge: HOME | End: 2024-03-06
Payer: MEDICAID

## 2024-03-06 ENCOUNTER — APPOINTMENT (OUTPATIENT)
Dept: GENERAL RADIOLOGY | Age: 59
End: 2024-03-06
Payer: MEDICAID

## 2024-03-06 VITALS
RESPIRATION RATE: 18 BRPM | WEIGHT: 178 LBS | HEART RATE: 82 BPM | BODY MASS INDEX: 31.53 KG/M2 | SYSTOLIC BLOOD PRESSURE: 178 MMHG | TEMPERATURE: 98 F | DIASTOLIC BLOOD PRESSURE: 82 MMHG | OXYGEN SATURATION: 100 %

## 2024-03-06 DIAGNOSIS — M54.41 ACUTE RIGHT-SIDED LOW BACK PAIN WITH RIGHT-SIDED SCIATICA: Primary | ICD-10-CM

## 2024-03-06 PROCEDURE — 96372 THER/PROPH/DIAG INJ SC/IM: CPT

## 2024-03-06 PROCEDURE — 72110 X-RAY EXAM L-2 SPINE 4/>VWS: CPT

## 2024-03-06 PROCEDURE — 6360000002 HC RX W HCPCS

## 2024-03-06 PROCEDURE — 99284 EMERGENCY DEPT VISIT MOD MDM: CPT

## 2024-03-06 RX ORDER — CYCLOBENZAPRINE HCL 10 MG
10 TABLET ORAL 3 TIMES DAILY PRN
Qty: 9 TABLET | Refills: 0 | Status: SHIPPED | OUTPATIENT
Start: 2024-03-06

## 2024-03-06 RX ORDER — LIDOCAINE 50 MG/G
1 PATCH TOPICAL DAILY
Qty: 10 PATCH | Refills: 0 | Status: SHIPPED | OUTPATIENT
Start: 2024-03-06

## 2024-03-06 RX ORDER — IBUPROFEN 600 MG/1
600 TABLET ORAL 3 TIMES DAILY PRN
Qty: 30 TABLET | Refills: 0 | Status: SHIPPED | OUTPATIENT
Start: 2024-03-06

## 2024-03-06 RX ORDER — METHYLPREDNISOLONE 4 MG/1
TABLET ORAL
Qty: 1 KIT | Refills: 0 | Status: SHIPPED | OUTPATIENT
Start: 2024-03-06 | End: 2024-03-12

## 2024-03-06 RX ORDER — ORPHENADRINE CITRATE 30 MG/ML
60 INJECTION INTRAMUSCULAR; INTRAVENOUS ONCE
Status: COMPLETED | OUTPATIENT
Start: 2024-03-06 | End: 2024-03-06

## 2024-03-06 RX ORDER — KETOROLAC TROMETHAMINE 30 MG/ML
30 INJECTION, SOLUTION INTRAMUSCULAR; INTRAVENOUS ONCE
Status: COMPLETED | OUTPATIENT
Start: 2024-03-06 | End: 2024-03-06

## 2024-03-06 RX ADMIN — KETOROLAC TROMETHAMINE 30 MG: 30 INJECTION, SOLUTION INTRAMUSCULAR at 12:57

## 2024-03-06 RX ADMIN — ORPHENADRINE CITRATE 60 MG: 60 INJECTION INTRAMUSCULAR; INTRAVENOUS at 12:58

## 2024-03-06 ASSESSMENT — PAIN SCALES - GENERAL
PAINLEVEL_OUTOF10: 10

## 2024-03-06 ASSESSMENT — PAIN DESCRIPTION - LOCATION: LOCATION: BACK

## 2024-03-06 ASSESSMENT — PAIN - FUNCTIONAL ASSESSMENT: PAIN_FUNCTIONAL_ASSESSMENT: 0-10

## 2024-03-06 ASSESSMENT — ENCOUNTER SYMPTOMS: BACK PAIN: 1

## 2024-03-06 NOTE — DISCHARGE INSTRUCTIONS
Take medications as directed.    Follow-up with PCP.    Return to ED if any new, or worsening symptoms.

## 2024-03-06 NOTE — ED PROVIDER NOTES
interpreted by the Emergency Department Physician who either signs or Co-signs this chart in the absence of a cardiologist.    RADIOLOGY:   Non-plain film images such as CT, Ultrasound and MRI are read by the radiologist. Plain radiographic images are visualized and preliminarily interpreted by the emergency physician with the below findings:    Interpretation per the Radiologist below, if available at the time of this note:    XR LUMBAR SPINE (MIN 4 VIEWS)   Final Result   1. No acute fracture or subluxation.   2. Moderate multilevel osteo-degenerative changes again seen, when compared   to the previous study performed 06/07/2021.   3. Mild discogenic disc disease again seen.               ED BEDSIDE ULTRASOUND:   Performed by ED Physician - none    LABS:  Labs Reviewed - No data to display    All other labs were within normal range or not returned as of this dictation.    EMERGENCY DEPARTMENT COURSE and DIFFERENTIAL DIAGNOSIS/MDM:   Vitals:    Vitals:    03/06/24 1206   BP: (!) 178/82   Pulse: 82   Resp: 18   Temp: 98 °F (36.7 °C)   SpO2: 100%   Weight: 80.7 kg (178 lb)     Medical Decision Making  58-year-old female presents to ED for evaluation of back pain.  Patient is afebrile, hemodynamically stable, nontoxic.  Patient given IM Toradol, IM Norflex while in ED.  XR lumbar spine per radiologist interpretation demonstrates no acute fracture or subluxation.  Multilevel osteo degenerative changes again seen when compared to previous study performed on 06/07/2021.  Mild discogenic disc disease again seen.  Patient reevaluated following IM medications and patient does report significant relief in symptoms.  Patient is ambulatory while in ED with steady gait noted.  Patient will be discharged home with prescription for Medrol, ibuprofen, Flexeril, lidocaine patches.  Patient educated on supportive care.  Patient given standard anticipatory guidance, return to ED warning signs, strict follow-up guidelines with PCP.

## 2024-08-27 ENCOUNTER — APPOINTMENT (OUTPATIENT)
Dept: GENERAL RADIOLOGY | Age: 59
End: 2024-08-27
Payer: MEDICAID

## 2024-08-27 ENCOUNTER — APPOINTMENT (OUTPATIENT)
Dept: CT IMAGING | Age: 59
End: 2024-08-27
Payer: MEDICAID

## 2024-08-27 ENCOUNTER — HOSPITAL ENCOUNTER (EMERGENCY)
Age: 59
Discharge: HOME | End: 2024-08-28
Payer: MEDICAID

## 2024-08-27 DIAGNOSIS — R07.9 CHEST PAIN, UNSPECIFIED TYPE: ICD-10-CM

## 2024-08-27 DIAGNOSIS — R51.9 ACUTE NONINTRACTABLE HEADACHE, UNSPECIFIED HEADACHE TYPE: Primary | ICD-10-CM

## 2024-08-27 DIAGNOSIS — E83.42 HYPOMAGNESEMIA: ICD-10-CM

## 2024-08-27 LAB
ALBUMIN SERPL-MCNC: 4.1 G/DL (ref 3.5–4.6)
ALP SERPL-CCNC: 108 U/L (ref 40–130)
ALT SERPL-CCNC: 12 U/L (ref 0–33)
ANION GAP SERPL CALCULATED.3IONS-SCNC: 15 MEQ/L (ref 9–15)
AST SERPL-CCNC: 18 U/L (ref 0–35)
BASOPHILS # BLD: 0.1 K/UL (ref 0–0.2)
BASOPHILS NFR BLD: 0.6 %
BILIRUB SERPL-MCNC: 0.3 MG/DL (ref 0.2–0.7)
BUN SERPL-MCNC: 15 MG/DL (ref 6–20)
CALCIUM SERPL-MCNC: 9.3 MG/DL (ref 8.5–9.9)
CHLORIDE SERPL-SCNC: 101 MEQ/L (ref 95–107)
CO2 SERPL-SCNC: 25 MEQ/L (ref 20–31)
CREAT SERPL-MCNC: 0.86 MG/DL (ref 0.5–0.9)
EOSINOPHIL # BLD: 0.3 K/UL (ref 0–0.7)
EOSINOPHIL NFR BLD: 2.3 %
ERYTHROCYTE [DISTWIDTH] IN BLOOD BY AUTOMATED COUNT: 13.3 % (ref 11.5–14.5)
GLOBULIN SER CALC-MCNC: 3.2 G/DL (ref 2.3–3.5)
GLUCOSE SERPL-MCNC: 95 MG/DL (ref 70–99)
HCT VFR BLD AUTO: 38.9 % (ref 37–47)
HGB BLD-MCNC: 13 G/DL (ref 12–16)
LIPASE SERPL-CCNC: 32 U/L (ref 12–95)
LYMPHOCYTES # BLD: 3.3 K/UL (ref 1–4.8)
LYMPHOCYTES NFR BLD: 29.3 %
MAGNESIUM SERPL-MCNC: 1.3 MG/DL (ref 1.7–2.4)
MCH RBC QN AUTO: 29 PG (ref 27–31.3)
MCHC RBC AUTO-ENTMCNC: 33.4 % (ref 33–37)
MCV RBC AUTO: 86.8 FL (ref 79.4–94.8)
MONOCYTES # BLD: 0.7 K/UL (ref 0.2–0.8)
MONOCYTES NFR BLD: 6.5 %
NEUTROPHILS # BLD: 6.9 K/UL (ref 1.4–6.5)
NEUTS SEG NFR BLD: 61.1 %
PLATELET # BLD AUTO: 284 K/UL (ref 130–400)
POTASSIUM SERPL-SCNC: 3.7 MEQ/L (ref 3.4–4.9)
PROT SERPL-MCNC: 7.3 G/DL (ref 6.3–8)
RBC # BLD AUTO: 4.48 M/UL (ref 4.2–5.4)
SODIUM SERPL-SCNC: 141 MEQ/L (ref 135–144)
TROPONIN, HIGH SENSITIVITY: 12 NG/L (ref 0–19)
TSH SERPL-MCNC: 0.18 UIU/ML (ref 0.44–3.86)
WBC # BLD AUTO: 11.2 K/UL (ref 4.8–10.8)

## 2024-08-27 PROCEDURE — 99285 EMERGENCY DEPT VISIT HI MDM: CPT

## 2024-08-27 PROCEDURE — 83690 ASSAY OF LIPASE: CPT

## 2024-08-27 PROCEDURE — 36415 COLL VENOUS BLD VENIPUNCTURE: CPT

## 2024-08-27 PROCEDURE — 80053 COMPREHEN METABOLIC PANEL: CPT

## 2024-08-27 PROCEDURE — 84443 ASSAY THYROID STIM HORMONE: CPT

## 2024-08-27 PROCEDURE — 70450 CT HEAD/BRAIN W/O DYE: CPT

## 2024-08-27 PROCEDURE — 85025 COMPLETE CBC W/AUTO DIFF WBC: CPT

## 2024-08-27 PROCEDURE — 71046 X-RAY EXAM CHEST 2 VIEWS: CPT

## 2024-08-27 PROCEDURE — 84484 ASSAY OF TROPONIN QUANT: CPT

## 2024-08-27 PROCEDURE — 83735 ASSAY OF MAGNESIUM: CPT

## 2024-08-27 RX ORDER — METOCLOPRAMIDE HYDROCHLORIDE 5 MG/ML
10 INJECTION INTRAMUSCULAR; INTRAVENOUS ONCE
Status: COMPLETED | OUTPATIENT
Start: 2024-08-27 | End: 2024-08-28

## 2024-08-27 RX ORDER — MAGNESIUM SULFATE IN WATER 40 MG/ML
2000 INJECTION, SOLUTION INTRAVENOUS ONCE
Status: COMPLETED | OUTPATIENT
Start: 2024-08-27 | End: 2024-08-28

## 2024-08-27 RX ORDER — DEXAMETHASONE SODIUM PHOSPHATE 10 MG/ML
10 INJECTION INTRAMUSCULAR; INTRAVENOUS ONCE
Status: COMPLETED | OUTPATIENT
Start: 2024-08-27 | End: 2024-08-28

## 2024-08-27 RX ORDER — DIPHENHYDRAMINE HYDROCHLORIDE 50 MG/ML
50 INJECTION INTRAMUSCULAR; INTRAVENOUS ONCE
Status: COMPLETED | OUTPATIENT
Start: 2024-08-27 | End: 2024-08-28

## 2024-08-27 ASSESSMENT — PAIN - FUNCTIONAL ASSESSMENT: PAIN_FUNCTIONAL_ASSESSMENT: 0-10

## 2024-08-27 ASSESSMENT — PAIN SCALES - GENERAL: PAINLEVEL_OUTOF10: 10

## 2024-08-28 VITALS
RESPIRATION RATE: 16 BRPM | DIASTOLIC BLOOD PRESSURE: 77 MMHG | BODY MASS INDEX: 30.3 KG/M2 | TEMPERATURE: 98.3 F | OXYGEN SATURATION: 96 % | WEIGHT: 171 LBS | SYSTOLIC BLOOD PRESSURE: 144 MMHG | HEIGHT: 63 IN | HEART RATE: 86 BPM

## 2024-08-28 LAB
BILIRUB UR QL STRIP: NEGATIVE
CLARITY UR: CLEAR
COLOR UR: YELLOW
GLUCOSE UR STRIP-MCNC: NEGATIVE MG/DL
HGB UR QL STRIP: NEGATIVE
KETONES UR STRIP-MCNC: NEGATIVE MG/DL
LEUKOCYTE ESTERASE UR QL STRIP: NEGATIVE
NITRITE UR QL STRIP: NEGATIVE
PH UR STRIP: 5 [PH] (ref 5–9)
PROT UR STRIP-MCNC: NEGATIVE MG/DL
SP GR UR STRIP: 1.02 (ref 1–1.03)
TROPONIN, HIGH SENSITIVITY: 11 NG/L (ref 0–19)
URINE REFLEX TO CULTURE: NORMAL
UROBILINOGEN UR STRIP-ACNC: 0.2 E.U./DL

## 2024-08-28 PROCEDURE — 84484 ASSAY OF TROPONIN QUANT: CPT

## 2024-08-28 PROCEDURE — 36415 COLL VENOUS BLD VENIPUNCTURE: CPT

## 2024-08-28 PROCEDURE — 96375 TX/PRO/DX INJ NEW DRUG ADDON: CPT

## 2024-08-28 PROCEDURE — 93005 ELECTROCARDIOGRAM TRACING: CPT

## 2024-08-28 PROCEDURE — 81003 URINALYSIS AUTO W/O SCOPE: CPT

## 2024-08-28 PROCEDURE — 96365 THER/PROPH/DIAG IV INF INIT: CPT

## 2024-08-28 PROCEDURE — 6360000002 HC RX W HCPCS: Performed by: PHYSICIAN ASSISTANT

## 2024-08-28 RX ORDER — METOCLOPRAMIDE 10 MG/1
10 TABLET ORAL 4 TIMES DAILY
Qty: 20 TABLET | Refills: 0 | Status: SHIPPED | OUTPATIENT
Start: 2024-08-28

## 2024-08-28 RX ORDER — MAGNESIUM OXIDE 400 MG/1
400 TABLET ORAL 2 TIMES DAILY
Qty: 20 TABLET | Refills: 0 | Status: SHIPPED | OUTPATIENT
Start: 2024-08-28

## 2024-08-28 RX ADMIN — METOCLOPRAMIDE HYDROCHLORIDE 10 MG: 5 INJECTION INTRAMUSCULAR; INTRAVENOUS at 00:09

## 2024-08-28 RX ADMIN — MAGNESIUM SULFATE HEPTAHYDRATE 2000 MG: 40 INJECTION, SOLUTION INTRAVENOUS at 00:17

## 2024-08-28 RX ADMIN — DEXAMETHASONE SODIUM PHOSPHATE 10 MG: 10 INJECTION INTRAMUSCULAR; INTRAVENOUS at 00:09

## 2024-08-28 RX ADMIN — DIPHENHYDRAMINE HYDROCHLORIDE 50 MG: 50 INJECTION INTRAMUSCULAR; INTRAVENOUS at 00:08

## 2024-08-28 ASSESSMENT — ENCOUNTER SYMPTOMS
VOMITING: 1
ABDOMINAL DISTENTION: 0
PHOTOPHOBIA: 1
APNEA: 0
ANAL BLEEDING: 0
VOICE CHANGE: 0
EYE DISCHARGE: 0
NAUSEA: 1

## 2024-08-28 NOTE — DISCHARGE INSTRUCTIONS
Drink fluids follow-up with primary care return to if any symptoms worsen or new symptoms develop.

## 2024-08-28 NOTE — ED PROVIDER NOTES
[facility] ED  EMERGENCY DEPARTMENT ENCOUNTER      Pt Name: Lore Candelaria  MRN: [id number]  Birthdate 1965  Date of evaluation: 8/27/2024  Provider: Dedrick Espinoza PA-C  12:58 AM EDT    CHIEF COMPLAINT       Chief Complaint   Patient presents with    Headache     X 15 minutes prior to arrival started getting a headache, associated with chest pain. Pt admits to having family stuff going on and that it may be stress related. Pt denies taking any pain medications prior to arrival. Denies any cardiac history and states she was treated for a migraine a long time ago.          HISTORY OF PRESENT ILLNESS   (Location/Symptom, Timing/Onset, Context/Setting, Quality, Duration, Modifying Factors, Severity)  Note limiting factors.   Lore Candelaria is a 59 y.o. female who presents to the emergency department   patient has headache photophobia x 1 day.  Also has chest pain facial pain nausea vomiting.  She has a history of migraines this is similar to her migraines in the past denies any abdominal pain pain burning or frequent urination.  Symptoms mild to moderate severity nothing proves worsen symptom    HPI    Nursing Notes were reviewed.    REVIEW OF SYSTEMS    (2-9 systems for level 4, 10 or more for level 5)     Review of Systems   Constitutional:  Negative for activity change, appetite change, chills and fever.   HENT:  Negative for ear discharge, nosebleeds and voice change.    Eyes:  Positive for photophobia. Negative for discharge.   Respiratory:  Negative for apnea.    Cardiovascular:  Positive for chest pain.   Gastrointestinal:  Positive for nausea and vomiting. Negative for abdominal distention and anal bleeding.   Genitourinary:  Negative for hematuria.   Musculoskeletal:  Negative for joint swelling.   Skin:  Negative for pallor.   Neurological:  Positive for headaches. Negative for seizures and facial asymmetry.   Hematological:  Does not bruise/bleed easily.   Psychiatric/Behavioral:  Negative for

## 2024-08-29 LAB
EKG ATRIAL RATE: 89 BPM
EKG P AXIS: 68 DEGREES
EKG P-R INTERVAL: 154 MS
EKG Q-T INTERVAL: 388 MS
EKG QRS DURATION: 82 MS
EKG QTC CALCULATION (BAZETT): 472 MS
EKG R AXIS: 50 DEGREES
EKG T AXIS: 57 DEGREES
EKG VENTRICULAR RATE: 89 BPM

## 2024-08-29 PROCEDURE — 93010 ELECTROCARDIOGRAM REPORT: CPT | Performed by: INTERNAL MEDICINE

## 2025-06-20 ENCOUNTER — APPOINTMENT (OUTPATIENT)
Dept: CT IMAGING | Age: 60
End: 2025-06-20
Attending: EMERGENCY MEDICINE
Payer: MEDICAID

## 2025-06-20 ENCOUNTER — HOSPITAL ENCOUNTER (EMERGENCY)
Age: 60
Discharge: HOME | End: 2025-06-20
Attending: EMERGENCY MEDICINE
Payer: MEDICAID

## 2025-06-20 ENCOUNTER — APPOINTMENT (OUTPATIENT)
Dept: GENERAL RADIOLOGY | Age: 60
End: 2025-06-20
Payer: MEDICAID

## 2025-06-20 VITALS
HEART RATE: 75 BPM | TEMPERATURE: 98.6 F | BODY MASS INDEX: 35.2 KG/M2 | OXYGEN SATURATION: 100 % | WEIGHT: 198.7 LBS | RESPIRATION RATE: 12 BRPM | DIASTOLIC BLOOD PRESSURE: 84 MMHG | SYSTOLIC BLOOD PRESSURE: 123 MMHG

## 2025-06-20 DIAGNOSIS — R10.9 ABDOMINAL PAIN OF UNKNOWN ETIOLOGY: Primary | ICD-10-CM

## 2025-06-20 LAB
ALBUMIN SERPL-MCNC: 4.4 G/DL (ref 3.5–4.6)
ALP SERPL-CCNC: 101 U/L (ref 40–130)
ALT SERPL-CCNC: 15 U/L (ref 0–33)
ANION GAP SERPL CALCULATED.3IONS-SCNC: 10 MEQ/L (ref 9–15)
AST SERPL-CCNC: 20 U/L (ref 0–35)
BASOPHILS # BLD: 0 K/UL (ref 0–0.2)
BASOPHILS NFR BLD: 0.6 %
BILIRUB SERPL-MCNC: 0.3 MG/DL (ref 0.2–0.7)
BILIRUB UR QL STRIP: NEGATIVE
BUN SERPL-MCNC: 11 MG/DL (ref 6–20)
CALCIUM SERPL-MCNC: 9.5 MG/DL (ref 8.5–9.9)
CHLORIDE SERPL-SCNC: 101 MEQ/L (ref 95–107)
CLARITY UR: CLEAR
CO2 SERPL-SCNC: 29 MEQ/L (ref 20–31)
COLOR UR: YELLOW
CREAT SERPL-MCNC: 0.89 MG/DL (ref 0.5–0.9)
EOSINOPHIL # BLD: 0.2 K/UL (ref 0–0.7)
EOSINOPHIL NFR BLD: 3.5 %
ERYTHROCYTE [DISTWIDTH] IN BLOOD BY AUTOMATED COUNT: 13 % (ref 11.5–14.5)
GLOBULIN SER CALC-MCNC: 3 G/DL (ref 2.3–3.5)
GLUCOSE SERPL-MCNC: 92 MG/DL (ref 70–99)
GLUCOSE UR STRIP-MCNC: NEGATIVE MG/DL
HCT VFR BLD AUTO: 40.7 % (ref 37–47)
HGB BLD-MCNC: 13.2 G/DL (ref 12–16)
HGB UR QL STRIP: NEGATIVE
KETONES UR STRIP-MCNC: NEGATIVE MG/DL
LEUKOCYTE ESTERASE UR QL STRIP: NEGATIVE
LIPASE SERPL-CCNC: 37 U/L (ref 12–95)
LYMPHOCYTES # BLD: 2 K/UL (ref 1–4.8)
LYMPHOCYTES NFR BLD: 32.2 %
MCH RBC QN AUTO: 28.9 PG (ref 27–31.3)
MCHC RBC AUTO-ENTMCNC: 32.4 % (ref 33–37)
MCV RBC AUTO: 89.1 FL (ref 79.4–94.8)
MONOCYTES # BLD: 0.4 K/UL (ref 0.2–0.8)
MONOCYTES NFR BLD: 6.6 %
NEUTROPHILS # BLD: 3.6 K/UL (ref 1.4–6.5)
NEUTS SEG NFR BLD: 56.9 %
NITRITE UR QL STRIP: NEGATIVE
PERFORMED ON: NORMAL
PH UR STRIP: 5.5 [PH] (ref 5–9)
PLATELET # BLD AUTO: 295 K/UL (ref 130–400)
POC CREATININE: 1 MG/DL (ref 0.6–1.2)
POC SAMPLE TYPE: NORMAL
POTASSIUM SERPL-SCNC: 4.1 MEQ/L (ref 3.4–4.9)
PROT SERPL-MCNC: 7.4 G/DL (ref 6.3–8)
PROT UR STRIP-MCNC: NEGATIVE MG/DL
RBC # BLD AUTO: 4.57 M/UL (ref 4.2–5.4)
SODIUM SERPL-SCNC: 140 MEQ/L (ref 135–144)
SP GR UR STRIP: 1.02 (ref 1–1.03)
TROPONIN, HIGH SENSITIVITY: 7 NG/L (ref 0–19)
TROPONIN, HIGH SENSITIVITY: <6 NG/L (ref 0–19)
URINE REFLEX TO CULTURE: NORMAL
UROBILINOGEN UR STRIP-ACNC: 0.2 E.U./DL
WBC # BLD AUTO: 6.3 K/UL (ref 4.8–10.8)

## 2025-06-20 PROCEDURE — 2580000003 HC RX 258: Performed by: EMERGENCY MEDICINE

## 2025-06-20 PROCEDURE — 6360000002 HC RX W HCPCS: Performed by: EMERGENCY MEDICINE

## 2025-06-20 PROCEDURE — 96372 THER/PROPH/DIAG INJ SC/IM: CPT

## 2025-06-20 PROCEDURE — 85025 COMPLETE CBC W/AUTO DIFF WBC: CPT

## 2025-06-20 PROCEDURE — 96374 THER/PROPH/DIAG INJ IV PUSH: CPT

## 2025-06-20 PROCEDURE — 74177 CT ABD & PELVIS W/CONTRAST: CPT

## 2025-06-20 PROCEDURE — 2500000003 HC RX 250 WO HCPCS: Performed by: EMERGENCY MEDICINE

## 2025-06-20 PROCEDURE — 36415 COLL VENOUS BLD VENIPUNCTURE: CPT

## 2025-06-20 PROCEDURE — 99285 EMERGENCY DEPT VISIT HI MDM: CPT

## 2025-06-20 PROCEDURE — 83690 ASSAY OF LIPASE: CPT

## 2025-06-20 PROCEDURE — 81003 URINALYSIS AUTO W/O SCOPE: CPT

## 2025-06-20 PROCEDURE — 6360000004 HC RX CONTRAST MEDICATION: Performed by: EMERGENCY MEDICINE

## 2025-06-20 PROCEDURE — 96361 HYDRATE IV INFUSION ADD-ON: CPT

## 2025-06-20 PROCEDURE — 96375 TX/PRO/DX INJ NEW DRUG ADDON: CPT

## 2025-06-20 PROCEDURE — 71046 X-RAY EXAM CHEST 2 VIEWS: CPT

## 2025-06-20 PROCEDURE — 80053 COMPREHEN METABOLIC PANEL: CPT

## 2025-06-20 PROCEDURE — 84484 ASSAY OF TROPONIN QUANT: CPT

## 2025-06-20 RX ORDER — ONDANSETRON 2 MG/ML
4 INJECTION INTRAMUSCULAR; INTRAVENOUS ONCE
Status: COMPLETED | OUTPATIENT
Start: 2025-06-20 | End: 2025-06-20

## 2025-06-20 RX ORDER — DICYCLOMINE HCL 20 MG
20 TABLET ORAL EVERY 6 HOURS PRN
Qty: 20 TABLET | Refills: 0 | Status: SHIPPED | OUTPATIENT
Start: 2025-06-20

## 2025-06-20 RX ORDER — FAMOTIDINE 20 MG/1
20 TABLET, FILM COATED ORAL 2 TIMES DAILY
Qty: 60 TABLET | Refills: 0 | Status: SHIPPED | OUTPATIENT
Start: 2025-06-20

## 2025-06-20 RX ORDER — IOPAMIDOL 755 MG/ML
75 INJECTION, SOLUTION INTRAVASCULAR
Status: COMPLETED | OUTPATIENT
Start: 2025-06-20 | End: 2025-06-20

## 2025-06-20 RX ORDER — DICYCLOMINE HYDROCHLORIDE 10 MG/ML
20 INJECTION INTRAMUSCULAR ONCE
Status: COMPLETED | OUTPATIENT
Start: 2025-06-20 | End: 2025-06-20

## 2025-06-20 RX ORDER — 0.9 % SODIUM CHLORIDE 0.9 %
500 INTRAVENOUS SOLUTION INTRAVENOUS ONCE
Status: COMPLETED | OUTPATIENT
Start: 2025-06-20 | End: 2025-06-20

## 2025-06-20 RX ORDER — NICOTINE POLACRILEX 4 MG
15 LOZENGE BUCCAL ONCE
Status: DISCONTINUED | OUTPATIENT
Start: 2025-06-20 | End: 2025-06-20 | Stop reason: HOSPADM

## 2025-06-20 RX ADMIN — FAMOTIDINE 20 MG: 10 INJECTION, SOLUTION INTRAVENOUS at 12:20

## 2025-06-20 RX ADMIN — IOPAMIDOL 75 ML: 755 INJECTION, SOLUTION INTRAVENOUS at 13:14

## 2025-06-20 RX ADMIN — SODIUM CHLORIDE 500 ML: 0.9 INJECTION, SOLUTION INTRAVENOUS at 12:20

## 2025-06-20 RX ADMIN — ONDANSETRON 4 MG: 2 INJECTION, SOLUTION INTRAMUSCULAR; INTRAVENOUS at 12:20

## 2025-06-20 RX ADMIN — DICYCLOMINE HYDROCHLORIDE 20 MG: 20 INJECTION, SOLUTION INTRAMUSCULAR at 12:20

## 2025-06-20 ASSESSMENT — PAIN SCALES - GENERAL
PAINLEVEL_OUTOF10: 8
PAINLEVEL_OUTOF10: 4

## 2025-06-20 ASSESSMENT — PAIN DESCRIPTION - ONSET: ONSET: ON-GOING

## 2025-06-20 ASSESSMENT — ENCOUNTER SYMPTOMS
EYE REDNESS: 0
VOMITING: 1
NAUSEA: 1
COUGH: 0
SINUS PAIN: 0
SHORTNESS OF BREATH: 1
EYE PAIN: 0
ABDOMINAL PAIN: 1
BACK PAIN: 0

## 2025-06-20 ASSESSMENT — PAIN DESCRIPTION - ORIENTATION: ORIENTATION: UPPER

## 2025-06-20 ASSESSMENT — PAIN - FUNCTIONAL ASSESSMENT
PAIN_FUNCTIONAL_ASSESSMENT: ACTIVITIES ARE NOT PREVENTED
PAIN_FUNCTIONAL_ASSESSMENT: PREVENTS OR INTERFERES SOME ACTIVE ACTIVITIES AND ADLS
PAIN_FUNCTIONAL_ASSESSMENT: 0-10

## 2025-06-20 ASSESSMENT — PAIN DESCRIPTION - LOCATION
LOCATION: ABDOMEN
LOCATION: ABDOMEN

## 2025-06-20 ASSESSMENT — PAIN DESCRIPTION - FREQUENCY: FREQUENCY: CONTINUOUS

## 2025-06-20 ASSESSMENT — PAIN DESCRIPTION - DESCRIPTORS: DESCRIPTORS: ACHING

## 2025-06-20 ASSESSMENT — PAIN DESCRIPTION - PAIN TYPE
TYPE: ACUTE PAIN
TYPE: ACUTE PAIN

## 2025-06-20 NOTE — CARE COORDINATION
With the patient's permission, this nurse scheduled her an appointment to see Sherry Barkley NP for 6/25/25 @ 0840. This information was provided to the patient written in Nepali and she states that this is acceptable to her.

## 2025-06-20 NOTE — ED NOTES
Patients glucose monitor alerted her that her BGL was 79mg/dL. Patient provided food and drink to correct glucose.

## 2025-06-20 NOTE — ED TRIAGE NOTES
Pt states that she has mid upper abd pain that started yesterday  Pt denies any N/V   Pt states \"a little bit of diarrhea\"

## 2025-06-21 LAB
EKG ATRIAL RATE: 76 BPM
EKG DIAGNOSIS: NORMAL
EKG P AXIS: 60 DEGREES
EKG P-R INTERVAL: 158 MS
EKG Q-T INTERVAL: 378 MS
EKG QRS DURATION: 76 MS
EKG QTC CALCULATION (BAZETT): 425 MS
EKG R AXIS: 22 DEGREES
EKG T AXIS: 38 DEGREES
EKG VENTRICULAR RATE: 76 BPM

## 2025-06-23 LAB
EKG ATRIAL RATE: 76 BPM
EKG DIAGNOSIS: NORMAL
EKG P AXIS: 60 DEGREES
EKG P-R INTERVAL: 158 MS
EKG Q-T INTERVAL: 378 MS
EKG QRS DURATION: 76 MS
EKG QTC CALCULATION (BAZETT): 425 MS
EKG R AXIS: 22 DEGREES
EKG T AXIS: 38 DEGREES
EKG VENTRICULAR RATE: 76 BPM

## (undated) DEVICE — ENDO CARRY-ON PROCEDURE KIT: Brand: ENDO CARRY-ON PROCEDURE KIT

## (undated) DEVICE — Device: Brand: ENDO SMARTCAP

## (undated) DEVICE — ADAPTER FLSH PMP FLD MGMT GI IRRIG OFP 2 DISPOSABLE

## (undated) DEVICE — TUBE SET 96 MM 64 MM H2O PERISTALTIC STD AUX CHANNEL

## (undated) DEVICE — 4-PORT MANIFOLD: Brand: NEPTUNE 2

## (undated) DEVICE — BRUSH ENDO CLN L90.5IN SHTH DIA1.7MM BRIST DIA5-7MM 2-6MM

## (undated) DEVICE — FORCEPS BX L240CM JAW DIA2.8MM L CAP W/ NDL MIC MESH TOOTH

## (undated) DEVICE — CONMED SCOPE SAVER BITE BLOCK, 20X27 MM: Brand: SCOPE SAVER

## (undated) DEVICE — FORCEPS BX L240CM JAW DIA2.4MM ORNG L CAP W/ NDL DISP RAD

## (undated) DEVICE — TUBING, SUCTION, 1/4" X 10', STRAIGHT: Brand: MEDLINE

## (undated) DEVICE — SINGLE PORT MANIFOLD: Brand: NEPTUNE 2